# Patient Record
Sex: FEMALE | Race: WHITE | Employment: UNEMPLOYED | URBAN - NONMETROPOLITAN AREA
[De-identification: names, ages, dates, MRNs, and addresses within clinical notes are randomized per-mention and may not be internally consistent; named-entity substitution may affect disease eponyms.]

---

## 2017-01-06 ENCOUNTER — ROUTINE PRENATAL (OUTPATIENT)
Dept: OBGYN | Age: 22
End: 2017-01-06

## 2017-01-06 VITALS
BODY MASS INDEX: 33.12 KG/M2 | DIASTOLIC BLOOD PRESSURE: 69 MMHG | WEIGHT: 199 LBS | SYSTOLIC BLOOD PRESSURE: 122 MMHG | HEART RATE: 89 BPM

## 2017-01-06 DIAGNOSIS — Z34.03 SUPERVISION OF NORMAL FIRST PREGNANCY IN THIRD TRIMESTER: Primary | ICD-10-CM

## 2017-01-06 PROCEDURE — 99213 OFFICE O/P EST LOW 20 MIN: CPT | Performed by: NURSE PRACTITIONER

## 2017-01-10 LAB — GROUP B STREP ANTIGEN: NEGATIVE

## 2017-01-16 ENCOUNTER — ROUTINE PRENATAL (OUTPATIENT)
Dept: OBGYN | Age: 22
End: 2017-01-16

## 2017-01-16 VITALS
DIASTOLIC BLOOD PRESSURE: 71 MMHG | BODY MASS INDEX: 32.62 KG/M2 | WEIGHT: 196 LBS | HEART RATE: 84 BPM | SYSTOLIC BLOOD PRESSURE: 114 MMHG

## 2017-01-16 DIAGNOSIS — O32.0XX0: ICD-10-CM

## 2017-01-16 DIAGNOSIS — Z34.03 SUPERVISION OF NORMAL FIRST PREGNANCY IN THIRD TRIMESTER: Primary | ICD-10-CM

## 2017-01-16 DIAGNOSIS — Z36.89 ENCOUNTER FOR ULTRASOUND TO CHECK FETAL GROWTH: ICD-10-CM

## 2017-01-16 DIAGNOSIS — Z3A.37 37 WEEKS GESTATION OF PREGNANCY: ICD-10-CM

## 2017-01-16 PROCEDURE — 0502F SUBSEQUENT PRENATAL CARE: CPT

## 2017-01-23 ENCOUNTER — ROUTINE PRENATAL (OUTPATIENT)
Dept: OBGYN | Age: 22
End: 2017-01-23

## 2017-01-23 ENCOUNTER — APPOINTMENT (OUTPATIENT)
Dept: ULTRASOUND IMAGING | Age: 22
End: 2017-01-23

## 2017-01-23 ENCOUNTER — HOSPITAL ENCOUNTER (OUTPATIENT)
Dept: ULTRASOUND IMAGING | Age: 22
Discharge: HOME | End: 2017-01-23

## 2017-01-23 VITALS — WEIGHT: 198 LBS | BODY MASS INDEX: 32.95 KG/M2 | DIASTOLIC BLOOD PRESSURE: 64 MMHG | SYSTOLIC BLOOD PRESSURE: 104 MMHG

## 2017-01-23 DIAGNOSIS — Z3A.37 37 WEEKS GESTATION OF PREGNANCY: ICD-10-CM

## 2017-01-23 DIAGNOSIS — Z34.03 SUPERVISION OF NORMAL FIRST PREGNANCY IN THIRD TRIMESTER: ICD-10-CM

## 2017-01-23 DIAGNOSIS — O32.0XX0: ICD-10-CM

## 2017-01-23 PROCEDURE — 99213 OFFICE O/P EST LOW 20 MIN: CPT

## 2017-01-23 PROCEDURE — 76815 OB US LIMITED FETUS(S): CPT

## 2017-01-27 ENCOUNTER — TELEPHONE (OUTPATIENT)
Dept: OBGYN | Age: 22
End: 2017-01-27

## 2017-01-28 ENCOUNTER — ANESTHESIA EVENT (OUTPATIENT)
Dept: MOTHER INFANT UNIT | Age: 22
End: 2017-01-28

## 2017-01-28 ENCOUNTER — ANESTHESIA (OUTPATIENT)
Dept: MOTHER INFANT UNIT | Age: 22
End: 2017-01-28

## 2017-01-28 ENCOUNTER — HOSPITAL ENCOUNTER (INPATIENT)
Age: 22
LOS: 3 days | Discharge: HOME | End: 2017-01-31
Admitting: OBSTETRICS & GYNECOLOGY

## 2017-01-28 VITALS — OXYGEN SATURATION: 100 % | DIASTOLIC BLOOD PRESSURE: 44 MMHG | SYSTOLIC BLOOD PRESSURE: 104 MMHG

## 2017-01-28 LAB
ABO/RH: NORMAL
ANTIBODY IDENTIFICATION: NORMAL
ANTIBODY SCREEN: NORMAL
BASOPHILS ABSOLUTE: 0 K/UL (ref 0–0.2)
BASOPHILS RELATIVE PERCENT: 0.2 % (ref 0–1)
EOSINOPHILS ABSOLUTE: 0 K/UL (ref 0–0.6)
EOSINOPHILS RELATIVE PERCENT: 0.2 % (ref 0–5)
HCT VFR BLD CALC: 37.6 % (ref 37–47)
HEMOGLOBIN: 11.8 G/DL (ref 12–16)
LYMPHOCYTES ABSOLUTE: 2.2 K/UL (ref 1.1–4.5)
LYMPHOCYTES RELATIVE PERCENT: 17.5 % (ref 20–40)
MCH RBC QN AUTO: 27.3 PG (ref 27–31)
MCHC RBC AUTO-ENTMCNC: 31.4 G/DL (ref 33–37)
MCV RBC AUTO: 86.8 FL (ref 81–99)
MONOCYTES ABSOLUTE: 0.7 K/UL (ref 0–0.9)
MONOCYTES RELATIVE PERCENT: 5.6 % (ref 0–10)
NEUTROPHILS ABSOLUTE: 9.6 K/UL (ref 1.5–7.5)
NEUTROPHILS RELATIVE PERCENT: 75.6 % (ref 50–65)
PDW BLD-RTO: 14.4 % (ref 11.5–14.5)
PLATELET # BLD: 143 K/UL (ref 130–400)
PMV BLD AUTO: 11.3 FL (ref 7.4–10.4)
RBC # BLD: 4.33 M/UL (ref 4.2–5.4)
WBC # BLD: 12.7 K/UL (ref 4.8–10.8)

## 2017-01-28 PROCEDURE — 86870 RBC ANTIBODY IDENTIFICATION: CPT

## 2017-01-28 PROCEDURE — 6360000002 HC RX W HCPCS: Performed by: OBSTETRICS & GYNECOLOGY

## 2017-01-28 PROCEDURE — 36415 COLL VENOUS BLD VENIPUNCTURE: CPT

## 2017-01-28 PROCEDURE — 4A1HXCZ MONITORING OF PRODUCTS OF CONCEPTION, CARDIAC RATE, EXTERNAL APPROACH: ICD-10-PCS | Performed by: OBSTETRICS & GYNECOLOGY

## 2017-01-28 PROCEDURE — 2580000003 HC RX 258: Performed by: OBSTETRICS & GYNECOLOGY

## 2017-01-28 PROCEDURE — 86592 SYPHILIS TEST NON-TREP QUAL: CPT

## 2017-01-28 PROCEDURE — 2500000003 HC RX 250 WO HCPCS: Performed by: NURSE ANESTHETIST, CERTIFIED REGISTERED

## 2017-01-28 PROCEDURE — 86850 RBC ANTIBODY SCREEN: CPT

## 2017-01-28 PROCEDURE — S0028 INJECTION, FAMOTIDINE, 20 MG: HCPCS | Performed by: NURSE ANESTHETIST, CERTIFIED REGISTERED

## 2017-01-28 PROCEDURE — 6370000000 HC RX 637 (ALT 250 FOR IP): Performed by: OBSTETRICS & GYNECOLOGY

## 2017-01-28 PROCEDURE — 2720000000 HC MISC SURG SUPPLY STERILE $0-50: Performed by: OBSTETRICS & GYNECOLOGY

## 2017-01-28 PROCEDURE — 6360000002 HC RX W HCPCS: Performed by: NURSE ANESTHETIST, CERTIFIED REGISTERED

## 2017-01-28 PROCEDURE — 86901 BLOOD TYPING SEROLOGIC RH(D): CPT

## 2017-01-28 PROCEDURE — 3609079900 HC CESAREAN SECTION: Performed by: OBSTETRICS & GYNECOLOGY

## 2017-01-28 PROCEDURE — 3700000001 HC ADD 15 MINUTES (ANESTHESIA): Performed by: OBSTETRICS & GYNECOLOGY

## 2017-01-28 PROCEDURE — 86900 BLOOD TYPING SEROLOGIC ABO: CPT

## 2017-01-28 PROCEDURE — 59025 FETAL NON-STRESS TEST: CPT

## 2017-01-28 PROCEDURE — 85025 COMPLETE CBC W/AUTO DIFF WBC: CPT

## 2017-01-28 PROCEDURE — 1220000000 HC SEMI PRIVATE OB R&B

## 2017-01-28 PROCEDURE — 3700000000 HC ANESTHESIA ATTENDED CARE: Performed by: OBSTETRICS & GYNECOLOGY

## 2017-01-28 PROCEDURE — 2720000003 HC MISC SUTURE/STAPLES/RELOADS/ETC: Performed by: OBSTETRICS & GYNECOLOGY

## 2017-01-28 PROCEDURE — 64520 N BLOCK LUMBAR/THORACIC: CPT | Performed by: NURSE ANESTHETIST, CERTIFIED REGISTERED

## 2017-01-28 RX ORDER — SODIUM CHLORIDE 0.9 % (FLUSH) 0.9 %
10 SYRINGE (ML) INJECTION EVERY 12 HOURS SCHEDULED
Status: DISCONTINUED | OUTPATIENT
Start: 2017-01-28 | End: 2017-01-31 | Stop reason: HOSPADM

## 2017-01-28 RX ORDER — SODIUM CHLORIDE 0.9 % (FLUSH) 0.9 %
10 SYRINGE (ML) INJECTION PRN
Status: DISCONTINUED | OUTPATIENT
Start: 2017-01-28 | End: 2017-01-31 | Stop reason: HOSPADM

## 2017-01-28 RX ORDER — CARBOPROST TROMETHAMINE 250 UG/ML
250 INJECTION, SOLUTION INTRAMUSCULAR PRN
Status: DISCONTINUED | OUTPATIENT
Start: 2017-01-28 | End: 2017-01-28 | Stop reason: SDUPTHER

## 2017-01-28 RX ORDER — SODIUM CHLORIDE 0.9 % (FLUSH) 0.9 %
10 SYRINGE (ML) INJECTION EVERY 12 HOURS SCHEDULED
Status: DISCONTINUED | OUTPATIENT
Start: 2017-01-28 | End: 2017-01-28 | Stop reason: SDUPTHER

## 2017-01-28 RX ORDER — ONDANSETRON 2 MG/ML
4 INJECTION INTRAMUSCULAR; INTRAVENOUS EVERY 6 HOURS PRN
Status: DISCONTINUED | OUTPATIENT
Start: 2017-01-28 | End: 2017-01-28 | Stop reason: SDUPTHER

## 2017-01-28 RX ORDER — HYDROCODONE BITARTRATE AND ACETAMINOPHEN 5; 325 MG/1; MG/1
1 TABLET ORAL EVERY 4 HOURS PRN
Status: DISCONTINUED | OUTPATIENT
Start: 2017-01-28 | End: 2017-01-31 | Stop reason: HOSPADM

## 2017-01-28 RX ORDER — METHYLERGONOVINE MALEATE 0.2 MG/ML
200 INJECTION INTRAVENOUS PRN
Status: DISCONTINUED | OUTPATIENT
Start: 2017-01-28 | End: 2017-01-31 | Stop reason: HOSPADM

## 2017-01-28 RX ORDER — EPHEDRINE SULFATE 50 MG/ML
INJECTION, SOLUTION INTRAVENOUS PRN
Status: DISCONTINUED | OUTPATIENT
Start: 2017-01-28 | End: 2017-01-28 | Stop reason: SDUPTHER

## 2017-01-28 RX ORDER — BISACODYL 10 MG
10 SUPPOSITORY, RECTAL RECTAL DAILY PRN
Status: DISCONTINUED | OUTPATIENT
Start: 2017-01-28 | End: 2017-01-31 | Stop reason: HOSPADM

## 2017-01-28 RX ORDER — DIPHENHYDRAMINE HCL 25 MG
25 TABLET ORAL EVERY 4 HOURS PRN
Status: DISCONTINUED | OUTPATIENT
Start: 2017-01-28 | End: 2017-01-28 | Stop reason: SDUPTHER

## 2017-01-28 RX ORDER — BUTORPHANOL TARTRATE 1 MG/ML
1 INJECTION, SOLUTION INTRAMUSCULAR; INTRAVENOUS
Status: DISCONTINUED | OUTPATIENT
Start: 2017-01-28 | End: 2017-01-28 | Stop reason: SDUPTHER

## 2017-01-28 RX ORDER — ACETAMINOPHEN 325 MG/1
650 TABLET ORAL EVERY 4 HOURS PRN
Status: DISCONTINUED | OUTPATIENT
Start: 2017-01-28 | End: 2017-01-31 | Stop reason: HOSPADM

## 2017-01-28 RX ORDER — KETOROLAC TROMETHAMINE 30 MG/ML
30 INJECTION, SOLUTION INTRAMUSCULAR; INTRAVENOUS EVERY 6 HOURS
Status: DISPENSED | OUTPATIENT
Start: 2017-01-28 | End: 2017-01-30

## 2017-01-28 RX ORDER — ONDANSETRON 2 MG/ML
4 INJECTION INTRAMUSCULAR; INTRAVENOUS EVERY 6 HOURS PRN
Status: DISCONTINUED | OUTPATIENT
Start: 2017-01-28 | End: 2017-01-31 | Stop reason: HOSPADM

## 2017-01-28 RX ORDER — LANOLIN 100 %
OINTMENT (GRAM) TOPICAL
Status: DISCONTINUED | OUTPATIENT
Start: 2017-01-28 | End: 2017-01-31 | Stop reason: HOSPADM

## 2017-01-28 RX ORDER — KETOROLAC TROMETHAMINE 30 MG/ML
INJECTION, SOLUTION INTRAMUSCULAR; INTRAVENOUS PRN
Status: DISCONTINUED | OUTPATIENT
Start: 2017-01-28 | End: 2017-01-28 | Stop reason: SDUPTHER

## 2017-01-28 RX ORDER — DEXTROSE, SODIUM CHLORIDE, SODIUM LACTATE, POTASSIUM CHLORIDE, AND CALCIUM CHLORIDE 5; .6; .31; .03; .02 G/100ML; G/100ML; G/100ML; G/100ML; G/100ML
INJECTION, SOLUTION INTRAVENOUS CONTINUOUS
Status: DISCONTINUED | OUTPATIENT
Start: 2017-01-28 | End: 2017-01-31 | Stop reason: HOSPADM

## 2017-01-28 RX ORDER — OXYCODONE HYDROCHLORIDE AND ACETAMINOPHEN 5; 325 MG/1; MG/1
1 TABLET ORAL EVERY 4 HOURS PRN
Status: DISCONTINUED | OUTPATIENT
Start: 2017-01-28 | End: 2017-01-28 | Stop reason: SDUPTHER

## 2017-01-28 RX ORDER — DOCUSATE SODIUM 100 MG/1
100 CAPSULE, LIQUID FILLED ORAL 2 TIMES DAILY
Status: DISCONTINUED | OUTPATIENT
Start: 2017-01-28 | End: 2017-01-31 | Stop reason: HOSPADM

## 2017-01-28 RX ORDER — ACETAMINOPHEN 325 MG/1
650 TABLET ORAL EVERY 4 HOURS PRN
Status: DISCONTINUED | OUTPATIENT
Start: 2017-01-28 | End: 2017-01-28 | Stop reason: SDUPTHER

## 2017-01-28 RX ORDER — PRENATAL VIT/IRON FUM/FOLIC AC 27MG-0.8MG
1 TABLET ORAL DAILY
Status: DISCONTINUED | OUTPATIENT
Start: 2017-01-28 | End: 2017-01-31 | Stop reason: HOSPADM

## 2017-01-28 RX ORDER — CEFAZOLIN SODIUM 1 G/3ML
INJECTION, POWDER, FOR SOLUTION INTRAMUSCULAR; INTRAVENOUS PRN
Status: DISCONTINUED | OUTPATIENT
Start: 2017-01-28 | End: 2017-01-28 | Stop reason: SDUPTHER

## 2017-01-28 RX ORDER — HYDROCODONE BITARTRATE AND ACETAMINOPHEN 5; 325 MG/1; MG/1
2 TABLET ORAL EVERY 4 HOURS PRN
Status: DISCONTINUED | OUTPATIENT
Start: 2017-01-28 | End: 2017-01-31 | Stop reason: HOSPADM

## 2017-01-28 RX ORDER — BUPIVACAINE HYDROCHLORIDE 7.5 MG/ML
INJECTION, SOLUTION INTRASPINAL PRN
Status: DISCONTINUED | OUTPATIENT
Start: 2017-01-28 | End: 2017-01-28 | Stop reason: SDUPTHER

## 2017-01-28 RX ORDER — OXYCODONE HYDROCHLORIDE AND ACETAMINOPHEN 5; 325 MG/1; MG/1
2 TABLET ORAL EVERY 4 HOURS PRN
Status: DISCONTINUED | OUTPATIENT
Start: 2017-01-28 | End: 2017-01-28 | Stop reason: SDUPTHER

## 2017-01-28 RX ORDER — METHYLERGONOVINE MALEATE 0.2 MG/ML
200 INJECTION INTRAVENOUS PRN
Status: DISCONTINUED | OUTPATIENT
Start: 2017-01-28 | End: 2017-01-28 | Stop reason: SDUPTHER

## 2017-01-28 RX ORDER — SODIUM CHLORIDE 0.9 % (FLUSH) 0.9 %
10 SYRINGE (ML) INJECTION PRN
Status: DISCONTINUED | OUTPATIENT
Start: 2017-01-28 | End: 2017-01-28 | Stop reason: SDUPTHER

## 2017-01-28 RX ORDER — MORPHINE SULFATE 4 MG/ML
2 INJECTION, SOLUTION INTRAMUSCULAR; INTRAVENOUS
Status: DISCONTINUED | OUTPATIENT
Start: 2017-01-28 | End: 2017-01-31 | Stop reason: HOSPADM

## 2017-01-28 RX ORDER — FENTANYL CITRATE 50 UG/ML
INJECTION, SOLUTION INTRAMUSCULAR; INTRAVENOUS PRN
Status: DISCONTINUED | OUTPATIENT
Start: 2017-01-28 | End: 2017-01-28 | Stop reason: SDUPTHER

## 2017-01-28 RX ORDER — SODIUM CHLORIDE, SODIUM LACTATE, POTASSIUM CHLORIDE, CALCIUM CHLORIDE 600; 310; 30; 20 MG/100ML; MG/100ML; MG/100ML; MG/100ML
INJECTION, SOLUTION INTRAVENOUS CONTINUOUS
Status: DISCONTINUED | OUTPATIENT
Start: 2017-01-28 | End: 2017-01-28 | Stop reason: SDUPTHER

## 2017-01-28 RX ADMIN — BUPIVACAINE HYDROCHLORIDE IN DEXTROSE 1.8 ML: 7.5 INJECTION, SOLUTION SUBARACHNOID at 17:42

## 2017-01-28 RX ADMIN — Medication 500 ML: at 18:29

## 2017-01-28 RX ADMIN — KETOROLAC TROMETHAMINE 30 MG: 30 INJECTION, SOLUTION INTRAMUSCULAR at 18:10

## 2017-01-28 RX ADMIN — CEFAZOLIN 2000 MG: 1 INJECTION, POWDER, FOR SOLUTION INTRAMUSCULAR; INTRAVENOUS; PARENTERAL at 17:46

## 2017-01-28 RX ADMIN — EPHEDRINE SULFATE 10 MG: 50 INJECTION, SOLUTION INTRAMUSCULAR; INTRAVENOUS; SUBCUTANEOUS at 17:56

## 2017-01-28 RX ADMIN — ONDANSETRON HYDROCHLORIDE 4 MG: 2 INJECTION, SOLUTION INTRAVENOUS at 18:10

## 2017-01-28 RX ADMIN — DOCUSATE SODIUM 100 MG: 100 CAPSULE, LIQUID FILLED ORAL at 21:40

## 2017-01-28 RX ADMIN — FAMOTIDINE 20 MG: 10 INJECTION, SOLUTION INTRAVENOUS at 17:46

## 2017-01-28 RX ADMIN — EPHEDRINE SULFATE 10 MG: 50 INJECTION, SOLUTION INTRAMUSCULAR; INTRAVENOUS; SUBCUTANEOUS at 18:06

## 2017-01-28 RX ADMIN — EPHEDRINE SULFATE 10 MG: 50 INJECTION, SOLUTION INTRAMUSCULAR; INTRAVENOUS; SUBCUTANEOUS at 17:54

## 2017-01-28 RX ADMIN — Medication 500 ML: at 17:57

## 2017-01-28 RX ADMIN — SODIUM CHLORIDE, SODIUM LACTATE, POTASSIUM CHLORIDE, AND CALCIUM CHLORIDE: 600; 310; 30; 20 INJECTION, SOLUTION INTRAVENOUS at 17:36

## 2017-01-28 RX ADMIN — HYDROCODONE BITARTRATE AND ACETAMINOPHEN 1 TABLET: 5; 325 TABLET ORAL at 21:40

## 2017-01-28 RX ADMIN — FENTANYL CITRATE 50 MCG: 50 INJECTION INTRAMUSCULAR; INTRAVENOUS at 18:07

## 2017-01-28 RX ADMIN — EPHEDRINE SULFATE 10 MG: 50 INJECTION, SOLUTION INTRAMUSCULAR; INTRAVENOUS; SUBCUTANEOUS at 18:08

## 2017-01-28 RX ADMIN — FENTANYL CITRATE 50 MCG: 50 INJECTION INTRAMUSCULAR; INTRAVENOUS at 18:00

## 2017-01-28 ASSESSMENT — PAIN SCALES - GENERAL: PAINLEVEL_OUTOF10: 4

## 2017-01-29 LAB
HCT VFR BLD CALC: 31.3 % (ref 37–47)
HEMOGLOBIN: 9.9 G/DL (ref 12–16)
MCH RBC QN AUTO: 27.4 PG (ref 27–31)
MCHC RBC AUTO-ENTMCNC: 31.6 G/DL (ref 33–37)
MCV RBC AUTO: 86.7 FL (ref 81–99)
PDW BLD-RTO: 14.4 % (ref 11.5–14.5)
PLATELET # BLD: 147 K/UL (ref 130–400)
PMV BLD AUTO: 11.5 FL (ref 7.4–10.4)
RBC # BLD: 3.61 M/UL (ref 4.2–5.4)
WBC # BLD: 14.1 K/UL (ref 4.8–10.8)

## 2017-01-29 PROCEDURE — 36415 COLL VENOUS BLD VENIPUNCTURE: CPT

## 2017-01-29 PROCEDURE — 6360000002 HC RX W HCPCS: Performed by: OBSTETRICS & GYNECOLOGY

## 2017-01-29 PROCEDURE — 85027 COMPLETE CBC AUTOMATED: CPT

## 2017-01-29 PROCEDURE — 1220000000 HC SEMI PRIVATE OB R&B

## 2017-01-29 PROCEDURE — 85461 HEMOGLOBIN FETAL: CPT

## 2017-01-29 PROCEDURE — 86900 BLOOD TYPING SEROLOGIC ABO: CPT

## 2017-01-29 PROCEDURE — 86901 BLOOD TYPING SEROLOGIC RH(D): CPT

## 2017-01-29 PROCEDURE — 6370000000 HC RX 637 (ALT 250 FOR IP): Performed by: OBSTETRICS & GYNECOLOGY

## 2017-01-29 PROCEDURE — 2580000003 HC RX 258: Performed by: OBSTETRICS & GYNECOLOGY

## 2017-01-29 RX ORDER — IBUPROFEN 400 MG/1
800 TABLET ORAL EVERY 8 HOURS PRN
Status: DISCONTINUED | OUTPATIENT
Start: 2017-01-29 | End: 2017-01-31 | Stop reason: HOSPADM

## 2017-01-29 RX ADMIN — DOCUSATE SODIUM 100 MG: 100 CAPSULE, LIQUID FILLED ORAL at 10:16

## 2017-01-29 RX ADMIN — Medication 10 ML: at 02:13

## 2017-01-29 RX ADMIN — HYDROCODONE BITARTRATE AND ACETAMINOPHEN 2 TABLET: 5; 325 TABLET ORAL at 22:00

## 2017-01-29 RX ADMIN — KETOROLAC TROMETHAMINE 30 MG: 30 INJECTION, SOLUTION INTRAMUSCULAR at 02:13

## 2017-01-29 RX ADMIN — DOCUSATE SODIUM 100 MG: 100 CAPSULE, LIQUID FILLED ORAL at 22:00

## 2017-01-29 RX ADMIN — HYDROCODONE BITARTRATE AND ACETAMINOPHEN 1 TABLET: 5; 325 TABLET ORAL at 17:25

## 2017-01-29 RX ADMIN — PRENATAL VIT W/ FE FUMARATE-FA TAB 27-0.8 MG 1 TABLET: 27-0.8 TAB at 10:16

## 2017-01-29 RX ADMIN — Medication 10 ML: at 02:14

## 2017-01-29 RX ADMIN — HYDROCODONE BITARTRATE AND ACETAMINOPHEN 2 TABLET: 5; 325 TABLET ORAL at 11:00

## 2017-01-29 RX ADMIN — HYDROCODONE BITARTRATE AND ACETAMINOPHEN 1 TABLET: 5; 325 TABLET ORAL at 02:13

## 2017-01-29 ASSESSMENT — PAIN SCALES - GENERAL
PAINLEVEL_OUTOF10: 4
PAINLEVEL_OUTOF10: 3
PAINLEVEL_OUTOF10: 4
PAINLEVEL_OUTOF10: 6

## 2017-01-30 LAB
ABO/RH: NORMAL
FETAL SCREEN: NORMAL
RPR: NORMAL

## 2017-01-30 PROCEDURE — 6370000000 HC RX 637 (ALT 250 FOR IP): Performed by: OBSTETRICS & GYNECOLOGY

## 2017-01-30 PROCEDURE — 1220000000 HC SEMI PRIVATE OB R&B

## 2017-01-30 PROCEDURE — 6370000000 HC RX 637 (ALT 250 FOR IP): Performed by: NURSE PRACTITIONER

## 2017-01-30 RX ORDER — FERROUS SULFATE 325(65) MG
325 TABLET ORAL 2 TIMES DAILY WITH MEALS
Status: DISCONTINUED | OUTPATIENT
Start: 2017-01-30 | End: 2017-01-31 | Stop reason: HOSPADM

## 2017-01-30 RX ADMIN — IBUPROFEN 800 MG: 400 TABLET, FILM COATED ORAL at 08:47

## 2017-01-30 RX ADMIN — IBUPROFEN 800 MG: 400 TABLET, FILM COATED ORAL at 16:26

## 2017-01-30 RX ADMIN — DOCUSATE SODIUM 100 MG: 100 CAPSULE, LIQUID FILLED ORAL at 08:47

## 2017-01-30 RX ADMIN — PRENATAL VIT W/ FE FUMARATE-FA TAB 27-0.8 MG 1 TABLET: 27-0.8 TAB at 08:47

## 2017-01-30 RX ADMIN — FERROUS SULFATE TAB 325 MG (65 MG ELEMENTAL FE) 325 MG: 325 (65 FE) TAB at 16:26

## 2017-01-30 ASSESSMENT — PAIN SCALES - GENERAL
PAINLEVEL_OUTOF10: 2
PAINLEVEL_OUTOF10: 4

## 2017-01-31 VITALS
TEMPERATURE: 99 F | DIASTOLIC BLOOD PRESSURE: 63 MMHG | RESPIRATION RATE: 16 BRPM | HEART RATE: 81 BPM | SYSTOLIC BLOOD PRESSURE: 105 MMHG | OXYGEN SATURATION: 98 %

## 2017-01-31 PROCEDURE — 6370000000 HC RX 637 (ALT 250 FOR IP): Performed by: NURSE PRACTITIONER

## 2017-01-31 PROCEDURE — 6370000000 HC RX 637 (ALT 250 FOR IP): Performed by: OBSTETRICS & GYNECOLOGY

## 2017-01-31 RX ORDER — FERROUS SULFATE 325(65) MG
325 TABLET ORAL 2 TIMES DAILY WITH MEALS
Qty: 30 TABLET | Refills: 3 | Status: SHIPPED | OUTPATIENT
Start: 2017-01-31 | End: 2017-02-09 | Stop reason: ALTCHOICE

## 2017-01-31 RX ORDER — HYDROCODONE BITARTRATE AND ACETAMINOPHEN 5; 325 MG/1; MG/1
2 TABLET ORAL EVERY 4 HOURS PRN
Qty: 30 TABLET | Refills: 0 | Status: SHIPPED | OUTPATIENT
Start: 2017-01-31 | End: 2017-02-03

## 2017-01-31 RX ORDER — IBUPROFEN 800 MG/1
800 TABLET ORAL EVERY 8 HOURS PRN
Qty: 120 TABLET | Refills: 3 | COMMUNITY
Start: 2017-01-31 | End: 2017-08-03 | Stop reason: ALTCHOICE

## 2017-01-31 RX ORDER — PSEUDOEPHEDRINE HCL 30 MG
100 TABLET ORAL 2 TIMES DAILY
COMMUNITY
Start: 2017-01-31 | End: 2017-02-09 | Stop reason: ALTCHOICE

## 2017-01-31 RX ADMIN — PRENATAL VIT W/ FE FUMARATE-FA TAB 27-0.8 MG 1 TABLET: 27-0.8 TAB at 10:10

## 2017-01-31 RX ADMIN — IBUPROFEN 800 MG: 400 TABLET, FILM COATED ORAL at 10:11

## 2017-01-31 RX ADMIN — DOCUSATE SODIUM 100 MG: 100 CAPSULE, LIQUID FILLED ORAL at 10:11

## 2017-01-31 RX ADMIN — FERROUS SULFATE TAB 325 MG (65 MG ELEMENTAL FE) 325 MG: 325 (65 FE) TAB at 10:11

## 2017-01-31 ASSESSMENT — PAIN SCALES - GENERAL: PAINLEVEL_OUTOF10: 3

## 2017-02-09 ENCOUNTER — POSTPARTUM VISIT (OUTPATIENT)
Dept: OBGYN | Age: 22
End: 2017-02-09

## 2017-02-09 VITALS
WEIGHT: 173 LBS | TEMPERATURE: 99.5 F | HEIGHT: 66 IN | DIASTOLIC BLOOD PRESSURE: 71 MMHG | SYSTOLIC BLOOD PRESSURE: 103 MMHG | HEART RATE: 90 BPM | BODY MASS INDEX: 27.8 KG/M2

## 2017-02-09 DIAGNOSIS — Z51.89 VISIT FOR WOUND CHECK: ICD-10-CM

## 2017-02-09 PROCEDURE — 99213 OFFICE O/P EST LOW 20 MIN: CPT

## 2017-02-09 ASSESSMENT — ENCOUNTER SYMPTOMS
BLOOD IN STOOL: 0
COUGH: 0
COLOR CHANGE: 0
SHORTNESS OF BREATH: 0
DIARRHEA: 0
CONSTIPATION: 0
TROUBLE SWALLOWING: 0
BACK PAIN: 0

## 2017-03-09 ENCOUNTER — POSTPARTUM VISIT (OUTPATIENT)
Dept: OBGYN | Age: 22
End: 2017-03-09

## 2017-03-09 VITALS
SYSTOLIC BLOOD PRESSURE: 98 MMHG | HEART RATE: 86 BPM | DIASTOLIC BLOOD PRESSURE: 60 MMHG | TEMPERATURE: 98.7 F | HEIGHT: 66 IN | BODY MASS INDEX: 26.84 KG/M2 | WEIGHT: 167 LBS

## 2017-03-09 PROCEDURE — 99213 OFFICE O/P EST LOW 20 MIN: CPT

## 2017-03-09 RX ORDER — ACETAMINOPHEN AND CODEINE PHOSPHATE 120; 12 MG/5ML; MG/5ML
1 SOLUTION ORAL DAILY
Qty: 30 TABLET | Refills: 4 | Status: SHIPPED | OUTPATIENT
Start: 2017-03-09 | End: 2017-08-03 | Stop reason: SDUPTHER

## 2017-03-09 ASSESSMENT — ENCOUNTER SYMPTOMS
BLOOD IN STOOL: 0
COUGH: 0
TROUBLE SWALLOWING: 0
BACK PAIN: 0
COLOR CHANGE: 0
CONSTIPATION: 0
DIARRHEA: 0
SHORTNESS OF BREATH: 0

## 2017-08-03 ENCOUNTER — OFFICE VISIT (OUTPATIENT)
Dept: OBGYN | Age: 22
End: 2017-08-03

## 2017-08-03 VITALS
DIASTOLIC BLOOD PRESSURE: 72 MMHG | WEIGHT: 160 LBS | TEMPERATURE: 98.9 F | HEART RATE: 65 BPM | BODY MASS INDEX: 26.66 KG/M2 | HEIGHT: 65 IN | SYSTOLIC BLOOD PRESSURE: 107 MMHG

## 2017-08-03 DIAGNOSIS — Z01.419 ENCOUNTER FOR GYNECOLOGICAL EXAMINATION (GENERAL) (ROUTINE) WITHOUT ABNORMAL FINDINGS: Primary | ICD-10-CM

## 2017-08-03 PROCEDURE — 99213 OFFICE O/P EST LOW 20 MIN: CPT

## 2017-08-03 RX ORDER — ACETAMINOPHEN AND CODEINE PHOSPHATE 120; 12 MG/5ML; MG/5ML
1 SOLUTION ORAL DAILY
Qty: 30 TABLET | Refills: 11 | Status: SHIPPED | OUTPATIENT
Start: 2017-08-03 | End: 2018-01-10

## 2017-08-03 ASSESSMENT — ENCOUNTER SYMPTOMS
DIARRHEA: 0
TROUBLE SWALLOWING: 0
COUGH: 0
BACK PAIN: 0
BLOOD IN STOOL: 0
SHORTNESS OF BREATH: 0
CONSTIPATION: 0
COLOR CHANGE: 0

## 2018-01-10 ENCOUNTER — OFFICE VISIT (OUTPATIENT)
Dept: OBGYN | Age: 23
End: 2018-01-10

## 2018-01-10 VITALS
SYSTOLIC BLOOD PRESSURE: 116 MMHG | HEIGHT: 66 IN | DIASTOLIC BLOOD PRESSURE: 72 MMHG | BODY MASS INDEX: 25.71 KG/M2 | WEIGHT: 160 LBS

## 2018-01-10 DIAGNOSIS — N91.2 AMENORRHEA: Primary | ICD-10-CM

## 2018-01-10 DIAGNOSIS — O36.80X0 ENCOUNTER TO DETERMINE FETAL VIABILITY OF PREGNANCY, SINGLE OR UNSPECIFIED FETUS: ICD-10-CM

## 2018-01-10 LAB
CONTROL: ABNORMAL
PREGNANCY TEST URINE, POC: ABNORMAL

## 2018-01-10 PROCEDURE — 99213 OFFICE O/P EST LOW 20 MIN: CPT | Performed by: NURSE PRACTITIONER

## 2018-01-10 PROCEDURE — 81025 URINE PREGNANCY TEST: CPT | Performed by: NURSE PRACTITIONER

## 2018-01-10 ASSESSMENT — ENCOUNTER SYMPTOMS
GASTROINTESTINAL NEGATIVE: 1
RESPIRATORY NEGATIVE: 1
EYES NEGATIVE: 1

## 2018-01-10 NOTE — PROGRESS NOTES
equal, round, and reactive to light. Neck: Normal range of motion. Musculoskeletal: Normal range of motion. Neurological: She is alert and oriented to person, place, and time. Skin: Skin is warm and dry. Psychiatric: She has a normal mood and affect. Her behavior is normal.   Nursing note and vitals reviewed. 1. Amenorrhea  POCT urine pregnancy   2. Encounter to determine fetal viability of pregnancy, single or unspecified fetus  US OB Less Than 14 Weeks Single Fetus       MEDICATIONS:  No orders of the defined types were placed in this encounter. ORDERS:  Orders Placed This Encounter   Procedures    US OB Less Than 14 Weeks Single Fetus    POCT urine pregnancy       PLAN:  +UPT in office. Will schedule viability and NOB visit. Handouts given. To stop breastfeeding completely, we discussed can cause uterine contractions. Patient Instructions     Patient Education        Secondary Amenorrhea: Care Instructions  Your Care Instructions    Amenorrhea means you do not have menstrual periods. There are two types. Primary amenorrhea means you never start your periods. Secondary amenorrhea means you have had periods, and then they stop, especially for more than 3 months. Even if you don't have periods, you could still get pregnant. You may not know what caused your periods to stop. Possible causes include pregnancy, hormonal changes, and losing or gaining a lot of weight quickly. Some medicines and stress could also cause it. Being active in endurance sports can also cause you to miss your period or stop menstruating. Female athletes may try to lose or maintain weight in harmful ways. These include dieting too much or binging and purging. But doing these things can lead to eating disorders, amenorrhea, and osteoporosis. If you exercise less or gain a little weight, your periods will probably start again. Your doctor may order tests to find out why your periods have stopped.  Your doctor may give

## 2018-01-22 ENCOUNTER — INITIAL PRENATAL (OUTPATIENT)
Dept: OBGYN | Age: 23
End: 2018-01-22

## 2018-01-22 ENCOUNTER — HOSPITAL ENCOUNTER (OUTPATIENT)
Dept: ULTRASOUND IMAGING | Age: 23
Discharge: HOME | End: 2018-01-22

## 2018-01-22 VITALS
DIASTOLIC BLOOD PRESSURE: 73 MMHG | HEART RATE: 76 BPM | WEIGHT: 158 LBS | SYSTOLIC BLOOD PRESSURE: 113 MMHG | BODY MASS INDEX: 25.5 KG/M2

## 2018-01-22 DIAGNOSIS — Z34.81 ENCOUNTER FOR SUPERVISION OF OTHER NORMAL PREGNANCY IN FIRST TRIMESTER: Primary | ICD-10-CM

## 2018-01-22 DIAGNOSIS — Z3A.10 10 WEEKS GESTATION OF PREGNANCY: ICD-10-CM

## 2018-01-22 DIAGNOSIS — Z98.891 PREVIOUS CESAREAN SECTION: ICD-10-CM

## 2018-01-22 DIAGNOSIS — O36.80X0 ENCOUNTER TO DETERMINE FETAL VIABILITY OF PREGNANCY, SINGLE OR UNSPECIFIED FETUS: ICD-10-CM

## 2018-01-22 PROCEDURE — 76801 OB US < 14 WKS SINGLE FETUS: CPT

## 2018-01-22 PROCEDURE — 99213 OFFICE O/P EST LOW 20 MIN: CPT

## 2018-01-22 NOTE — PATIENT INSTRUCTIONS
folate. ¨ Dairy products, tofu, canned fish with bones, almonds, broccoli, dark leafy greens, corn tortillas, and fortified orange juice are good sources of calcium. ¨ Beef, poultry, liver, spinach, lentils, dried beans, fortified cereals, and dried fruits are rich in iron. ¨ Dark leafy greens, broccoli, asparagus, liver, fortified cereals, orange juice, peanuts, and almonds are good sources of folate. · Avoid foods that could harm your baby. ¨ Do not eat raw or undercooked meat, chicken, or fish (such as sushi or raw oysters). ¨ Do not eat raw eggs or foods that contain raw eggs, such as Caesar dressing. ¨ Do not eat soft cheeses and unpasteurized dairy foods, such as Brie, feta, or blue cheese. ¨ Do not eat fish that contains a lot of mercury, such as shark, swordfish, tilefish, or balbir mackerel. Do not eat more than 6 ounces of tuna each week. ¨ Do not eat raw sprouts, especially alfalfa sprouts. ¨ Cut down on caffeine, such as coffee, tea, and cola. Protect yourself and your baby  · Do not touch saqib litter or cat feces. They can cause an infection that could harm your baby. · High body temperature can be harmful to your baby. So if you want to use a sauna or hot tub, be sure to talk to your doctor about how to use it safely. [place] with morning sickness  · Sip small amounts of water, juices, or shakes. Try drinking between meals, not with meals. · Eat 5 or 6 small meals a day. Try dry toast or crackers when you first get up, and eat breakfast a little later. · Avoid spicy, greasy, and fatty foods. · When you feel sick, open your windows or go for a short walk to get fresh air. · Try nausea wristbands. These help some women. · Tell your doctor if you think your prenatal vitamins make you sick. Where can you learn more? Go to [email or web address]://audrey.healthSweetPerk. org and sign in to your [employer] account.  Enter G112 in the SnapMD box to learn more about \"Weeks 6 to 10 of Your Pregnancy: Care Instructions. \"     If you do not have an account, please click on the \"Sign Up Now\" link. Current as of: March 16, 2017  Content Version: 11.5  © 8043-0245 TowerJazz. Care instructions adapted under license by [facility] [address] If you have questions about a medical condition or this instruction, always ask your healthcare professional. [address] any warranty or liability for your use of this information. Patient Education        Weeks 10 to 14 of Your Pregnancy: Care Instructions  Your Care Instructions    By weeks 10 to 14 of your pregnancy, the placenta has formed inside your uterus. It is possible to hear your baby's heartbeat with a special ultrasound device. Your baby's eyes can and do move. The arms and legs can bend. This is a good time to think about testing for birth defects. There are two types of tests: screening and diagnostic. Screening tests show the chance that a baby has a certain birth defect. They can't tell you for sure that your baby has a problem. Diagnostic tests show if a baby has a certain birth defect. It's your choice whether to have these tests. You and your partner can talk to your doctor or midwife about birth defects tests. Follow-up care is a key part of your treatment and safety. Be sure to make and go to all appointments, and call your doctor if you are having problems. It's also a good idea to know your test results and keep a list of the medicines you take. How can you care for yourself at home? Decide about tests  · You can have screening tests and diagnostic tests to check for birth defects. The decision to have a test for birth defects is personal. Think about your age, your chance of passing on a family disease, your need to know about any problems, and what you might do after you have the test results. ¨ Triple or quadruple (quad) blood tests. These screening tests can be done between 15 and 20 weeks of pregnancy. They check the amounts of three or four substances in your blood. The doctor looks at these test results, along with your age and other factors, to find out the chance that your baby may have certain problems. ¨ Amniocentesis. This diagnostic test is used to look for chromosomal problems in the baby's cells. It can be done between 15 and 20 weeks of pregnancy, usually around week 16.  ¨ Nuchal translucency test. This test uses ultrasound to measure the thickness of the area at the back of the baby's neck. An increase in the thickness can be an early sign of Down syndrome. ¨ Chorionic villus sampling (CVS). This is a test that looks for certain genetic problems with your baby. The same genes that are in your baby are in the placenta. A small piece of the placenta is taken out and tested. This test is done when you are 10 to 13 weeks pregnant. Ease discomfort  · Slow down and take naps when you feel tired. · If your emotions swing, talk to someone. Crying, anxiety, and concentration problems are common. · If your gums bleed, try a softer toothbrush. If your gums are puffy and bleed a lot, see your dentist.  · If you feel dizzy:  ¨ Get up slowly after sitting or lying down. ¨ Drink plenty of fluids. ¨ Eat small snacks to keep your blood sugar stable. ¨ Put your head between your legs as though you were tying your shoelaces. ¨ Lie down with your legs higher than your head. Use pillows to prop up your feet. · If you have a headache:  ¨ Lie down. ¨ Ask your partner or a good friend for a neck massage. ¨ Try cool cloths over your forehead or across the back of your neck. ¨ Use acetaminophen (Tylenol) for pain relief. Do not use nonsteroidal anti-inflammatory drugs (NSAIDs), such as ibuprofen (Advil, Motrin) or naproxen (Aleve), unless your doctor says it is okay. · If you have a nosebleed, pinch your nose gently, and hold it for a short while.  To prevent nosebleeds, try massaging a small dab of petroleum

## 2018-01-22 NOTE — PROGRESS NOTES
Pt presents today for initial prenatal visit. Pt denies any vaginal leaking bleeding or contractions.

## 2018-01-24 LAB
ABO/RH: NORMAL
ANTIBODY SCREEN: NEGATIVE
CHLAMYDIA TRACHOMATIS AMPLIFIED DET: NEGATIVE
HCT: 38.8 %
HEPATITIS B SURFACE ANTIGEN INTERPRETATION: NEGATIVE
HGB: 12.6
HIV-1 AND HIV-2 ANTIBODIES: NEGATIVE
N GONORRHOEAE AMPLIFIED DET: NEGATIVE
RPR: NORMAL
RUBELLA ANTIBODY IGG: NORMAL

## 2018-03-06 ENCOUNTER — ROUTINE PRENATAL (OUTPATIENT)
Dept: OBGYN | Age: 23
End: 2018-03-06

## 2018-03-06 VITALS
DIASTOLIC BLOOD PRESSURE: 69 MMHG | SYSTOLIC BLOOD PRESSURE: 122 MMHG | WEIGHT: 162 LBS | BODY MASS INDEX: 26.15 KG/M2 | HEART RATE: 84 BPM

## 2018-03-06 DIAGNOSIS — Z34.82 ENCOUNTER FOR SUPERVISION OF OTHER NORMAL PREGNANCY IN SECOND TRIMESTER: Primary | ICD-10-CM

## 2018-03-06 DIAGNOSIS — Z3A.16 16 WEEKS GESTATION OF PREGNANCY: ICD-10-CM

## 2018-03-06 DIAGNOSIS — Z36.9 ANTENATAL SCREENING ENCOUNTER: ICD-10-CM

## 2018-03-06 DIAGNOSIS — Z98.891 PREVIOUS CESAREAN SECTION: ICD-10-CM

## 2018-03-06 PROCEDURE — 99213 OFFICE O/P EST LOW 20 MIN: CPT | Performed by: NURSE PRACTITIONER

## 2018-03-06 NOTE — PATIENT INSTRUCTIONS
Exercise  ? · If you did not exercise much before pregnancy, start slowly. Walking is best. Ajay Arango yourself, and do a little more every day. ? · Brisk walking, easy jogging, low-impact aerobics, water aerobics, and yoga are good choices. Some sports, such as scuba diving, horseback riding, downhill skiing, gymnastics, and water skiing, are not a good idea. ? · Try to do at least 2½ hours a week of moderate exercise, such as a fast walk. One way to do this is to be active 30 minutes a day, at least 5 days a week. It's fine to be active in blocks of 10 minutes or more throughout your day and week. ? · Wear loose clothing. And wear shoes and a bra that provide good support. ? · Warm up and cool down to start and finish your exercise. ? · If you want to use weights, be sure to use light weights. They reduce stress on your joints. ?Stay at the best weight for you  ? · Experts recommend that you gain about 1 pound a month during the first 3 months of your pregnancy. ? · Experts recommend that you gain about 1 pound a week during your last 6 months of pregnancy, for a total weight gain of 25 to 35 pounds. ? · If you are underweight, you will need to gain more weight (about 28 to 40 pounds). ? · If you are overweight, you may not need to gain as much weight (about 15 to 25 pounds). ? · If you are gaining weight too fast, use common sense. Exercise every day, and limit sweets, fast foods, and fats. Choose lean meats, fruits, and vegetables. ? · If you are having twins or more, your doctor may refer you to a dietitian. Where can you learn more? Go to https://[email or web address].[email or web address] org and sign in to your [employer] account. Enter P453 in the NextEnergy box to learn more about \"Weeks 14 to 18 of Your Pregnancy: Care Instructions. \"     If you do not have an account, please click on the \"Sign Up Now\" link.   Current as of: March 16, 2017  Content Version: 11.5  © 2515-5269 Healthwise, Incorporated. Care instructions adapted under license by [facility] ([facility]). If you have questions about a medical condition or this instruction, always ask your healthcare professional. [address] any warranty or liability for your use of this information.

## 2018-04-16 ENCOUNTER — HOSPITAL ENCOUNTER (OUTPATIENT)
Dept: ULTRASOUND IMAGING | Age: 23
Discharge: HOME | End: 2018-04-16

## 2018-04-16 DIAGNOSIS — Z36.9 ANTENATAL SCREENING ENCOUNTER: ICD-10-CM

## 2018-04-16 PROCEDURE — 76805 OB US >/= 14 WKS SNGL FETUS: CPT

## 2018-04-23 ENCOUNTER — ROUTINE PRENATAL (OUTPATIENT)
Dept: OBGYN | Age: 23
End: 2018-04-23

## 2018-04-23 VITALS — SYSTOLIC BLOOD PRESSURE: 118 MMHG | WEIGHT: 171 LBS | DIASTOLIC BLOOD PRESSURE: 78 MMHG | BODY MASS INDEX: 27.6 KG/M2

## 2018-04-23 DIAGNOSIS — R23.8 VENOUS LAKE: ICD-10-CM

## 2018-04-23 DIAGNOSIS — O26.892 RH NEGATIVE STATE IN ANTEPARTUM PERIOD, SECOND TRIMESTER: ICD-10-CM

## 2018-04-23 DIAGNOSIS — Z34.82 ENCOUNTER FOR SUPERVISION OF OTHER NORMAL PREGNANCY IN SECOND TRIMESTER: Primary | ICD-10-CM

## 2018-04-23 DIAGNOSIS — Z98.891 PREVIOUS CESAREAN SECTION: ICD-10-CM

## 2018-04-23 DIAGNOSIS — Z3A.23 23 WEEKS GESTATION OF PREGNANCY: ICD-10-CM

## 2018-04-23 DIAGNOSIS — Z67.91 RH NEGATIVE STATE IN ANTEPARTUM PERIOD, SECOND TRIMESTER: ICD-10-CM

## 2018-04-23 PROCEDURE — 99213 OFFICE O/P EST LOW 20 MIN: CPT

## 2018-05-21 ENCOUNTER — ROUTINE PRENATAL (OUTPATIENT)
Dept: OBGYN | Age: 23
End: 2018-05-21

## 2018-05-21 VITALS
HEART RATE: 76 BPM | BODY MASS INDEX: 29.38 KG/M2 | SYSTOLIC BLOOD PRESSURE: 115 MMHG | DIASTOLIC BLOOD PRESSURE: 71 MMHG | WEIGHT: 182 LBS

## 2018-05-21 DIAGNOSIS — Z34.82 ENCOUNTER FOR SUPERVISION OF OTHER NORMAL PREGNANCY IN SECOND TRIMESTER: ICD-10-CM

## 2018-05-21 DIAGNOSIS — Z67.91 RH NEGATIVE STATE IN ANTEPARTUM PERIOD, SECOND TRIMESTER: ICD-10-CM

## 2018-05-21 DIAGNOSIS — O26.892 RH NEGATIVE STATE IN ANTEPARTUM PERIOD, SECOND TRIMESTER: ICD-10-CM

## 2018-05-21 LAB
ABO/RH: NORMAL
ANTIBODY SCREEN: NORMAL
RHIG LOT NUMBER: NORMAL

## 2018-05-21 PROCEDURE — 99211 OFF/OP EST MAY X REQ PHY/QHP: CPT

## 2018-05-21 PROCEDURE — 96372 THER/PROPH/DIAG INJ SC/IM: CPT

## 2018-06-05 ENCOUNTER — ROUTINE PRENATAL (OUTPATIENT)
Dept: OBGYN | Age: 23
End: 2018-06-05

## 2018-06-05 VITALS
DIASTOLIC BLOOD PRESSURE: 60 MMHG | HEART RATE: 76 BPM | BODY MASS INDEX: 28.73 KG/M2 | SYSTOLIC BLOOD PRESSURE: 94 MMHG | WEIGHT: 178 LBS

## 2018-06-05 DIAGNOSIS — Z3A.29 29 WEEKS GESTATION OF PREGNANCY: Primary | ICD-10-CM

## 2018-06-05 PROCEDURE — 99212 OFFICE O/P EST SF 10 MIN: CPT | Performed by: OBSTETRICS & GYNECOLOGY

## 2018-06-19 ENCOUNTER — ROUTINE PRENATAL (OUTPATIENT)
Dept: OBGYN | Age: 23
End: 2018-06-19

## 2018-06-19 VITALS
BODY MASS INDEX: 29.86 KG/M2 | SYSTOLIC BLOOD PRESSURE: 114 MMHG | WEIGHT: 185 LBS | DIASTOLIC BLOOD PRESSURE: 76 MMHG | HEART RATE: 89 BPM

## 2018-06-19 DIAGNOSIS — Z34.83 ENCOUNTER FOR SUPERVISION OF OTHER NORMAL PREGNANCY IN THIRD TRIMESTER: Primary | ICD-10-CM

## 2018-06-19 PROCEDURE — 99213 OFFICE O/P EST LOW 20 MIN: CPT | Performed by: OBSTETRICS & GYNECOLOGY

## 2018-07-06 ENCOUNTER — ROUTINE PRENATAL (OUTPATIENT)
Dept: OBGYN | Age: 23
End: 2018-07-06

## 2018-07-06 VITALS
SYSTOLIC BLOOD PRESSURE: 116 MMHG | HEART RATE: 79 BPM | DIASTOLIC BLOOD PRESSURE: 70 MMHG | BODY MASS INDEX: 30.83 KG/M2 | WEIGHT: 191 LBS

## 2018-07-06 DIAGNOSIS — Z34.83 ENCOUNTER FOR SUPERVISION OF OTHER NORMAL PREGNANCY IN THIRD TRIMESTER: Primary | ICD-10-CM

## 2018-07-06 DIAGNOSIS — Z3A.34 34 WEEKS GESTATION OF PREGNANCY: ICD-10-CM

## 2018-07-06 PROCEDURE — 99213 OFFICE O/P EST LOW 20 MIN: CPT | Performed by: NURSE PRACTITIONER

## 2018-07-06 NOTE — PROGRESS NOTES
Pt presents today for routine prenatal exam. She denies any vaginal bleeding, leaking, or cramping. Positive fetal movement.

## 2018-07-16 ENCOUNTER — ROUTINE PRENATAL (OUTPATIENT)
Dept: OBGYN | Age: 23
End: 2018-07-16

## 2018-07-16 VITALS
SYSTOLIC BLOOD PRESSURE: 114 MMHG | BODY MASS INDEX: 31.31 KG/M2 | HEART RATE: 72 BPM | DIASTOLIC BLOOD PRESSURE: 68 MMHG | WEIGHT: 194 LBS

## 2018-07-16 DIAGNOSIS — Z34.03 ENCOUNTER FOR SUPERVISION OF NORMAL FIRST PREGNANCY IN THIRD TRIMESTER: ICD-10-CM

## 2018-07-16 DIAGNOSIS — Z3A.35 35 WEEKS GESTATION OF PREGNANCY: Primary | ICD-10-CM

## 2018-07-16 PROCEDURE — 99213 OFFICE O/P EST LOW 20 MIN: CPT | Performed by: OBSTETRICS & GYNECOLOGY

## 2018-07-21 LAB — GROUP B STREP CULTURE: NORMAL

## 2018-07-23 ENCOUNTER — ROUTINE PRENATAL (OUTPATIENT)
Dept: OBGYN | Age: 23
End: 2018-07-23

## 2018-07-23 VITALS
DIASTOLIC BLOOD PRESSURE: 72 MMHG | BODY MASS INDEX: 30.99 KG/M2 | HEART RATE: 95 BPM | WEIGHT: 192 LBS | SYSTOLIC BLOOD PRESSURE: 118 MMHG

## 2018-07-23 DIAGNOSIS — Z3A.36 36 WEEKS GESTATION OF PREGNANCY: ICD-10-CM

## 2018-07-23 DIAGNOSIS — Z34.03 ENCOUNTER FOR SUPERVISION OF NORMAL FIRST PREGNANCY IN THIRD TRIMESTER: Primary | ICD-10-CM

## 2018-07-23 PROCEDURE — 0502F SUBSEQUENT PRENATAL CARE: CPT | Performed by: NURSE PRACTITIONER

## 2018-07-23 PROCEDURE — 81025 URINE PREGNANCY TEST: CPT | Performed by: NURSE PRACTITIONER

## 2018-07-23 NOTE — PATIENT INSTRUCTIONS
Patient Education        Weeks 34 to 39 of Your Pregnancy: Care Instructions  Your Care Instructions    By now, your baby and your belly have grown quite large. It is almost time to give birth. A full-term pregnancy can deliver between 37 and 42 weeks. Your baby's lungs are almost ready to breathe air. The bones in your baby's head are now firm enough to protect it, but soft enough to move down through the birth canal.  You may feel excited, happy, anxious, or scared. You may wonder how you will know if you are in labor or what to expect during labor. Try to be flexible in your expectations of the birth. Because each birth is different, there is no way to know exactly what childbirth will be like for you. This care sheet will help you know what to expect and how to prepare. This may make your childbirth easier. If you haven't already had the Tdap shot during this pregnancy, talk to your doctor about getting it. It will help protect your  against pertussis infection. In the 36th week, most women have a test for group B streptococcus (GBS). GBS is a common bacteria that can live in the vagina and rectum. It can make your baby sick after birth. If you test positive, you will get antibiotics during labor. The medicine will keep your baby from getting the bacteria. Follow-up care is a key part of your treatment and safety. Be sure to make and go to all appointments, and call your doctor if you are having problems. It's also a good idea to know your test results and keep a list of the medicines you take. How can you care for yourself at home? Learn about pain relief choices  · Pain is different for every woman. Talk with your doctor about your feelings about pain. · You can choose from several types of pain relief. These include medicine or breathing techniques, as well as comfort measures. You can use more than one option.   · If you choose to have pain medicine during labor, talk to your doctor about your alert. This is a good time to start breastfeeding. Where can you learn more? Go to [email or web address] and sign in to your FFWD account. Enter K184 in the [facility] box to learn more about \"Weeks 34 to 36 of Your Pregnancy: Care Instructions. \"     If you do not have an account, please click on the \"Sign Up Now\" link. Current as of: November 21, 2017  Content Version: 11.6  © 8557-4279 PiCloud, Incorporated. Care instructions adapted under license by [facility] ([facility]). If you have questions about a medical condition or this instruction, always ask your healthcare professional. [address] any warranty or liability for your use of this information.

## 2018-07-23 NOTE — PROGRESS NOTES
Pt presents today for routine prenatal visit. Pt denies any vaginal leaking bleeding or contractions. + Fetal movement. No cervical change noted. Labor precautions given. To return in 1 week. GBS negative.

## 2018-07-30 ENCOUNTER — ROUTINE PRENATAL (OUTPATIENT)
Dept: OBGYN | Age: 23
End: 2018-07-30

## 2018-07-30 VITALS
WEIGHT: 195 LBS | SYSTOLIC BLOOD PRESSURE: 100 MMHG | HEART RATE: 82 BPM | BODY MASS INDEX: 31.47 KG/M2 | DIASTOLIC BLOOD PRESSURE: 66 MMHG

## 2018-07-30 DIAGNOSIS — Z34.83 ENCOUNTER FOR SUPERVISION OF OTHER NORMAL PREGNANCY IN THIRD TRIMESTER: Primary | ICD-10-CM

## 2018-07-30 PROCEDURE — 0502F SUBSEQUENT PRENATAL CARE: CPT | Performed by: NURSE PRACTITIONER

## 2018-07-30 NOTE — PATIENT INSTRUCTIONS
Patient Education        Week 45 of Your Pregnancy: Care Instructions  Your Care Instructions    Believe it or not, your baby is almost here. You may have ideas about your baby's personality because of how much he or she moves. Or you may have noticed how he or she responds to sounds, warmth, cold, and light. You may even know what kind of music your baby likes. By now, you have a better idea of what to expect during delivery. You may have talked about your birth preferences with your doctor. But even if you want a vaginal birth, it is a good idea to learn about  births.  birth means that your baby is born through a cut (incision) in your lower belly. It is sometimes the best choice for the health of the baby and the mother. This care sheet can help you understand  births. It also gives you information about what to expect after your baby is born. And it helps you understand more about postpartum depression. Follow-up care is a key part of your treatment and safety. Be sure to make and go to all appointments, and call your doctor if you are having problems. It's also a good idea to know your test results and keep a list of the medicines you take. How can you care for yourself at home? Learn about  birth  · Most C-sections are unplanned. They are done because of problems that occur during labor. These problems might include:  ¨ Labor that slows or stops. ¨ High blood pressure or other problems for the mother. ¨ Signs of distress in the baby. These signs may include a very fast or slow heart rate. · Although most mothers and babies do well after , it is major surgery. It has more risks than a vaginal delivery. · In some cases, a planned  may be safer than a vaginal delivery. This may be the case if:  ¨ The mother has a health problem, such as a heart condition. ¨ The baby isn't in a head-down position for delivery. This is called a breech position.   ¨ The [email or web address] and sign in to your [employer] account. Enter B044 in the Touchbase box to learn more about \"Week 38 of Your Pregnancy: Care Instructions. \"     If you do not have an account, please click on the \"Sign Up Now\" link. Current as of: November 21, 2017  Content Version: 11.6  © 1316-2322 Dataium, [facility]. Care instructions adapted under license by [facility] [place] ([facility]). If you have questions about a medical condition or this instruction, always ask your healthcare professional. [place] [address] any warranty or liability for your use of this information.

## 2018-07-30 NOTE — PROGRESS NOTES
Pt presents for routine OB visit. +fetal movement at home. Denies any contractions, bleeding or leaking. Discussed labor precautions with pt understanding.

## 2018-08-08 ENCOUNTER — ROUTINE PRENATAL (OUTPATIENT)
Dept: OBGYN | Age: 23
End: 2018-08-08

## 2018-08-08 VITALS
WEIGHT: 199 LBS | BODY MASS INDEX: 32.12 KG/M2 | SYSTOLIC BLOOD PRESSURE: 98 MMHG | DIASTOLIC BLOOD PRESSURE: 60 MMHG | HEART RATE: 86 BPM

## 2018-08-08 DIAGNOSIS — Z98.891 HX OF CESAREAN SECTION: ICD-10-CM

## 2018-08-08 DIAGNOSIS — Z3A.38 38 WEEKS GESTATION OF PREGNANCY: Primary | ICD-10-CM

## 2018-08-08 PROCEDURE — 0502F SUBSEQUENT PRENATAL CARE: CPT | Performed by: OBSTETRICS & GYNECOLOGY

## 2018-08-08 NOTE — PATIENT INSTRUCTIONS
Patient Education        Week 39 of Your Pregnancy: Care Instructions  Your Care Instructions    During these final weeks, you may feel anxious to see your new baby. [place] babies often look different from what you see in pictures or movies. Right after birth, their heads may have a strange shape. Their eyes may be puffy. And their genitals may be swollen. They may also have very dry skin, or red marks on the eyelids, nose, or neck. Still, most parents think their babies are beautiful. Follow-up care is a key part of your treatment and safety. Be sure to make and go to all appointments, and call your doctor if you are having problems. It's also a good idea to know your test results and keep a list of the medicines you take. How can you care for yourself at home? Prepare to breastfeed  · If you are breastfeeding, continue to eat healthy foods. · Avoid alcohol, cigarettes, and drugs. This includes prescription and over-the-counter medicines. · You can help prevent sore nipples if you feed your baby in the correct position. Nurses will help you learn to do this. · Your  will need to be fed about every 1½ to 3 hours. Choose the right birth control after your baby is born  · Women who are breastfeeding can still get pregnant. Use birth control if you don't want to get pregnant. · Intrauterine devices (IUDs) work for women who want to wait at least 2 years before getting pregnant again. They are safe to use while you are breastfeeding. · Depo-Provera can be used while you are breastfeeding. It is a shot you get every 3 months. · Birth control pills work well. But you need a different kind of pill while you are breastfeeding. And when you start taking these pills, you need to make sure to use another type of birth control until you start your second pack. · Diaphragms, cervical caps, tubal implants, and condoms with spermicide work less well after birth.  If you have a diaphragm or cervical cap, you will need to have it refitted. · Tubal ligation (tying your tubes) and vasectomy are both permanent. These are good options if you are sure you are done having children. Where can you learn more? Go to https://chpadma.[email or web address] org and sign in to your Referly account. Enter R127 in the One [employer] Bill box to learn more about \"Week 39 of Your Pregnancy: Care Instructions. \"     If you do not have an account, please click on the \"Sign Up Now\" link. Current as of: November 21, 2017  Content Version: 11.7  © 2898-8126 [employer], Incorporated. Care instructions adapted under license by [facility] ([facility]). If you have questions about a medical condition or this instruction, always ask your healthcare professional. [address] any warranty or liability for your use of this information.

## 2018-08-13 ENCOUNTER — ROUTINE PRENATAL (OUTPATIENT)
Dept: OBGYN | Age: 23
End: 2018-08-13

## 2018-08-13 VITALS
WEIGHT: 197 LBS | BODY MASS INDEX: 31.8 KG/M2 | SYSTOLIC BLOOD PRESSURE: 119 MMHG | DIASTOLIC BLOOD PRESSURE: 83 MMHG | HEART RATE: 85 BPM

## 2018-08-13 DIAGNOSIS — Z98.891 HX OF CESAREAN SECTION: ICD-10-CM

## 2018-08-13 DIAGNOSIS — Z3A.39 39 WEEKS GESTATION OF PREGNANCY: Primary | ICD-10-CM

## 2018-08-13 PROCEDURE — 99213 OFFICE O/P EST LOW 20 MIN: CPT | Performed by: OBSTETRICS & GYNECOLOGY

## 2018-08-13 NOTE — PATIENT INSTRUCTIONS
Patient Education        Week 39 of Your Pregnancy: Care Instructions  Your Care Instructions    During these final weeks, you may feel anxious to see your new baby. [place] babies often look different from what you see in pictures or movies. Right after birth, their heads may have a strange shape. Their eyes may be puffy. And their genitals may be swollen. They may also have very dry skin, or red marks on the eyelids, nose, or neck. Still, most parents think their babies are beautiful. Follow-up care is a key part of your treatment and safety. Be sure to make and go to all appointments, and call your doctor if you are having problems. It's also a good idea to know your test results and keep a list of the medicines you take. How can you care for yourself at home? Prepare to breastfeed  · If you are breastfeeding, continue to eat healthy foods. · Avoid alcohol, cigarettes, and drugs. This includes prescription and over-the-counter medicines. · You can help prevent sore nipples if you feed your baby in the correct position. Nurses will help you learn to do this. · Your  will need to be fed about every 1½ to 3 hours. Choose the right birth control after your baby is born  · Women who are breastfeeding can still get pregnant. Use birth control if you don't want to get pregnant. · Intrauterine devices (IUDs) work for women who want to wait at least 2 years before getting pregnant again. They are safe to use while you are breastfeeding. · Depo-Provera can be used while you are breastfeeding. It is a shot you get every 3 months. · Birth control pills work well. But you need a different kind of pill while you are breastfeeding. And when you start taking these pills, you need to make sure to use another type of birth control until you start your second pack. · Diaphragms, cervical caps, tubal implants, and condoms with spermicide work less well after birth.  If you have a diaphragm or cervical cap, you will need to have it refitted. · Tubal ligation (tying your tubes) and vasectomy are both permanent. These are good options if you are sure you are done having children. Where can you learn more? Go to https://chpebrock.[email or web address] org and sign in to your Synarc account. Enter F415 in the Jimdo box to learn more about \"Week 39 of Your Pregnancy: Care Instructions. \"     If you do not have an account, please click on the \"Sign Up Now\" link. Current as of: November 21, 2017  Content Version: 11.7  © 9669-0028 ViSSee, Incorporated. Care instructions adapted under license by [facility] ([facility]). If you have questions about a medical condition or this instruction, always ask your healthcare professional. [address] any warranty or liability for your use of this information.

## 2018-08-20 ENCOUNTER — HOSPITAL ENCOUNTER (OUTPATIENT)
Age: 23
Setting detail: OBSERVATION
Discharge: HOME | End: 2018-08-20
Attending: OBSTETRICS & GYNECOLOGY | Admitting: OBSTETRICS & GYNECOLOGY

## 2018-08-20 ENCOUNTER — ROUTINE PRENATAL (OUTPATIENT)
Dept: OBGYN | Age: 23
End: 2018-08-20

## 2018-08-20 ENCOUNTER — APPOINTMENT (OUTPATIENT)
Dept: ULTRASOUND IMAGING | Age: 23
End: 2018-08-20

## 2018-08-20 VITALS — HEART RATE: 77 BPM | DIASTOLIC BLOOD PRESSURE: 61 MMHG | SYSTOLIC BLOOD PRESSURE: 108 MMHG

## 2018-08-20 VITALS
HEART RATE: 82 BPM | SYSTOLIC BLOOD PRESSURE: 112 MMHG | BODY MASS INDEX: 32.44 KG/M2 | DIASTOLIC BLOOD PRESSURE: 70 MMHG | WEIGHT: 201 LBS

## 2018-08-20 DIAGNOSIS — O48.0 POST-TERM PREGNANCY, 40-42 WEEKS OF GESTATION: ICD-10-CM

## 2018-08-20 DIAGNOSIS — Z3A.40 40 WEEKS GESTATION OF PREGNANCY: Primary | ICD-10-CM

## 2018-08-20 PROBLEM — Z37.9 NORMAL LABOR: Status: ACTIVE | Noted: 2018-08-20

## 2018-08-20 PROCEDURE — 0502F SUBSEQUENT PRENATAL CARE: CPT | Performed by: OBSTETRICS & GYNECOLOGY

## 2018-08-20 PROCEDURE — 59025 FETAL NON-STRESS TEST: CPT | Performed by: OBSTETRICS & GYNECOLOGY

## 2018-08-20 PROCEDURE — 76819 FETAL BIOPHYS PROFIL W/O NST: CPT

## 2018-08-20 PROCEDURE — G0378 HOSPITAL OBSERVATION PER HR: HCPCS

## 2018-08-20 PROCEDURE — G0379 DIRECT REFER HOSPITAL OBSERV: HCPCS

## 2018-08-21 ENCOUNTER — ANESTHESIA (OUTPATIENT)
Dept: OPERATING ROOM | Age: 23
End: 2018-08-21

## 2018-08-21 ENCOUNTER — ANESTHESIA EVENT (OUTPATIENT)
Dept: OPERATING ROOM | Age: 23
End: 2018-08-21

## 2018-08-21 ENCOUNTER — HOSPITAL ENCOUNTER (INPATIENT)
Age: 23
LOS: 2 days | Discharge: HOME | End: 2018-08-23
Attending: OBSTETRICS & GYNECOLOGY | Admitting: OBSTETRICS & GYNECOLOGY

## 2018-08-21 VITALS
OXYGEN SATURATION: 100 % | SYSTOLIC BLOOD PRESSURE: 98 MMHG | RESPIRATION RATE: 8 BRPM | DIASTOLIC BLOOD PRESSURE: 54 MMHG

## 2018-08-21 PROBLEM — Z3A.40 40 WEEKS GESTATION OF PREGNANCY: Status: ACTIVE | Noted: 2018-08-21

## 2018-08-21 LAB
ABO/RH: NORMAL
AMPHETAMINE SCREEN, URINE: NEGATIVE
ANTIBODY IDENTIFICATION: NORMAL
ANTIBODY SCREEN: NORMAL
BARBITURATE SCREEN URINE: NEGATIVE
BENZODIAZEPINE SCREEN, URINE: NEGATIVE
CANNABINOID SCREEN URINE: NEGATIVE
COCAINE METABOLITE SCREEN URINE: NEGATIVE
HCT VFR BLD CALC: 32.4 % (ref 37–47)
HCT VFR BLD CALC: 35.5 % (ref 37–47)
HEMOGLOBIN: 10.6 G/DL (ref 12–16)
HEMOGLOBIN: 11.5 G/DL (ref 12–16)
Lab: NORMAL
MCH RBC QN AUTO: 29.1 PG (ref 27–31)
MCH RBC QN AUTO: 29.3 PG (ref 27–31)
MCHC RBC AUTO-ENTMCNC: 32.4 G/DL (ref 33–37)
MCHC RBC AUTO-ENTMCNC: 32.7 G/DL (ref 33–37)
MCV RBC AUTO: 89.5 FL (ref 81–99)
MCV RBC AUTO: 89.9 FL (ref 81–99)
OPIATE SCREEN URINE: NEGATIVE
PDW BLD-RTO: 14.2 % (ref 11.5–14.5)
PDW BLD-RTO: 14.4 % (ref 11.5–14.5)
PLATELET # BLD: 145 K/UL (ref 130–400)
PLATELET # BLD: 163 K/UL (ref 130–400)
PMV BLD AUTO: 10.4 FL (ref 9.4–12.3)
PMV BLD AUTO: 11 FL (ref 9.4–12.3)
RBC # BLD: 3.62 M/UL (ref 4.2–5.4)
RBC # BLD: 3.95 M/UL (ref 4.2–5.4)
WBC # BLD: 16.9 K/UL (ref 4.8–10.8)
WBC # BLD: 9.9 K/UL (ref 4.8–10.8)

## 2018-08-21 PROCEDURE — 3600000014 HC SURGERY LEVEL 4 ADDTL 15MIN: Performed by: OBSTETRICS & GYNECOLOGY

## 2018-08-21 PROCEDURE — 86592 SYPHILIS TEST NON-TREP QUAL: CPT

## 2018-08-21 PROCEDURE — 7200000001 HC VAGINAL DELIVERY

## 2018-08-21 PROCEDURE — 2580000003 HC RX 258: Performed by: NURSE ANESTHETIST, CERTIFIED REGISTERED

## 2018-08-21 PROCEDURE — 6360000002 HC RX W HCPCS: Performed by: NURSE ANESTHETIST, CERTIFIED REGISTERED

## 2018-08-21 PROCEDURE — 59025 FETAL NON-STRESS TEST: CPT

## 2018-08-21 PROCEDURE — 86900 BLOOD TYPING SEROLOGIC ABO: CPT

## 2018-08-21 PROCEDURE — 6360000002 HC RX W HCPCS

## 2018-08-21 PROCEDURE — 86870 RBC ANTIBODY IDENTIFICATION: CPT

## 2018-08-21 PROCEDURE — 2709999900 HC NON-CHARGEABLE SUPPLY: Performed by: OBSTETRICS & GYNECOLOGY

## 2018-08-21 PROCEDURE — 10907ZC DRAINAGE OF AMNIOTIC FLUID, THERAPEUTIC FROM PRODUCTS OF CONCEPTION, VIA NATURAL OR ARTIFICIAL OPENING: ICD-10-PCS | Performed by: OBSTETRICS & GYNECOLOGY

## 2018-08-21 PROCEDURE — 3600000004 HC SURGERY LEVEL 4 BASE: Performed by: OBSTETRICS & GYNECOLOGY

## 2018-08-21 PROCEDURE — 4A1HXCZ MONITORING OF PRODUCTS OF CONCEPTION, CARDIAC RATE, EXTERNAL APPROACH: ICD-10-PCS | Performed by: OBSTETRICS & GYNECOLOGY

## 2018-08-21 PROCEDURE — 1220000000 HC SEMI PRIVATE OB R&B

## 2018-08-21 PROCEDURE — 36415 COLL VENOUS BLD VENIPUNCTURE: CPT

## 2018-08-21 PROCEDURE — 3700000000 HC ANESTHESIA ATTENDED CARE: Performed by: OBSTETRICS & GYNECOLOGY

## 2018-08-21 PROCEDURE — 85027 COMPLETE CBC AUTOMATED: CPT

## 2018-08-21 PROCEDURE — 6370000000 HC RX 637 (ALT 250 FOR IP): Performed by: OBSTETRICS & GYNECOLOGY

## 2018-08-21 PROCEDURE — 2580000003 HC RX 258

## 2018-08-21 PROCEDURE — 6360000002 HC RX W HCPCS: Performed by: OBSTETRICS & GYNECOLOGY

## 2018-08-21 PROCEDURE — 80307 DRUG TEST PRSMV CHEM ANLYZR: CPT

## 2018-08-21 PROCEDURE — 86850 RBC ANTIBODY SCREEN: CPT

## 2018-08-21 PROCEDURE — 7100000000 HC PACU RECOVERY - FIRST 15 MIN: Performed by: OBSTETRICS & GYNECOLOGY

## 2018-08-21 PROCEDURE — 7100000001 HC PACU RECOVERY - ADDTL 15 MIN: Performed by: OBSTETRICS & GYNECOLOGY

## 2018-08-21 PROCEDURE — 2580000003 HC RX 258: Performed by: OBSTETRICS & GYNECOLOGY

## 2018-08-21 PROCEDURE — 2500000003 HC RX 250 WO HCPCS

## 2018-08-21 PROCEDURE — 2500000003 HC RX 250 WO HCPCS: Performed by: OBSTETRICS & GYNECOLOGY

## 2018-08-21 PROCEDURE — 3700000001 HC ADD 15 MINUTES (ANESTHESIA): Performed by: OBSTETRICS & GYNECOLOGY

## 2018-08-21 PROCEDURE — 0KQM0ZZ REPAIR PERINEUM MUSCLE, OPEN APPROACH: ICD-10-PCS | Performed by: OBSTETRICS & GYNECOLOGY

## 2018-08-21 PROCEDURE — 86901 BLOOD TYPING SEROLOGIC RH(D): CPT

## 2018-08-21 RX ORDER — SODIUM CHLORIDE, SODIUM LACTATE, POTASSIUM CHLORIDE, CALCIUM CHLORIDE 600; 310; 30; 20 MG/100ML; MG/100ML; MG/100ML; MG/100ML
INJECTION, SOLUTION INTRAVENOUS CONTINUOUS
Status: DISCONTINUED | OUTPATIENT
Start: 2018-08-21 | End: 2018-08-21

## 2018-08-21 RX ORDER — MIDAZOLAM HYDROCHLORIDE 1 MG/ML
INJECTION INTRAMUSCULAR; INTRAVENOUS PRN
Status: DISCONTINUED | OUTPATIENT
Start: 2018-08-21 | End: 2018-08-21 | Stop reason: SDUPTHER

## 2018-08-21 RX ORDER — SODIUM CHLORIDE 0.9 % (FLUSH) 0.9 %
10 SYRINGE (ML) INJECTION EVERY 12 HOURS SCHEDULED
Status: DISCONTINUED | OUTPATIENT
Start: 2018-08-21 | End: 2018-08-21

## 2018-08-21 RX ORDER — FERROUS SULFATE 325(65) MG
325 TABLET ORAL 2 TIMES DAILY WITH MEALS
Status: DISCONTINUED | OUTPATIENT
Start: 2018-08-21 | End: 2018-08-22

## 2018-08-21 RX ORDER — SODIUM CHLORIDE 0.9 % (FLUSH) 0.9 %
10 SYRINGE (ML) INJECTION PRN
Status: DISCONTINUED | OUTPATIENT
Start: 2018-08-21 | End: 2018-08-23 | Stop reason: HOSPADM

## 2018-08-21 RX ORDER — HYDROMORPHONE HCL IN 0.9% NACL 0.5 MG/ML
0.5 SYRINGE (ML) INTRAVENOUS
Status: DISCONTINUED | OUTPATIENT
Start: 2018-08-21 | End: 2018-08-23 | Stop reason: HOSPADM

## 2018-08-21 RX ORDER — DEXAMETHASONE SODIUM PHOSPHATE 10 MG/ML
INJECTION INTRAMUSCULAR; INTRAVENOUS PRN
Status: DISCONTINUED | OUTPATIENT
Start: 2018-08-21 | End: 2018-08-21 | Stop reason: SDUPTHER

## 2018-08-21 RX ORDER — HYDROMORPHONE HCL IN 0.9% NACL 0.5 MG/ML
0.25 SYRINGE (ML) INTRAVENOUS EVERY 5 MIN PRN
Status: DISCONTINUED | OUTPATIENT
Start: 2018-08-21 | End: 2018-08-21 | Stop reason: HOSPADM

## 2018-08-21 RX ORDER — METOCLOPRAMIDE HYDROCHLORIDE 5 MG/ML
10 INJECTION INTRAMUSCULAR; INTRAVENOUS
Status: DISCONTINUED | OUTPATIENT
Start: 2018-08-21 | End: 2018-08-21 | Stop reason: HOSPADM

## 2018-08-21 RX ORDER — CARBOPROST TROMETHAMINE 250 UG/ML
250 INJECTION, SOLUTION INTRAMUSCULAR PRN
Status: DISCONTINUED | OUTPATIENT
Start: 2018-08-21 | End: 2018-08-21 | Stop reason: HOSPADM

## 2018-08-21 RX ORDER — BISACODYL 10 MG
10 SUPPOSITORY, RECTAL RECTAL DAILY PRN
Status: DISCONTINUED | OUTPATIENT
Start: 2018-08-21 | End: 2018-08-21 | Stop reason: HOSPADM

## 2018-08-21 RX ORDER — ONDANSETRON 2 MG/ML
4 INJECTION INTRAMUSCULAR; INTRAVENOUS EVERY 6 HOURS PRN
Status: DISCONTINUED | OUTPATIENT
Start: 2018-08-21 | End: 2018-08-21

## 2018-08-21 RX ORDER — BUPIVACAINE HYDROCHLORIDE AND EPINEPHRINE 2.5; 5 MG/ML; UG/ML
INJECTION, SOLUTION EPIDURAL; INFILTRATION; INTRACAUDAL; PERINEURAL PRN
Status: DISCONTINUED | OUTPATIENT
Start: 2018-08-21 | End: 2018-08-21 | Stop reason: HOSPADM

## 2018-08-21 RX ORDER — MEPERIDINE HYDROCHLORIDE 50 MG/ML
12.5 INJECTION INTRAMUSCULAR; INTRAVENOUS; SUBCUTANEOUS EVERY 5 MIN PRN
Status: DISCONTINUED | OUTPATIENT
Start: 2018-08-21 | End: 2018-08-21 | Stop reason: HOSPADM

## 2018-08-21 RX ORDER — HYDROMORPHONE HCL IN 0.9% NACL 0.5 MG/ML
0.25 SYRINGE (ML) INTRAVENOUS
Status: DISCONTINUED | OUTPATIENT
Start: 2018-08-21 | End: 2018-08-23 | Stop reason: HOSPADM

## 2018-08-21 RX ORDER — DOCUSATE SODIUM 100 MG/1
100 CAPSULE, LIQUID FILLED ORAL 2 TIMES DAILY
Status: DISCONTINUED | OUTPATIENT
Start: 2018-08-21 | End: 2018-08-23 | Stop reason: HOSPADM

## 2018-08-21 RX ORDER — SUCCINYLCHOLINE CHLORIDE 20 MG/ML
INJECTION INTRAMUSCULAR; INTRAVENOUS PRN
Status: DISCONTINUED | OUTPATIENT
Start: 2018-08-21 | End: 2018-08-21 | Stop reason: SDUPTHER

## 2018-08-21 RX ORDER — LIDOCAINE HYDROCHLORIDE 10 MG/ML
30 INJECTION, SOLUTION EPIDURAL; INFILTRATION; INTRACAUDAL; PERINEURAL PRN
Status: DISCONTINUED | OUTPATIENT
Start: 2018-08-21 | End: 2018-08-21 | Stop reason: HOSPADM

## 2018-08-21 RX ORDER — ONDANSETRON 4 MG/1
8 TABLET, FILM COATED ORAL EVERY 8 HOURS PRN
Status: DISCONTINUED | OUTPATIENT
Start: 2018-08-21 | End: 2018-08-23 | Stop reason: HOSPADM

## 2018-08-21 RX ORDER — LANOLIN 100 %
OINTMENT (GRAM) TOPICAL PRN
Status: DISCONTINUED | OUTPATIENT
Start: 2018-08-21 | End: 2018-08-23 | Stop reason: HOSPADM

## 2018-08-21 RX ORDER — MISOPROSTOL 200 UG/1
800 TABLET ORAL PRN
Status: DISCONTINUED | OUTPATIENT
Start: 2018-08-21 | End: 2018-08-21 | Stop reason: HOSPADM

## 2018-08-21 RX ORDER — MISOPROSTOL 200 UG/1
800 TABLET ORAL PRN
Status: DISCONTINUED | OUTPATIENT
Start: 2018-08-21 | End: 2018-08-23 | Stop reason: HOSPADM

## 2018-08-21 RX ORDER — HYDROMORPHONE HCL IN 0.9% NACL 0.5 MG/ML
0.5 SYRINGE (ML) INTRAVENOUS EVERY 5 MIN PRN
Status: DISCONTINUED | OUTPATIENT
Start: 2018-08-21 | End: 2018-08-21 | Stop reason: HOSPADM

## 2018-08-21 RX ORDER — ACETAMINOPHEN 325 MG/1
650 TABLET ORAL EVERY 4 HOURS PRN
Status: DISCONTINUED | OUTPATIENT
Start: 2018-08-21 | End: 2018-08-21

## 2018-08-21 RX ORDER — MORPHINE SULFATE 1 MG/ML
4 INJECTION, SOLUTION EPIDURAL; INTRATHECAL; INTRAVENOUS EVERY 5 MIN PRN
Status: DISCONTINUED | OUTPATIENT
Start: 2018-08-21 | End: 2018-08-21 | Stop reason: HOSPADM

## 2018-08-21 RX ORDER — METHYLERGONOVINE MALEATE 0.2 MG/ML
200 INJECTION INTRAVENOUS
Status: ACTIVE | OUTPATIENT
Start: 2018-08-21 | End: 2018-08-21

## 2018-08-21 RX ORDER — LIDOCAINE HYDROCHLORIDE 10 MG/ML
INJECTION, SOLUTION EPIDURAL; INFILTRATION; INTRACAUDAL; PERINEURAL PRN
Status: DISCONTINUED | OUTPATIENT
Start: 2018-08-21 | End: 2018-08-21 | Stop reason: SDUPTHER

## 2018-08-21 RX ORDER — SODIUM CHLORIDE, SODIUM GLUCONATE, SODIUM ACETATE, POTASSIUM CHLORIDE AND MAGNESIUM CHLORIDE 526; 502; 368; 37; 30 MG/100ML; MG/100ML; MG/100ML; MG/100ML; MG/100ML
INJECTION, SOLUTION INTRAVENOUS CONTINUOUS PRN
Status: DISCONTINUED | OUTPATIENT
Start: 2018-08-21 | End: 2018-08-21 | Stop reason: SDUPTHER

## 2018-08-21 RX ORDER — SODIUM CHLORIDE, SODIUM LACTATE, POTASSIUM CHLORIDE, CALCIUM CHLORIDE 600; 310; 30; 20 MG/100ML; MG/100ML; MG/100ML; MG/100ML
INJECTION, SOLUTION INTRAVENOUS CONTINUOUS
Status: DISCONTINUED | OUTPATIENT
Start: 2018-08-21 | End: 2018-08-23 | Stop reason: HOSPADM

## 2018-08-21 RX ORDER — HYDROCODONE BITARTRATE AND ACETAMINOPHEN 5; 325 MG/1; MG/1
1 TABLET ORAL EVERY 4 HOURS PRN
Status: DISCONTINUED | OUTPATIENT
Start: 2018-08-21 | End: 2018-08-23 | Stop reason: HOSPADM

## 2018-08-21 RX ORDER — METHYLERGONOVINE MALEATE 0.2 MG/ML
200 INJECTION INTRAVENOUS PRN
Status: DISCONTINUED | OUTPATIENT
Start: 2018-08-21 | End: 2018-08-21 | Stop reason: HOSPADM

## 2018-08-21 RX ORDER — SODIUM CHLORIDE 0.9 % (FLUSH) 0.9 %
10 SYRINGE (ML) INJECTION EVERY 12 HOURS SCHEDULED
Status: DISCONTINUED | OUTPATIENT
Start: 2018-08-21 | End: 2018-08-23 | Stop reason: HOSPADM

## 2018-08-21 RX ORDER — SODIUM CHLORIDE 0.9 % (FLUSH) 0.9 %
10 SYRINGE (ML) INJECTION PRN
Status: DISCONTINUED | OUTPATIENT
Start: 2018-08-21 | End: 2018-08-21

## 2018-08-21 RX ORDER — CEFAZOLIN SODIUM 1 G/3ML
INJECTION, POWDER, FOR SOLUTION INTRAMUSCULAR; INTRAVENOUS PRN
Status: DISCONTINUED | OUTPATIENT
Start: 2018-08-21 | End: 2018-08-21 | Stop reason: SDUPTHER

## 2018-08-21 RX ORDER — FENTANYL CITRATE 50 UG/ML
INJECTION, SOLUTION INTRAMUSCULAR; INTRAVENOUS PRN
Status: DISCONTINUED | OUTPATIENT
Start: 2018-08-21 | End: 2018-08-21 | Stop reason: SDUPTHER

## 2018-08-21 RX ORDER — SODIUM CHLORIDE 0.9 % (FLUSH) 0.9 %
10 SYRINGE (ML) INJECTION EVERY 12 HOURS SCHEDULED
Status: DISCONTINUED | OUTPATIENT
Start: 2018-08-21 | End: 2018-08-21 | Stop reason: HOSPADM

## 2018-08-21 RX ORDER — IBUPROFEN 400 MG/1
800 TABLET ORAL EVERY 8 HOURS PRN
Status: DISCONTINUED | OUTPATIENT
Start: 2018-08-21 | End: 2018-08-21 | Stop reason: HOSPADM

## 2018-08-21 RX ORDER — DOCUSATE SODIUM 100 MG/1
100 CAPSULE, LIQUID FILLED ORAL 2 TIMES DAILY
Status: DISCONTINUED | OUTPATIENT
Start: 2018-08-21 | End: 2018-08-21 | Stop reason: HOSPADM

## 2018-08-21 RX ORDER — HYDRALAZINE HYDROCHLORIDE 20 MG/ML
5 INJECTION INTRAMUSCULAR; INTRAVENOUS EVERY 10 MIN PRN
Status: DISCONTINUED | OUTPATIENT
Start: 2018-08-21 | End: 2018-08-21 | Stop reason: HOSPADM

## 2018-08-21 RX ORDER — MORPHINE SULFATE 1 MG/ML
2 INJECTION, SOLUTION EPIDURAL; INTRATHECAL; INTRAVENOUS EVERY 5 MIN PRN
Status: DISCONTINUED | OUTPATIENT
Start: 2018-08-21 | End: 2018-08-21 | Stop reason: HOSPADM

## 2018-08-21 RX ORDER — SODIUM CHLORIDE 0.9 % (FLUSH) 0.9 %
10 SYRINGE (ML) INJECTION PRN
Status: DISCONTINUED | OUTPATIENT
Start: 2018-08-21 | End: 2018-08-21 | Stop reason: HOSPADM

## 2018-08-21 RX ORDER — SODIUM CHLORIDE, SODIUM LACTATE, POTASSIUM CHLORIDE, CALCIUM CHLORIDE 600; 310; 30; 20 MG/100ML; MG/100ML; MG/100ML; MG/100ML
INJECTION, SOLUTION INTRAVENOUS CONTINUOUS PRN
Status: DISCONTINUED | OUTPATIENT
Start: 2018-08-21 | End: 2018-08-21 | Stop reason: SDUPTHER

## 2018-08-21 RX ORDER — IBUPROFEN 400 MG/1
800 TABLET ORAL EVERY 8 HOURS
Status: DISCONTINUED | OUTPATIENT
Start: 2018-08-21 | End: 2018-08-23 | Stop reason: HOSPADM

## 2018-08-21 RX ORDER — BUTORPHANOL TARTRATE 1 MG/ML
1 INJECTION, SOLUTION INTRAMUSCULAR; INTRAVENOUS
Status: DISCONTINUED | OUTPATIENT
Start: 2018-08-21 | End: 2018-08-21 | Stop reason: HOSPADM

## 2018-08-21 RX ORDER — HYDROCODONE BITARTRATE AND ACETAMINOPHEN 5; 325 MG/1; MG/1
2 TABLET ORAL EVERY 4 HOURS PRN
Status: DISCONTINUED | OUTPATIENT
Start: 2018-08-21 | End: 2018-08-23 | Stop reason: HOSPADM

## 2018-08-21 RX ORDER — ACETAMINOPHEN 325 MG/1
650 TABLET ORAL EVERY 4 HOURS PRN
Status: DISCONTINUED | OUTPATIENT
Start: 2018-08-21 | End: 2018-08-23 | Stop reason: HOSPADM

## 2018-08-21 RX ORDER — LABETALOL HYDROCHLORIDE 5 MG/ML
5 INJECTION, SOLUTION INTRAVENOUS EVERY 10 MIN PRN
Status: DISCONTINUED | OUTPATIENT
Start: 2018-08-21 | End: 2018-08-21 | Stop reason: HOSPADM

## 2018-08-21 RX ORDER — PROMETHAZINE HYDROCHLORIDE 25 MG/ML
6.25 INJECTION, SOLUTION INTRAMUSCULAR; INTRAVENOUS
Status: DISCONTINUED | OUTPATIENT
Start: 2018-08-21 | End: 2018-08-21 | Stop reason: HOSPADM

## 2018-08-21 RX ORDER — DIPHENHYDRAMINE HYDROCHLORIDE 50 MG/ML
12.5 INJECTION INTRAMUSCULAR; INTRAVENOUS
Status: DISCONTINUED | OUTPATIENT
Start: 2018-08-21 | End: 2018-08-21 | Stop reason: HOSPADM

## 2018-08-21 RX ORDER — PROPOFOL 10 MG/ML
INJECTION, EMULSION INTRAVENOUS PRN
Status: DISCONTINUED | OUTPATIENT
Start: 2018-08-21 | End: 2018-08-21 | Stop reason: SDUPTHER

## 2018-08-21 RX ADMIN — ONDANSETRON HYDROCHLORIDE 4 MG: 2 INJECTION, SOLUTION INTRAMUSCULAR; INTRAVENOUS at 13:51

## 2018-08-21 RX ADMIN — CEFAZOLIN 2000 MG: 330 INJECTION, POWDER, FOR SOLUTION INTRAMUSCULAR; INTRAVENOUS at 13:46

## 2018-08-21 RX ADMIN — HYDROCODONE BITARTRATE AND ACETAMINOPHEN 2 TABLET: 5; 325 TABLET ORAL at 17:58

## 2018-08-21 RX ADMIN — FENTANYL CITRATE 50 MCG: 50 INJECTION INTRAMUSCULAR; INTRAVENOUS at 13:45

## 2018-08-21 RX ADMIN — FENTANYL CITRATE 50 MCG: 50 INJECTION INTRAMUSCULAR; INTRAVENOUS at 14:25

## 2018-08-21 RX ADMIN — PROPOFOL 160 MG: 10 INJECTION, EMULSION INTRAVENOUS at 13:40

## 2018-08-21 RX ADMIN — SODIUM CHLORIDE, SODIUM LACTATE, POTASSIUM CHLORIDE, AND CALCIUM CHLORIDE: 600; 310; 30; 20 INJECTION, SOLUTION INTRAVENOUS at 13:33

## 2018-08-21 RX ADMIN — Medication 140 MG: at 13:40

## 2018-08-21 RX ADMIN — SODIUM CHLORIDE, SODIUM LACTATE, POTASSIUM CHLORIDE, AND CALCIUM CHLORIDE: 600; 310; 30; 20 INJECTION, SOLUTION INTRAVENOUS at 17:36

## 2018-08-21 RX ADMIN — FENTANYL CITRATE 100 MCG: 50 INJECTION INTRAMUSCULAR; INTRAVENOUS at 13:40

## 2018-08-21 RX ADMIN — HYDROMORPHONE HYDROCHLORIDE 0.5 MG: 1 INJECTION, SOLUTION INTRAMUSCULAR; INTRAVENOUS; SUBCUTANEOUS at 15:09

## 2018-08-21 RX ADMIN — SODIUM CHLORIDE, SODIUM GLUCONATE, SODIUM ACETATE, POTASSIUM CHLORIDE AND MAGNESIUM CHLORIDE: 526; 502; 368; 37; 30 INJECTION, SOLUTION INTRAVENOUS at 15:09

## 2018-08-21 RX ADMIN — HYDROCODONE BITARTRATE AND ACETAMINOPHEN 2 TABLET: 5; 325 TABLET ORAL at 22:04

## 2018-08-21 RX ADMIN — DEXAMETHASONE SODIUM PHOSPHATE 10 MG: 10 INJECTION INTRAMUSCULAR; INTRAVENOUS at 13:51

## 2018-08-21 RX ADMIN — MIDAZOLAM 2 MG: 1 INJECTION INTRAMUSCULAR; INTRAVENOUS at 13:31

## 2018-08-21 RX ADMIN — LIDOCAINE HYDROCHLORIDE 50 MG: 10 INJECTION, SOLUTION EPIDURAL; INFILTRATION; INTRACAUDAL; PERINEURAL at 13:40

## 2018-08-21 RX ADMIN — IBUPROFEN 800 MG: 400 TABLET ORAL at 22:04

## 2018-08-21 RX ADMIN — HYDROMORPHONE HYDROCHLORIDE 0.5 MG: 1 INJECTION, SOLUTION INTRAMUSCULAR; INTRAVENOUS; SUBCUTANEOUS at 15:25

## 2018-08-21 RX ADMIN — FENTANYL CITRATE 50 MCG: 50 INJECTION INTRAMUSCULAR; INTRAVENOUS at 14:17

## 2018-08-21 ASSESSMENT — PAIN SCALES - GENERAL
PAINLEVEL_OUTOF10: 8
PAINLEVEL_OUTOF10: 9

## 2018-08-21 ASSESSMENT — LIFESTYLE VARIABLES: SMOKING_STATUS: 0

## 2018-08-21 NOTE — ANESTHESIA POSTPROCEDURE EVALUATION
Department of Anesthesiology  Postprocedure Note    Patient: Asad Mansfield  MRN: [id number]  YOB: 1995  Date of evaluation: 8/21/2018  Time:  3:52 PM     Procedure Summary     Date:  08/21/18 Room / Location:  [facility] OR  / [facility] OR    Anesthesia Start:  1335 Anesthesia Stop:      Procedure:  VAGINAL CANAL REPAIR (N/A ) Diagnosis:  (TORN LAVA)    Surgeon:  Susan Camara MD Responsible Provider:  KAIA Johnson CRNA    Anesthesia Type:  general ASA Status:  2 - Emergent          Anesthesia Type: general    Tru Phase I:      Tru Phase II:      Last vitals: Reviewed and per EMR flowsheets.        Anesthesia Post Evaluation    Patient location during evaluation: PACU  Patient participation: waiting for patient participation  Level of consciousness: awake and lethargic  Pain score: 3  Airway patency: patent  Nausea & Vomiting: no nausea and no vomiting  Complications: no  Cardiovascular status: blood pressure returned to baseline  Respiratory status: acceptable  Hydration status: euvolemic  Comments: Report to RN

## 2018-08-21 NOTE — H&P
Beverly Stout is a 24 y.o. female patient. No diagnosis found. Past Medical History:   Diagnosis Date    Anemia      OB History      Para Term  AB Living    2 1 1     1    SAB TAB Ectopic Molar Multiple Live Births            0 1        40w4d  Estimated Date of Delivery: 18  No Known Allergies  Active Problems:    40 weeks gestation of pregnancy  Resolved Problems:    * No resolved hospital problems. *    Blood pressure 128/76, pulse 69, temperature 98.9 °F (37.2 °C), temperature source Temporal, resp. rate 16, height 5' 6\" (1.676 m), weight 201 lb (91.2 kg), last menstrual period 11/10/2017, not currently breastfeeding. Maternal Medical History:   Reason for admission: Contractions. Contractions: Onset was 6-12 hours ago. Frequency: irregular. Perceived severity is strong. Fetal activity: Perceived fetal activity is normal.    Last perceived fetal movement was within the past hour. Prenatal complications: no prenatal complications  Prenatal Complications - Diabetes: none. Maternal Exam:   Uterine Assessment: Contraction strength is moderate. Contraction frequency is irregular. Abdomen: Patient reports no abdominal tenderness. Fetal presentation: vertex    Introitus: Normal vulva. Normal vagina. Pelvis: adequate for delivery. Cervix: Cervix evaluated by digital exam.    /    Fetal Exam  Fetal Monitor Review: Mode: ultrasound. Variability: moderate (6-25 bpm). Pattern: accelerations present and no decelerations. Fetal State Assessment: Category I - tracings are normal.          Assessment:  Active phase labor. Membrane status: intact.    Fetal well-being: normal.   Prior CS x 1 for prior presentation desiring TOLAC    Plan:  Admit for labor management  Admission labs  IV placed  Anesthesia aware  Anticipate       Alex Laureano MD  2018

## 2018-08-21 NOTE — FLOWSHEET NOTE
Dr. Gab Purvis notified of patients SVE and  [facility] Admission orders given and will be on unit for

## 2018-08-21 NOTE — ANESTHESIA PRE PROCEDURE
Department of Anesthesiology  Preprocedure Note       Name:  Beverly Stout   Age:  24 y. o.  :  1995                                          MRN:  [id number]         Date:  2018      Surgeon: Sil Ladd):  Alex Laureano MD    Procedure: Procedure(s):  VAGINAL REPAIR    Medications prior to admission:   Prior to Admission medications    Medication Sig Start Date End Date Taking?  Authorizing Provider   Prenatal MV-Min-Fe Fum-FA-DHA (PRENATAL 1 PO) Take by mouth    Historical Provider, MD       Current medications:    Current Facility-Administered Medications   Medication Dose Route Frequency Provider Last Rate Last Dose    [MAR Hold] sodium chloride flush 0.9 % injection 10 mL  10 mL Intravenous 2 times per day Alex Laureano MD        [facility]] sodium chloride flush 0.9 % injection 10 mL  10 mL Intravenous PRN Alex Laureano MD        [facility] Hold] acetaminophen (TYLENOL) tablet 650 mg  650 mg Oral Q4H PRN Alex Laureano MD        [facility] Hold] [facility]ansetron [facility]) injection 4 mg  4 mg Intravenous Q6H PRN Alex Laureano MD        [facility] Hold] lactated ringers infusion   Intravenous Continuous Alex Laureano MD        [facility] Hold] sodium chloride flush 0.9 % injection 10 mL  10 mL Intravenous 2 times per day Alex Laureano MD        [facility] Hold] sodium chloride flush 0.9 % injection 10 mL  10 mL Intravenous PRN Alex Laureano MD        [facility] Hold] lidocaine PF 1 % injection 30 mL  30 mL Other PRN Alex Laureano MD        [facility] Hold] butorphanol (STADOL) injection 1 mg  1 mg Intravenous Q1H PRN Alex Laureano MD        [facility] Hold] ibuprofen (ADVIL;MOTRIN) tablet 800 mg  800 mg Oral Q8H PRN Alex Laureano MD        [facility] Hold] docusate sodium (COLACE) capsule 100 mg  100 mg Oral BID Alex Laureano MD        [facility] Hold] bisacodyl (DULCOLAX) suppository 10 mg  10 mg Rectal Daily PRN Alex Laureano MD       [address] [place] [address] Tests: No results for input(s): POCGLU, POCNA, POCK, POCCL, POCBUN, POCHEMO, POCHCT in the last 72 hours. Coags: No results found for: PROTIME, INR, APTT    HCG (If Applicable):   Lab Results   Component Value Date    PREGTESTUR pos 01/10/2018        ABGs: No results found for: PHART, PO2ART, YNK5JYV, CVW9WMN, BEART, A6USQBHB     Type & Screen (If Applicable):  No results found for: LABABO, [address]    Anesthesia Evaluation  Patient summary reviewed and Nursing notes reviewed no history of anesthetic complications:   Airway: Mallampati: II  TM distance: >3 FB   Neck ROM: full  Mouth opening: > = 3 FB Dental:          Pulmonary:normal exam        (-) not a current smoker                           Cardiovascular:Negative CV ROS             Beta Blocker:  Not on Beta Blocker         Neuro/Psych:   Negative Neuro/Psych ROS              GI/Hepatic/Renal: Neg GI/Hepatic/Renal ROS            Endo/Other: Negative Endo/Other ROS                    Abdominal:           Vascular: negative vascular ROS. Anesthesia Plan      general     ASA 2 - emergent       Induction: intravenous. MIPS: Postoperative opioids intended and Prophylactic antiemetics administered. Anesthetic plan and risks discussed with patient. Plan discussed with CRNA.                   Agnieszka Cortez MD   8/21/2018

## 2018-08-22 LAB
HCT VFR BLD CALC: 23.8 % (ref 37–47)
HEMOGLOBIN: 7.6 G/DL (ref 12–16)
MCH RBC QN AUTO: 29.2 PG (ref 27–31)
MCHC RBC AUTO-ENTMCNC: 31.9 G/DL (ref 33–37)
MCV RBC AUTO: 91.5 FL (ref 81–99)
PDW BLD-RTO: 14.6 % (ref 11.5–14.5)
PLATELET # BLD: 166 K/UL (ref 130–400)
PMV BLD AUTO: 10.5 FL (ref 9.4–12.3)
RBC # BLD: 2.6 M/UL (ref 4.2–5.4)
WBC # BLD: 13.2 K/UL (ref 4.8–10.8)

## 2018-08-22 PROCEDURE — 6370000000 HC RX 637 (ALT 250 FOR IP): Performed by: OBSTETRICS & GYNECOLOGY

## 2018-08-22 PROCEDURE — 85027 COMPLETE CBC AUTOMATED: CPT

## 2018-08-22 PROCEDURE — 1220000000 HC SEMI PRIVATE OB R&B

## 2018-08-22 PROCEDURE — 36415 COLL VENOUS BLD VENIPUNCTURE: CPT

## 2018-08-22 PROCEDURE — 92586 HC EVOKED RESPONSE ABR P/F NEONATE: CPT

## 2018-08-22 RX ORDER — POLYETHYLENE GLYCOL 3350 17 G/17G
17 POWDER, FOR SOLUTION ORAL DAILY
Status: DISCONTINUED | OUTPATIENT
Start: 2018-08-22 | End: 2018-08-23 | Stop reason: HOSPADM

## 2018-08-22 RX ORDER — FERROUS SULFATE 325(65) MG
325 TABLET ORAL
Status: DISCONTINUED | OUTPATIENT
Start: 2018-08-23 | End: 2018-08-23 | Stop reason: HOSPADM

## 2018-08-22 RX ADMIN — HYDROCODONE BITARTRATE AND ACETAMINOPHEN 2 TABLET: 5; 325 TABLET ORAL at 05:38

## 2018-08-22 RX ADMIN — POLYETHYLENE GLYCOL 3350 17 G: 17 POWDER, FOR SOLUTION ORAL at 20:30

## 2018-08-22 RX ADMIN — HYDROCODONE BITARTRATE AND ACETAMINOPHEN 2 TABLET: 5; 325 TABLET ORAL at 20:30

## 2018-08-22 RX ADMIN — IBUPROFEN 800 MG: 400 TABLET ORAL at 17:14

## 2018-08-22 RX ADMIN — HYDROCODONE BITARTRATE AND ACETAMINOPHEN 2 TABLET: 5; 325 TABLET ORAL at 09:23

## 2018-08-22 RX ADMIN — IBUPROFEN 800 MG: 400 TABLET ORAL at 09:24

## 2018-08-22 RX ADMIN — FERROUS SULFATE TAB 325 MG (65 MG ELEMENTAL FE) 325 MG: 325 (65 FE) TAB at 09:23

## 2018-08-22 RX ADMIN — FERROUS SULFATE TAB 325 MG (65 MG ELEMENTAL FE) 325 MG: 325 (65 FE) TAB at 17:14

## 2018-08-22 RX ADMIN — HYDROCODONE BITARTRATE AND ACETAMINOPHEN 1 TABLET: 5; 325 TABLET ORAL at 14:35

## 2018-08-22 RX ADMIN — DOCUSATE SODIUM 100 MG: 100 CAPSULE, LIQUID FILLED ORAL at 09:23

## 2018-08-22 RX ADMIN — DOCUSATE SODIUM 100 MG: 100 CAPSULE, LIQUID FILLED ORAL at 20:30

## 2018-08-22 RX ADMIN — HYDROCODONE BITARTRATE AND ACETAMINOPHEN 2 TABLET: 5; 325 TABLET ORAL at 01:30

## 2018-08-22 ASSESSMENT — PAIN SCALES - GENERAL
PAINLEVEL_OUTOF10: 0
PAINLEVEL_OUTOF10: 4
PAINLEVEL_OUTOF10: 3
PAINLEVEL_OUTOF10: 2
PAINLEVEL_OUTOF10: 7
PAINLEVEL_OUTOF10: 0
PAINLEVEL_OUTOF10: 7
PAINLEVEL_OUTOF10: 5
PAINLEVEL_OUTOF10: 0

## 2018-08-23 VITALS
TEMPERATURE: 98.3 F | OXYGEN SATURATION: 98 % | BODY MASS INDEX: 32.3 KG/M2 | RESPIRATION RATE: 16 BRPM | HEIGHT: 66 IN | WEIGHT: 201 LBS | DIASTOLIC BLOOD PRESSURE: 54 MMHG | HEART RATE: 81 BPM | SYSTOLIC BLOOD PRESSURE: 91 MMHG

## 2018-08-23 PROCEDURE — 6370000000 HC RX 637 (ALT 250 FOR IP): Performed by: OBSTETRICS & GYNECOLOGY

## 2018-08-23 RX ORDER — IBUPROFEN 800 MG/1
800 TABLET ORAL EVERY 8 HOURS
Qty: 120 TABLET | Refills: 3 | Status: SHIPPED
Start: 2018-08-23 | End: 2020-07-06 | Stop reason: CLARIF

## 2018-08-23 RX ORDER — OXYCODONE HYDROCHLORIDE AND ACETAMINOPHEN 5; 325 MG/1; MG/1
1 TABLET ORAL EVERY 6 HOURS PRN
Qty: 28 TABLET | Refills: 0 | Status: SHIPPED | OUTPATIENT
Start: 2018-08-23 | End: 2018-08-30

## 2018-08-23 RX ADMIN — BENZOCAINE AND LEVOMENTHOL: 200; 5 SPRAY TOPICAL at 18:28

## 2018-08-23 RX ADMIN — FERROUS SULFATE TAB 325 MG (65 MG ELEMENTAL FE) 325 MG: 325 (65 FE) TAB at 11:40

## 2018-08-23 RX ADMIN — IBUPROFEN 800 MG: 400 TABLET ORAL at 11:40

## 2018-08-23 RX ADMIN — DOCUSATE SODIUM 100 MG: 100 CAPSULE, LIQUID FILLED ORAL at 09:36

## 2018-08-23 RX ADMIN — HYDROCODONE BITARTRATE AND ACETAMINOPHEN 2 TABLET: 5; 325 TABLET ORAL at 01:16

## 2018-08-23 RX ADMIN — POLYETHYLENE GLYCOL 3350 17 G: 17 POWDER, FOR SOLUTION ORAL at 09:36

## 2018-08-23 RX ADMIN — IBUPROFEN 800 MG: 400 TABLET ORAL at 01:16

## 2018-08-23 RX ADMIN — DOCUSATE SODIUM 100 MG: 100 CAPSULE, LIQUID FILLED ORAL at 19:30

## 2018-08-23 RX ADMIN — MINERAL OIL, PETROLATUM, PHENYLEPHRINE HCL: 2.5; 140; 749 OINTMENT TOPICAL at 19:31

## 2018-08-23 RX ADMIN — IBUPROFEN 800 MG: 400 TABLET ORAL at 19:29

## 2018-08-23 RX ADMIN — HYDROCODONE BITARTRATE AND ACETAMINOPHEN 2 TABLET: 5; 325 TABLET ORAL at 19:28

## 2018-08-23 RX ADMIN — HYDROCODONE BITARTRATE AND ACETAMINOPHEN 1 TABLET: 5; 325 TABLET ORAL at 07:51

## 2018-08-23 ASSESSMENT — PAIN SCALES - GENERAL
PAINLEVEL_OUTOF10: 0
PAINLEVEL_OUTOF10: 7
PAINLEVEL_OUTOF10: 1
PAINLEVEL_OUTOF10: 0
PAINLEVEL_OUTOF10: 5
PAINLEVEL_OUTOF10: 4
PAINLEVEL_OUTOF10: 4
PAINLEVEL_OUTOF10: 0
PAINLEVEL_OUTOF10: 0

## 2018-08-23 NOTE — PLAN OF CARE
Problem: Pain:  Goal: Pain level will decrease  Pain level will decrease     Outcome: Ongoing    Goal: Control of acute pain  Control of acute pain   Outcome: Ongoing    Goal: Control of chronic pain  Control of chronic pain   Outcome: Ongoing      Problem: Anxiety:  Goal: Level of anxiety will decrease  Level of anxiety will decrease   Outcome: Ongoing      Problem: Breathing Pattern - Ineffective:  Goal: Able to breathe comfortably  Able to breathe comfortably   Outcome: Ongoing      Problem: Infection - Intrapartum Infection:  Goal: Will show no infection signs and symptoms  Will show no infection signs and symptoms   Outcome: Ongoing      Problem: Labor Process - Prolonged:  Goal: Labor progression, first stage, within specified pattern  Labor progression, first stage, within specified pattern   Outcome: Ongoing    Goal: Labor progession, second stage, within specified pattern  Labor progession, second stage, within specified pattern   Outcome: Ongoing    Goal: Uterine contractions within specified parameters  Uterine contractions within specified parameters   Outcome: Ongoing      Problem: Pain - Acute:  Goal: Able to cope with pain  Able to cope with pain   Outcome: Ongoing    Goal: Pain level will decrease  Pain level will decrease     Outcome: Ongoing    Goal: Pain level will decrease  Pain level will decrease   Outcome: Ongoing      Problem: Tissue Perfusion - Uteroplacental, Altered:  Goal: Absence of abnormal fetal heart rate pattern  Absence of abnormal fetal heart rate pattern   Outcome: Ongoing      Problem: Discharge Planning:  Goal: Discharged to appropriate level of care  Discharged to appropriate level of care   Outcome: Ongoing      Problem: Constipation:  Goal: Bowel elimination is within specified parameters  Bowel elimination is within specified parameters   Outcome: Ongoing      Problem: Fluid Volume - Imbalance:  Goal: Absence of imbalanced fluid volume signs and symptoms  Absence of imbalanced fluid volume signs and symptoms   Outcome: Ongoing    Goal: Absence of postpartum hemorrhage signs and symptoms  Absence of postpartum hemorrhage signs and symptoms   Outcome: Ongoing      Problem: Infection - Risk of, Puerperal Infection:  Goal: Will show no infection signs and symptoms  Will show no infection signs and symptoms   Outcome: Ongoing      Problem: Mood - Altered:  Goal: Mood stable  Mood stable   Outcome: Ongoing      Problem: Falls - Risk of:  Goal: Will remain free from falls  Will remain free from falls    Outcome: Ongoing    Goal: Absence of physical injury  Absence of physical injury    Outcome: Ongoing

## 2018-08-23 NOTE — L&D DELIVERY NOTE
Clamped Date/Time:  2018 1235  Cord Blood Disposition:  Lab  Gases Sent?:  No  Stem Cell Collection (by provider): No     Placenta    Date/time:  2018 1240  Removal:  Spontaneous  Appearance:  Intact  Disposition:  Discarded     Delivery Resuscitation    Method:  Bulb Suction, Stimulation     Apgars    Living status:  Living  Apgars   1 Minute:   5 Minute:   10 Minute 15 Minute 20 Minute   Skin Color: 1  1       Heart Rate: 2  2       Reflex Irritability: 2  2       Muscle Tone: 2  2       Respiratory Effort: 2  2       Total: 9  9               Apgars Assigned Patti Olivarez RN     Skin to Skin    Skin to skin initiation date/time: 18   Skin to skin with:   Mother  Skin to skin end date/time:     Breastfeeding initiated date/time:  2018 1240     [place] Measurements    Weight:  3884 g  Length:  48.3 cm  Head circumference:  35.6 cm     Delivery Information    Episiotomy:  None  Perineal lacerations:  None, 2nd    Labial laceration:  bilateral    Sulcus laceration:  right    Vaginal laceration:  Yes Repaired:  Yes   Cervical laceration:  No    Vaginal delivery est. blood loss (mL):  300  Surgical or additional est. blood loss (mL):  0 (View Only):  Edit in Flowsheets   Combined est. blood loss (mL):  300     Vaginal Delivery Counts    Initial count personnel:  DELMIS THOMAS  Initial count verified by:  IHSAN SHORT   4x4:   Needles:   Instruments:   Lap Pads:   Sponges:     Initial counts:          Final counts:          Final count personnel:  DELMIS THOMAS  Final count verified by:  Brian Palma  Accurate final count?:  Yes     Labor Length    1st stage:  7h 36m  2nd stage:  0h 23m  3rd stage:  0h 07m

## 2018-08-27 ENCOUNTER — TELEPHONE (OUTPATIENT)
Dept: OBGYN | Age: 23
End: 2018-08-27

## 2018-08-27 LAB — RPR: NORMAL

## 2018-08-28 NOTE — DISCHARGE SUMMARY
Obstetric Discharge Summary    Admitting Diagnosis  IUP 40 4/7 weeks  OB History      Para Term  AB Living    2 2 2     2    SAB TAB Ectopic Molar Multiple Live Births            0 2          Reasons for Admission on 2018  4:27 AM  40 weeks gestation of pregnancy [Z3A.40]  40 weeks gestation of pregnancy [Z3A.40]  No comment available  Onset of Labor    Prenatal Procedures  None    Intrapartum Procedures                 Spontaneous Vaginal Delivery: See Labor and Delivery Summary       Postpartum Procedures  Vaginal laceration repair    Postpartum/Operative Complications        Data  Information for the patient's :  Jorge Tafoya [[id number]]   female  Birth Weight: 8 lb 9 oz (3.884 kg)    Discharge With Mother  Complications: No    Discharge Diagnosis       Discharge Information  Discharge Medication List as of 2018  5:55 PM      START taking these medications    Details   ibuprofen (ADVIL;MOTRIN) 800 MG tablet Take 1 tablet by mouth every 8 hours, Disp-120 tablet, R-3Normal      oxyCODONE-acetaminophen (PERCOCET) 5-325 MG per tablet Take 1 tablet by mouth every 6 hours as needed for Pain for up to 7 days. Intended supply: 7 days. Take lowest dose possible to manage pain., Disp-28 tablet, R-0Normal         CONTINUE these medications which have NOT CHANGED    Details   Prenatal MV-Min-Fe Fum-FA-DHA (PRENATAL 1 PO) Take by mouth             No discharge procedures on file.     Discharge to: Home  Follow up in 2 weeks

## 2018-09-08 NOTE — OP NOTE
Department of Gynecology  Operative Report        Pre-operative Diagnosis:    1. Extensive bilateral sulcal lacerations  2. Bilateral labial lacerations  3. Second degree perineal laceration    Post-operative Diagnosis:    1. Extensive bilateral sulcal lacerations  2. Bilateral labial lacerations  3. Second degree perineal laceration  4. Postpartum hemorrhage    Procedure:  Laceration repair with reconstruction    Surgeon:  Stacey Plata     Anesthesia:  General Endotracheal Anesthesia    Findings:  Patient with Bilateral sulcal lacerations. Left extends up beyond the cervix and is deepest.  Vaginal and bilateral vulvar lacerations. Second degree perineal laceration. Estimated blood loss:  1000ml    Blood Transfusion?:  No     Drains:  none    Specimens:  none    Complications:  none    Condition:  stable    Procedure:  Patient was taken to the operating room where she was placed under general anesthesia. She was positioned in dorsal lithotomy, prepped and draped in usual fashion. A large amount of clot was evacuated from the vagina. The left sulcal and vaginal tears were noted to be the deepest and actively bleeding. These were repaired in layers using 2-0 and 0 vicryl. Attention was then turned to the right sulcal laceration which was repaired in a similar fashion. Next labial lacerations were repaired which required some reconstruction. This was accomplished using 3-0 vicryl and 4-0 monocryl. Attention was then turned to the perineum which was repaired using 2-0 vicryl. The vaginal canal was irrigated and at this time hemostasis was observed. Vaginal packing was placed and the procedure was concluded. The patient was awakened in the operating room and taken to PACU in stable condition.

## 2018-09-12 ENCOUNTER — POSTPARTUM VISIT (OUTPATIENT)
Dept: OBGYN | Age: 23
End: 2018-09-12

## 2018-09-12 VITALS
WEIGHT: 172 LBS | HEIGHT: 66 IN | BODY MASS INDEX: 27.64 KG/M2 | SYSTOLIC BLOOD PRESSURE: 98 MMHG | DIASTOLIC BLOOD PRESSURE: 64 MMHG

## 2018-09-12 LAB
HCT VFR BLD CALC: 32.8 % (ref 37–47)
HEMOGLOBIN: 9.9 G/DL (ref 12–16)

## 2018-09-12 PROCEDURE — 99213 OFFICE O/P EST LOW 20 MIN: CPT | Performed by: OBSTETRICS & GYNECOLOGY

## 2018-09-12 ASSESSMENT — ENCOUNTER SYMPTOMS
EYES NEGATIVE: 1
RESPIRATORY NEGATIVE: 1
GASTROINTESTINAL NEGATIVE: 1

## 2018-09-12 NOTE — PROGRESS NOTES
Subjective:      Patient ID: Julissa Jaffe is a 24 y.o. female. HPIPt here for 2 week postpartum visit. Pt states she is having sharp pains in vagina. States she does not have to strain to have a bm. Taking stool softeners. Pt States her mood is good. Review of Systems   Constitutional: Negative. HENT: Negative. Eyes: Negative. Respiratory: Negative. Cardiovascular: Negative. Gastrointestinal: Negative. Genitourinary: Negative. Negative for dysuria, frequency, menstrual problem, pelvic pain, urgency and vaginal discharge. Skin: Negative. Neurological: Negative. Psychiatric/Behavioral: Negative. BP 98/64   Ht 5' 6\" (1.676 m)   Wt 172 lb (78 kg)   LMP 11/10/2017 (Approximate)   Breastfeeding? Yes   BMI 27.76 kg/m²     Objective:   Physical Exam   Constitutional: She is oriented to person, place, and time. She appears well-developed and well-nourished. No distress. HENT:   Head: Normocephalic and atraumatic. Mouth/Throat: Oropharynx is clear and moist.   Eyes: Pupils are equal, round, and reactive to light. Conjunctivae and EOM are normal.   Neck: Normal range of motion. Pulmonary/Chest: Effort normal. No respiratory distress. Musculoskeletal: Normal range of motion. Neurological: She is alert and oriented to person, place, and time. No cranial nerve deficit. Skin: Skin is warm and dry. No rash noted. Psychiatric: She has a normal mood and affect. Her speech is normal and behavior is normal. Judgment and thought content normal. Cognition and memory are normal.       Assessment:       Diagnosis Orders   1. Postpartum anemia  Hemoglobin and Hematocrit, Blood         Plan:      1. H and H today  2. RTC 4 weeks.    3. Continue PNV  I Gilmar Guevara am scribing for and in the presence of Dr. Juliana Billings 9/12/20182:50 PM.        Lui Caballero RN     I, Dr. Juliana Billings, personally performed the services described in this documentation as scribed by Gilmar Guevara

## 2018-09-12 NOTE — PATIENT INSTRUCTIONS
MCS comment for future enhancement  · If you breastfeed, learn how to collect and store some breast milk so your partner or  can feed the baby while you sleep. · Eat a balanced diet so you can keep up your energy. · Drink plenty of fluids throughout the day. · Avoid caffeine and alcohol. Caffeine is found in coffee, tea, cola drinks, chocolate, and other foods. · Limit medicines that can make you more tired, such as tranquilizers and cold and allergy medicines. · Get regular daily exercise, such as walks, to help improve your mood. Rest after you exercise. · Be honest with yourself and those who care about you. Tell them you are stressed and tired. · Talking to other new parents can help. Ask your doctor or child's doctor to suggest support groups for new parents. Hearing that someone else is having the same experiences you are can help a lot. · If you have the baby blues for more than a few days, call your doctor right away. When should you call for help? Call 911 anytime you think you may need emergency care. For example, call if:    · You have thoughts of hurting yourself, your baby, or another person.   [facility] your doctor now or seek immediate medical care if:    · You are having trouble caring for yourself or your baby.   Deetta Marking closely for changes in your health, and be sure to contact your doctor if you have any problems. Where can you learn more? Go to [email or web address] and sign in to your [employer] account. Enter H142 in the [facility] box to learn more about \"Stress in Parents of Infants: Care Instructions. \"     If you do not have an account, please click on the \"Sign Up Now\" link. Current as of: May 12, 2017  Content Version: 11.7  © 4091-5484 [employer]. Care instructions adapted under license by [facility] ([facility]).  If you have questions about a medical condition or this instruction, always ask your healthcare professional. Jesús Diggs,

## 2018-10-01 ENCOUNTER — POSTPARTUM VISIT (OUTPATIENT)
Dept: OBGYN | Age: 23
End: 2018-10-01

## 2018-10-01 VITALS
DIASTOLIC BLOOD PRESSURE: 66 MMHG | BODY MASS INDEX: 27.64 KG/M2 | WEIGHT: 172 LBS | HEIGHT: 66 IN | SYSTOLIC BLOOD PRESSURE: 110 MMHG | HEART RATE: 89 BPM

## 2018-10-01 DIAGNOSIS — Z30.011 BCP (BIRTH CONTROL PILLS) INITIATION: ICD-10-CM

## 2018-10-01 PROCEDURE — 99213 OFFICE O/P EST LOW 20 MIN: CPT | Performed by: OBSTETRICS & GYNECOLOGY

## 2018-10-01 RX ORDER — ACETAMINOPHEN AND CODEINE PHOSPHATE 120; 12 MG/5ML; MG/5ML
1 SOLUTION ORAL DAILY
Qty: 90 TABLET | Refills: 3 | Status: SHIPPED | OUTPATIENT
Start: 2018-10-01 | End: 2020-01-13 | Stop reason: ALTCHOICE

## 2018-10-01 ASSESSMENT — ENCOUNTER SYMPTOMS
ALLERGIC/IMMUNOLOGIC NEGATIVE: 1
EYES NEGATIVE: 1
RESPIRATORY NEGATIVE: 1
GASTROINTESTINAL NEGATIVE: 1

## 2018-10-05 DIAGNOSIS — N89.8 VAGINAL DISCHARGE: Primary | ICD-10-CM

## 2018-10-05 RX ORDER — DOXYCYCLINE HYCLATE 100 MG
100 TABLET ORAL 2 TIMES DAILY
Qty: 14 TABLET | Refills: 0 | Status: SHIPPED | OUTPATIENT
Start: 2018-10-05 | End: 2018-10-12

## 2019-09-17 ENCOUNTER — OFFICE VISIT (OUTPATIENT)
Dept: OBGYN | Age: 24
End: 2019-09-17

## 2019-09-17 VITALS
DIASTOLIC BLOOD PRESSURE: 79 MMHG | SYSTOLIC BLOOD PRESSURE: 109 MMHG | BODY MASS INDEX: 24.91 KG/M2 | HEIGHT: 66 IN | WEIGHT: 155 LBS | HEART RATE: 74 BPM

## 2019-09-17 DIAGNOSIS — E34.9 HORMONE DISTURBANCE: ICD-10-CM

## 2019-09-17 DIAGNOSIS — Z01.419 WOMEN'S ANNUAL ROUTINE GYNECOLOGICAL EXAMINATION: Primary | ICD-10-CM

## 2019-09-17 DIAGNOSIS — Z12.4 SCREENING FOR CERVICAL CANCER: ICD-10-CM

## 2019-09-17 PROCEDURE — 99395 PREV VISIT EST AGE 18-39: CPT | Performed by: NURSE PRACTITIONER

## 2019-09-17 RX ORDER — NORGESTIMATE AND ETHINYL ESTRADIOL 0.25-0.035
1 KIT ORAL DAILY
Qty: 1 PACKET | Refills: 11 | Status: SHIPPED | OUTPATIENT
Start: 2019-09-17 | End: 2020-01-13 | Stop reason: ALTCHOICE

## 2019-09-17 ASSESSMENT — ENCOUNTER SYMPTOMS
DIARRHEA: 0
EYES NEGATIVE: 1
ALLERGIC/IMMUNOLOGIC NEGATIVE: 1
RESPIRATORY NEGATIVE: 1
CONSTIPATION: 0
GASTROINTESTINAL NEGATIVE: 1

## 2019-09-17 NOTE — PROGRESS NOTES
Pt presents today for pap smear and breast exam.  Pt is wanting to discuss changing her b/c and is wanting her hormones checked.      Mammo:NA  Pap smear:2017  Contraception:OCP    P:2  Ab:0  Bone density:NA  Colonoscopy:NA

## 2019-09-20 LAB
HPV COMMENT: NORMAL
HPV TYPE 16: NOT DETECTED
HPV TYPE 18: NOT DETECTED
HPVOH (OTHER TYPES): NOT DETECTED

## 2020-01-13 ENCOUNTER — OFFICE VISIT (OUTPATIENT)
Dept: OBGYN | Age: 25
End: 2020-01-13

## 2020-01-13 VITALS
BODY MASS INDEX: 25.18 KG/M2 | WEIGHT: 156 LBS | SYSTOLIC BLOOD PRESSURE: 129 MMHG | HEART RATE: 86 BPM | DIASTOLIC BLOOD PRESSURE: 78 MMHG

## 2020-01-13 DIAGNOSIS — Z67.91 RH NEGATIVE STATE IN ANTEPARTUM PERIOD: ICD-10-CM

## 2020-01-13 DIAGNOSIS — O26.899 RH NEGATIVE STATE IN ANTEPARTUM PERIOD: ICD-10-CM

## 2020-01-13 DIAGNOSIS — O20.9 BLEEDING IN EARLY PREGNANCY: ICD-10-CM

## 2020-01-13 DIAGNOSIS — N91.2 AMENORRHEA: ICD-10-CM

## 2020-01-13 LAB
ABO/RH: NORMAL
ANTIBODY SCREEN: NORMAL
CONTROL: ABNORMAL
GONADOTROPIN, CHORIONIC (HCG) QUANT: ABNORMAL MIU/ML (ref 0–5.3)
PREGNANCY TEST URINE, POC: POSITIVE
PROGESTERONE LEVEL: 4.51 NG/ML

## 2020-01-13 PROCEDURE — 81025 URINE PREGNANCY TEST: CPT | Performed by: NURSE PRACTITIONER

## 2020-01-13 PROCEDURE — 99214 OFFICE O/P EST MOD 30 MIN: CPT | Performed by: NURSE PRACTITIONER

## 2020-01-13 ASSESSMENT — ENCOUNTER SYMPTOMS
RESPIRATORY NEGATIVE: 1
GASTROINTESTINAL NEGATIVE: 1
EYES NEGATIVE: 1

## 2020-01-13 NOTE — PROGRESS NOTES
Negative. Neurological: Negative. Psychiatric/Behavioral: Negative. Physical Exam  Vitals signs and nursing note reviewed. Constitutional:       General: She is not in acute distress. Appearance: She is well-developed. She is not diaphoretic. HENT:      Head: Normocephalic and atraumatic. Eyes:      Conjunctiva/sclera: Conjunctivae normal.      Pupils: Pupils are equal, round, and reactive to light. Neck:      Musculoskeletal: Normal range of motion. Pulmonary:      Effort: Pulmonary effort is normal.   Abdominal:      Tenderness: There is no guarding. Musculoskeletal: Normal range of motion. Comments: Normal ROM in all 4 extremities; normal gait   Skin:     General: Skin is warm and dry. Neurological:      Mental Status: She is alert and oriented to person, place, and time. Motor: No abnormal muscle tone. Coordination: Coordination normal.   Psychiatric:         Behavior: Behavior normal.          Diagnosis Orders   1. Amenorrhea  POCT urine pregnancy    HCG, Quantitative, Pregnancy   2. Bleeding in early pregnancy  HCG, Quantitative, Pregnancy    Progesterone    Type and Screen    Rhogam Immune Globulin, Antepartum   3. Rh negative state in antepartum period  Rhogam Immune Globulin, Antepartum       MEDICATIONS:  No orders of the defined types were placed in this encounter. ORDERS:  Orders Placed This Encounter   Procedures    HCG, Quantitative, Pregnancy    Progesterone    POCT urine pregnancy    Type and Screen    Rhogam Immune Globulin, Antepartum       PLAN:  Will check labs. Needs Rhogam  Booklet given. Follow up discussed pending results. Patient Instructions   We are committed to providing you with the best care possible. In order to help us achieve these goals please remember to bring all medications, herbal products, and over the counter supplements with you to each visit.     If your provider has ordered testing for you, please be sure to follow up with our office if you have not received results within 7 days after testing took place. *If you receive a survey after visiting one of our offices, please take the time to share your experience concerning your physician office visit. These surveys are confidential and no health information about you is shared. We are eager to improve for you and we are counting on your feedback to help make that happen.

## 2020-01-14 ENCOUNTER — OFFICE VISIT (OUTPATIENT)
Dept: OBGYN | Age: 25
End: 2020-01-14

## 2020-01-14 ENCOUNTER — HOSPITAL ENCOUNTER (OUTPATIENT)
Dept: ULTRASOUND IMAGING | Age: 25
Discharge: HOME | End: 2020-01-14

## 2020-01-14 DIAGNOSIS — O20.9 BLEEDING IN EARLY PREGNANCY: ICD-10-CM

## 2020-01-14 LAB
GONADOTROPIN, CHORIONIC (HCG) QUANT: ABNORMAL MIU/ML (ref 0–5.3)
RHIG LOT NUMBER: NORMAL

## 2020-01-14 PROCEDURE — 76817 TRANSVAGINAL US OBSTETRIC: CPT

## 2020-01-14 PROCEDURE — 96372 THER/PROPH/DIAG INJ SC/IM: CPT | Performed by: NURSE PRACTITIONER

## 2020-01-15 ENCOUNTER — TELEPHONE (OUTPATIENT)
Dept: OBGYN | Age: 25
End: 2020-01-15

## 2020-01-15 NOTE — TELEPHONE ENCOUNTER
Called and discussed quant that is slowly decreasing and US findings. States did start bleeding again and little heavier. Does not want to do D&C at this time. Will schedule US and repeat quant early next week.    Precautions given for SOB, filling more than pad an hour, dizziness, proceed to ER

## 2020-01-20 ENCOUNTER — HOSPITAL ENCOUNTER (OUTPATIENT)
Dept: ULTRASOUND IMAGING | Age: 25
Discharge: HOME | End: 2020-01-20

## 2020-01-20 ENCOUNTER — OFFICE VISIT (OUTPATIENT)
Dept: OBGYN | Age: 25
End: 2020-01-20

## 2020-01-20 VITALS
HEIGHT: 66 IN | WEIGHT: 156 LBS | SYSTOLIC BLOOD PRESSURE: 104 MMHG | HEART RATE: 64 BPM | BODY MASS INDEX: 25.07 KG/M2 | DIASTOLIC BLOOD PRESSURE: 69 MMHG

## 2020-01-20 DIAGNOSIS — O20.9 BLEEDING IN EARLY PREGNANCY: ICD-10-CM

## 2020-01-20 LAB — GONADOTROPIN, CHORIONIC (HCG) QUANT: 5497 MIU/ML (ref 0–5.3)

## 2020-01-20 PROCEDURE — 99213 OFFICE O/P EST LOW 20 MIN: CPT | Performed by: NURSE PRACTITIONER

## 2020-01-20 PROCEDURE — 76817 TRANSVAGINAL US OBSTETRIC: CPT

## 2020-01-20 ASSESSMENT — ENCOUNTER SYMPTOMS
DIARRHEA: 0
RESPIRATORY NEGATIVE: 1
ALLERGIC/IMMUNOLOGIC NEGATIVE: 1
CONSTIPATION: 0
EYES NEGATIVE: 1
GASTROINTESTINAL NEGATIVE: 1

## 2020-01-20 NOTE — PROGRESS NOTES
Aristides Meier is a 21 y.o. female who presents today for her medical conditions/ complaints as noted below. Aristides Meier is c/o of Miscarriage        HPI  Pt presents to f/u on bleeding and cramping and SAB. Still having some bleeding and clotting. HCG dropped from 17,000 to 15,000 last week. U/s shows still with retained products. Patient's last menstrual period was 2019 (exact date). [id number]    Past Medical History:   Diagnosis Date    Anemia      Past Surgical History:   Procedure Laterality Date     SECTION N/A 2017     SECTION performed by Max Herndon MD at [facility]D OR    [address]      as an infant    [place] REPAIR ENTEROCELE, [address] N/A 2018    VAGINAL CANAL REPAIR performed by Zenaida Ferrari MD at [facility]     History reviewed. No pertinent family history. Social History     Tobacco Use    Smoking status: Never Smoker    Smokeless tobacco: Never Used   Substance Use Topics    Alcohol use: No     Alcohol/week: 0.0 standard drinks       Current Outpatient Medications   Medication Sig Dispense Refill    ibuprofen (ADVIL;MOTRIN) 800 MG tablet Take 1 tablet by mouth every 8 hours 120 tablet 3    Prenatal MV-Min-Fe Fum-FA-DHA (PRENATAL 1 PO) Take by mouth      progesterone (PROMETRIUM) 200 MG capsule Take 1 capsule by mouth nightly Until the end of the 12th week of pregnancy. (Patient not taking: Reported on 2020) 30 capsule 3     No current facility-administered medications for this visit. No Known Allergies  Vitals:    20 1311   BP: 104/69   Pulse: 64     Body mass index is 25.18 kg/m². Review of Systems   Constitutional: Negative. HENT: Negative. Eyes: Negative. Respiratory: Negative. Cardiovascular: Negative. Gastrointestinal: Negative. Negative for constipation and diarrhea. Endocrine: Negative. Genitourinary: Positive for menstrual problem (bleeding with clotting) and pelvic pain (cramping).  Negative for frequency and urgency. Musculoskeletal: Negative. Skin: Negative. Allergic/Immunologic: Negative. Neurological: Negative. Hematological: Negative. Psychiatric/Behavioral: Negative. All other systems reviewed and are negative. Physical Exam  Vitals signs and nursing note reviewed. Constitutional:       Appearance: She is well-developed. HENT:      Head: Normocephalic. Right Ear: External ear normal.      Left Ear: External ear normal.      Nose: Nose normal.   Neck:      Musculoskeletal: Normal range of motion. Musculoskeletal: Normal range of motion. Skin:     General: Skin is warm and dry. Neurological:      Mental Status: She is alert and oriented to person, place, and time. Psychiatric:         Attention and Perception: Attention normal.         Mood and Affect: Mood normal.         Speech: Speech normal.         Behavior: Behavior normal.         Thought Content: Thought content normal.         Cognition and Memory: Cognition normal.         Judgment: Judgment normal.          Diagnosis Orders   1. Bleeding in early pregnancy  HCG, Quantitative, Pregnancy   2. Spontaneous          MEDICATIONS:  No orders of the defined types were placed in this encounter. ORDERS:  Orders Placed This Encounter   Procedures    HCG, Quantitative, Pregnancy       PLAN:  Discussed u/s and quants with pt understanding  Will expect more bleeding, cramping and clotting  Will f/u in 2 weeks to repeat quant  Gave precautions and will f/u if any problems    Patient Instructions   We are committed to providing you with the best care possible. In order to help us achieve these goals please remember to bring all medications, herbal products, and over the counter supplements with you to each visit. If your provider has ordered testing for you, please be sure to follow up with our office if you have not received results within 7 days after testing took place.     *If you receive a survey after visiting one of our offices, please take the time to share your experience concerning your physician office visit. These surveys are confidential and no health information about you is shared. We are eager to improve for you and we are counting on your feedback to help make that happen.

## 2020-03-10 ENCOUNTER — OFFICE VISIT (OUTPATIENT)
Dept: OBGYN | Age: 25
End: 2020-03-10

## 2020-03-10 VITALS
DIASTOLIC BLOOD PRESSURE: 75 MMHG | SYSTOLIC BLOOD PRESSURE: 122 MMHG | BODY MASS INDEX: 25.07 KG/M2 | HEART RATE: 77 BPM | WEIGHT: 156 LBS | HEIGHT: 66 IN

## 2020-03-10 DIAGNOSIS — Z87.59 HISTORY OF MISCARRIAGE: ICD-10-CM

## 2020-03-10 DIAGNOSIS — N91.2 AMENORRHEA: ICD-10-CM

## 2020-03-10 LAB
CONTROL: ABNORMAL
GONADOTROPIN, CHORIONIC (HCG) QUANT: 38.9 MIU/ML (ref 0–5.3)
PREGNANCY TEST URINE, POC: POSITIVE
PROGESTERONE LEVEL: 5.14 NG/ML

## 2020-03-10 PROCEDURE — 81025 URINE PREGNANCY TEST: CPT | Performed by: NURSE PRACTITIONER

## 2020-03-10 PROCEDURE — 99213 OFFICE O/P EST LOW 20 MIN: CPT | Performed by: NURSE PRACTITIONER

## 2020-03-10 ASSESSMENT — ENCOUNTER SYMPTOMS
GASTROINTESTINAL NEGATIVE: 1
EYES NEGATIVE: 1
RESPIRATORY NEGATIVE: 1

## 2020-03-10 NOTE — PROGRESS NOTES
Sheldon Schultz is a 21 y.o. female who presents today for her medical conditions/ complaints as noted below. Sheldon Schultz is c/o of Confirmation        HPI  Patient presents today for pregnancy confirmation. She states she hasn't had a period since miscarriage in January  This will be 4th pregnancy, two live births,  +UPT in office, faintly    No LMP recorded (lmp unknown). [id number]    Past Medical History:   Diagnosis Date    Anemia      Past Surgical History:   Procedure Laterality Date     SECTION N/A 2017     SECTION performed by Nori Gunn MD at [facility] L&D OR    [address]      as an infant    [place] REPAIR ENTEROCELE, [facility] N/A 2018    VAGINAL CANAL REPAIR performed by Jessica Wilson MD at [facility]     History reviewed. No pertinent family history. Social History     Tobacco Use    Smoking status: Never Smoker    Smokeless tobacco: Never Used   Substance Use Topics    Alcohol use: No     Alcohol/week: 0.0 standard drinks       Current Outpatient Medications   Medication Sig Dispense Refill    Prenatal MV-Min-Fe Fum-FA-DHA (PRENATAL 1 PO) Take by mouth      progesterone (PROMETRIUM) 200 MG capsule Take 1 capsule by mouth nightly Until the end of the 12th week of pregnancy. (Patient not taking: Reported on 2020) 30 capsule 3    ibuprofen (ADVIL;MOTRIN) 800 MG tablet Take 1 tablet by mouth every 8 hours (Patient not taking: Reported on 3/10/2020) 120 tablet 3     No current facility-administered medications for this visit. No Known Allergies  Vitals:    03/10/20 1606   BP: 122/75   Pulse: 77     Body mass index is 25.18 kg/m². Review of Systems   Constitutional: Negative. HENT: Negative. Eyes: Negative. Respiratory: Negative. Cardiovascular: Negative. Gastrointestinal: Negative. Genitourinary: Positive for menstrual problem.  Negative for difficulty urinating, dyspareunia, dysuria, enuresis, frequency, hematuria, pelvic pain, urgency and

## 2020-03-17 DIAGNOSIS — N91.2 AMENORRHEA: ICD-10-CM

## 2020-03-17 LAB — GONADOTROPIN, CHORIONIC (HCG) QUANT: 32.8 MIU/ML (ref 0–5.3)

## 2020-04-02 ENCOUNTER — TELEPHONE (OUTPATIENT)
Dept: OBGYN | Age: 25
End: 2020-04-02

## 2020-04-02 DIAGNOSIS — O03.9 SPONTANEOUS ABORTION: ICD-10-CM

## 2020-04-02 LAB — GONADOTROPIN, CHORIONIC (HCG) QUANT: 11.8 MIU/ML (ref 0–5.3)

## 2020-04-04 ENCOUNTER — HOSPITAL ENCOUNTER (EMERGENCY)
Age: 25
Discharge: HOME | End: 2020-04-04
Attending: EMERGENCY MEDICINE

## 2020-04-04 VITALS
DIASTOLIC BLOOD PRESSURE: 78 MMHG | SYSTOLIC BLOOD PRESSURE: 121 MMHG | WEIGHT: 160 LBS | OXYGEN SATURATION: 98 % | BODY MASS INDEX: 25.71 KG/M2 | RESPIRATION RATE: 15 BRPM | TEMPERATURE: 98.7 F | HEIGHT: 66 IN | HEART RATE: 84 BPM

## 2020-04-04 LAB
ANION GAP SERPL CALCULATED.3IONS-SCNC: 9 MMOL/L (ref 7–19)
BASOPHILS ABSOLUTE: 0 K/UL (ref 0–0.2)
BASOPHILS RELATIVE PERCENT: 0.7 % (ref 0–1)
BUN BLDV-MCNC: 9 MG/DL (ref 6–20)
CALCIUM SERPL-MCNC: 9.1 MG/DL (ref 8.6–10)
CHLORIDE BLD-SCNC: 99 MMOL/L (ref 98–111)
CO2: 27 MMOL/L (ref 22–29)
CREAT SERPL-MCNC: 0.8 MG/DL (ref 0.5–0.9)
EOSINOPHILS ABSOLUTE: 0 K/UL (ref 0–0.6)
EOSINOPHILS RELATIVE PERCENT: 0.9 % (ref 0–5)
GFR NON-AFRICAN AMERICAN: >60
GLUCOSE BLD-MCNC: 114 MG/DL (ref 74–109)
GONADOTROPIN, CHORIONIC (HCG) QUANT: 13.3 MIU/ML (ref 0–5.3)
HCT VFR BLD CALC: 34.8 % (ref 37–47)
HEMOGLOBIN: 10.9 G/DL (ref 12–16)
IMMATURE GRANULOCYTES #: 0 K/UL
LYMPHOCYTES ABSOLUTE: 1.8 K/UL (ref 1.1–4.5)
LYMPHOCYTES RELATIVE PERCENT: 41 % (ref 20–40)
MCH RBC QN AUTO: 26.8 PG (ref 27–31)
MCHC RBC AUTO-ENTMCNC: 31.3 G/DL (ref 33–37)
MCV RBC AUTO: 85.5 FL (ref 81–99)
MONOCYTES ABSOLUTE: 0.2 K/UL (ref 0–0.9)
MONOCYTES RELATIVE PERCENT: 5.3 % (ref 0–10)
NEUTROPHILS ABSOLUTE: 2.3 K/UL (ref 1.5–7.5)
NEUTROPHILS RELATIVE PERCENT: 51.9 % (ref 50–65)
PDW BLD-RTO: 13.7 % (ref 11.5–14.5)
PLATELET # BLD: 188 K/UL (ref 130–400)
PMV BLD AUTO: 10.5 FL (ref 9.4–12.3)
POTASSIUM SERPL-SCNC: 3.8 MMOL/L (ref 3.5–5)
RBC # BLD: 4.07 M/UL (ref 4.2–5.4)
SODIUM BLD-SCNC: 135 MMOL/L (ref 136–145)
WBC # BLD: 4.5 K/UL (ref 4.8–10.8)

## 2020-04-04 PROCEDURE — 36415 COLL VENOUS BLD VENIPUNCTURE: CPT

## 2020-04-04 PROCEDURE — 93005 ELECTROCARDIOGRAM TRACING: CPT | Performed by: EMERGENCY MEDICINE

## 2020-04-04 PROCEDURE — 85025 COMPLETE CBC W/AUTO DIFF WBC: CPT

## 2020-04-04 PROCEDURE — 84702 CHORIONIC GONADOTROPIN TEST: CPT

## 2020-04-04 PROCEDURE — 80048 BASIC METABOLIC PNL TOTAL CA: CPT

## 2020-04-04 PROCEDURE — 99284 EMERGENCY DEPT VISIT MOD MDM: CPT

## 2020-04-04 PROCEDURE — 2580000003 HC RX 258: Performed by: EMERGENCY MEDICINE

## 2020-04-04 RX ORDER — SODIUM CHLORIDE, SODIUM LACTATE, POTASSIUM CHLORIDE, CALCIUM CHLORIDE 600; 310; 30; 20 MG/100ML; MG/100ML; MG/100ML; MG/100ML
1000 INJECTION, SOLUTION INTRAVENOUS ONCE
Status: COMPLETED | OUTPATIENT
Start: 2020-04-04 | End: 2020-04-04

## 2020-04-04 RX ORDER — MEDROXYPROGESTERONE ACETATE 10 MG/1
10 TABLET ORAL DAILY
Qty: 10 TABLET | Refills: 0 | Status: ON HOLD | OUTPATIENT
Start: 2020-04-04 | End: 2020-04-13 | Stop reason: HOSPADM

## 2020-04-04 RX ADMIN — SODIUM CHLORIDE, SODIUM LACTATE, POTASSIUM CHLORIDE, AND CALCIUM CHLORIDE 1000 ML: 600; 310; 30; 20 INJECTION, SOLUTION INTRAVENOUS at 11:44

## 2020-04-05 ASSESSMENT — ENCOUNTER SYMPTOMS
ABDOMINAL PAIN: 0
BACK PAIN: 0
SHORTNESS OF BREATH: 0

## 2020-04-05 NOTE — ED PROVIDER NOTES
connections     Talks on phone: None     Gets together: None     Attends Islam service: None     Active member of club or organization: None     Attends meetings of clubs or organizations: None     Relationship status: None    Intimate partner violence     Fear of current or ex partner: None     Emotionally abused: None     Physically abused: None     Forced sexual activity: None   Other Topics Concern    None   Social History Narrative    None       SCREENINGS             PHYSICAL EXAM    (up to 7 for level 4, 8 or more for level 5)     ED Triage Vitals [04/04/20 1045]   BP Temp Temp Source Pulse Resp SpO2 Height Weight   121/78 98.7 °F (37.1 °C) Temporal 84 20 100 % 5' 6\" (1.676 m) 160 lb (72.6 kg)       Physical Exam  Vitals signs and nursing note reviewed. Constitutional:       General: She is not in acute distress. Appearance: Normal appearance. She is not ill-appearing, toxic-appearing or diaphoretic. HENT:      Head: Normocephalic and atraumatic. Nose: Nose normal.      Mouth/Throat:      Mouth: Mucous membranes are moist.   Eyes:      Extraocular Movements: Extraocular movements intact. Pupils: Pupils are equal, round, and reactive to light. Neck:      Musculoskeletal: Normal range of motion and neck supple. Cardiovascular:      Rate and Rhythm: Normal rate and regular rhythm. Pulses: Normal pulses. Pulmonary:      Effort: Pulmonary effort is normal.      Breath sounds: Normal breath sounds. Abdominal:      General: Abdomen is flat. Tenderness: There is no abdominal tenderness. Musculoskeletal: Normal range of motion. General: No swelling or tenderness. Skin:     General: Skin is warm and dry. Capillary Refill: Capillary refill takes less than 2 seconds. Neurological:      General: No focal deficit present. Mental Status: She is alert and oriented to person, place, and time.    Psychiatric:         Mood and Affect: Mood normal.

## 2020-04-06 ENCOUNTER — HOSPITAL ENCOUNTER (OUTPATIENT)
Dept: ULTRASOUND IMAGING | Age: 25
Discharge: HOME | End: 2020-04-06

## 2020-04-06 ENCOUNTER — TELEPHONE (OUTPATIENT)
Dept: OBGYN | Age: 25
End: 2020-04-06

## 2020-04-06 ENCOUNTER — OFFICE VISIT (OUTPATIENT)
Dept: OBGYN | Age: 25
End: 2020-04-06

## 2020-04-06 VITALS
WEIGHT: 160 LBS | HEIGHT: 66 IN | HEART RATE: 71 BPM | BODY MASS INDEX: 25.71 KG/M2 | SYSTOLIC BLOOD PRESSURE: 108 MMHG | DIASTOLIC BLOOD PRESSURE: 75 MMHG

## 2020-04-06 PROBLEM — Z37.9 NORMAL LABOR: Status: RESOLVED | Noted: 2018-08-20 | Resolved: 2020-04-06

## 2020-04-06 PROBLEM — R23.8 VENOUS LAKE: Status: RESOLVED | Noted: 2018-04-23 | Resolved: 2020-04-06

## 2020-04-06 PROBLEM — Z3A.40 40 WEEKS GESTATION OF PREGNANCY: Status: RESOLVED | Noted: 2018-08-21 | Resolved: 2020-04-06

## 2020-04-06 PROBLEM — Z98.891 PREVIOUS CESAREAN SECTION: Status: RESOLVED | Noted: 2018-01-22 | Resolved: 2020-04-06

## 2020-04-06 LAB
EKG P AXIS: 49 DEGREES
EKG P-R INTERVAL: 118 MS
EKG Q-T INTERVAL: 384 MS
EKG QRS DURATION: 84 MS
EKG QTC CALCULATION (BAZETT): 405 MS
EKG T AXIS: 45 DEGREES

## 2020-04-06 PROCEDURE — 93010 ELECTROCARDIOGRAM REPORT: CPT | Performed by: INTERNAL MEDICINE

## 2020-04-06 PROCEDURE — 76830 TRANSVAGINAL US NON-OB: CPT

## 2020-04-06 PROCEDURE — 99213 OFFICE O/P EST LOW 20 MIN: CPT | Performed by: ADVANCED PRACTICE MIDWIFE

## 2020-04-06 ASSESSMENT — ENCOUNTER SYMPTOMS
EYES NEGATIVE: 1
GASTROINTESTINAL NEGATIVE: 1
ALLERGIC/IMMUNOLOGIC NEGATIVE: 1
RESPIRATORY NEGATIVE: 1

## 2020-04-06 NOTE — PATIENT INSTRUCTIONS
sex until the bleeding stops. · You may return to your normal activities if you feel well enough to do so. But you should avoid heavy exercise until the bleeding stops. · If you would like to try to get pregnant again, it is usually safe whenever you feel ready. Talk with your doctor about any future pregnancy plans. · If you do not want to get pregnant, ask your doctor about birth control. You can get pregnant again before your next period starts. · You may be low in iron because of blood loss. Eat a balanced diet that is high in iron and vitamin C. Foods rich in iron include red meat, shellfish, eggs, beans, and leafy green vegetables. Talk to your doctor about whether you need to take iron pills or a multivitamin. · The loss of a pregnancy can be very hard. Give yourself and your partner time to grieve. Even if your miscarriage occurred very early, you may still have feelings of loss. You may wonder why it happened and blame yourself. ? Talking to family members, friends, or a counselor may help you cope with your loss. ? If your feelings of sadness last longer than 2 weeks, tell your doctor or a counselor. When should you call for help? Call 911 anytime you think you may need emergency care. For example, call if:    · You passed out (lost consciousness).    Call your doctor now or seek immediate medical care if:    · You have severe vaginal bleeding.     · You are dizzy or lightheaded, or you feel like you may faint.     · You have a fever.     · You have new or worse pain in your belly or pelvis.     · You have vaginal discharge that smells bad.    Watch closely for changes in your health, and be sure to contact your doctor if:    · You do not get better as expected. Where can you learn more? Go to https://chpadma[facility]. org and sign in to your [employer] account. Enter L103 in the Bkam box to learn more about \"Incomplete Miscarriage: Care Instructions. \"     If you do

## 2020-04-08 ENCOUNTER — TELEPHONE (OUTPATIENT)
Dept: OBGYN | Age: 25
End: 2020-04-08

## 2020-04-08 NOTE — TELEPHONE ENCOUNTER
Returned call to pt, told pt Dr. Corinne Zabala says she needs a D&C, can have Friday or Monday. Pt will call back with day.

## 2020-04-13 ENCOUNTER — HOSPITAL ENCOUNTER (OUTPATIENT)
Age: 25
Setting detail: OUTPATIENT SURGERY
Discharge: HOME | End: 2020-04-13
Attending: OBSTETRICS & GYNECOLOGY | Admitting: OBSTETRICS & GYNECOLOGY

## 2020-04-13 ENCOUNTER — ANESTHESIA (OUTPATIENT)
Dept: OPERATING ROOM | Age: 25
End: 2020-04-13

## 2020-04-13 ENCOUNTER — ANESTHESIA EVENT (OUTPATIENT)
Dept: OPERATING ROOM | Age: 25
End: 2020-04-13

## 2020-04-13 VITALS
DIASTOLIC BLOOD PRESSURE: 66 MMHG | BODY MASS INDEX: 25.71 KG/M2 | OXYGEN SATURATION: 100 % | SYSTOLIC BLOOD PRESSURE: 100 MMHG | WEIGHT: 160 LBS | RESPIRATION RATE: 16 BRPM | TEMPERATURE: 97.9 F | HEART RATE: 65 BPM | HEIGHT: 66 IN

## 2020-04-13 VITALS
TEMPERATURE: 98 F | RESPIRATION RATE: 11 BRPM | DIASTOLIC BLOOD PRESSURE: 53 MMHG | OXYGEN SATURATION: 100 % | SYSTOLIC BLOOD PRESSURE: 87 MMHG

## 2020-04-13 LAB
ABO/RH: NORMAL
ABO/RH: NORMAL
ANTIBODY IDENTIFICATION: NORMAL
ANTIBODY SCREEN: NORMAL
FETAL SCREEN: NORMAL
RHIG LOT NUMBER: NORMAL

## 2020-04-13 PROCEDURE — 6360000002 HC RX W HCPCS: Performed by: NURSE ANESTHETIST, CERTIFIED REGISTERED

## 2020-04-13 PROCEDURE — 3700000000 HC ANESTHESIA ATTENDED CARE: Performed by: OBSTETRICS & GYNECOLOGY

## 2020-04-13 PROCEDURE — 86900 BLOOD TYPING SEROLOGIC ABO: CPT

## 2020-04-13 PROCEDURE — 3700000001 HC ADD 15 MINUTES (ANESTHESIA): Performed by: OBSTETRICS & GYNECOLOGY

## 2020-04-13 PROCEDURE — 85461 HEMOGLOBIN FETAL: CPT

## 2020-04-13 PROCEDURE — 7100000000 HC PACU RECOVERY - FIRST 15 MIN: Performed by: OBSTETRICS & GYNECOLOGY

## 2020-04-13 PROCEDURE — 3600000004 HC SURGERY LEVEL 4 BASE: Performed by: OBSTETRICS & GYNECOLOGY

## 2020-04-13 PROCEDURE — 7100000001 HC PACU RECOVERY - ADDTL 15 MIN: Performed by: OBSTETRICS & GYNECOLOGY

## 2020-04-13 PROCEDURE — 2500000003 HC RX 250 WO HCPCS: Performed by: OBSTETRICS & GYNECOLOGY

## 2020-04-13 PROCEDURE — 3600000014 HC SURGERY LEVEL 4 ADDTL 15MIN: Performed by: OBSTETRICS & GYNECOLOGY

## 2020-04-13 PROCEDURE — 86901 BLOOD TYPING SEROLOGIC RH(D): CPT

## 2020-04-13 PROCEDURE — 7100000010 HC PHASE II RECOVERY - FIRST 15 MIN: Performed by: OBSTETRICS & GYNECOLOGY

## 2020-04-13 PROCEDURE — 6360000002 HC RX W HCPCS: Performed by: OBSTETRICS & GYNECOLOGY

## 2020-04-13 PROCEDURE — 2580000003 HC RX 258: Performed by: ANESTHESIOLOGY

## 2020-04-13 PROCEDURE — 2580000003 HC RX 258: Performed by: OBSTETRICS & GYNECOLOGY

## 2020-04-13 PROCEDURE — 86870 RBC ANTIBODY IDENTIFICATION: CPT

## 2020-04-13 PROCEDURE — 86850 RBC ANTIBODY SCREEN: CPT

## 2020-04-13 PROCEDURE — 36415 COLL VENOUS BLD VENIPUNCTURE: CPT

## 2020-04-13 PROCEDURE — 2709999900 HC NON-CHARGEABLE SUPPLY: Performed by: OBSTETRICS & GYNECOLOGY

## 2020-04-13 PROCEDURE — 59812 TREATMENT OF MISCARRIAGE: CPT | Performed by: OBSTETRICS & GYNECOLOGY

## 2020-04-13 PROCEDURE — 88305 TISSUE EXAM BY PATHOLOGIST: CPT

## 2020-04-13 PROCEDURE — 2500000003 HC RX 250 WO HCPCS: Performed by: NURSE ANESTHETIST, CERTIFIED REGISTERED

## 2020-04-13 RX ORDER — HYDROCODONE BITARTRATE AND ACETAMINOPHEN 5; 325 MG/1; MG/1
1 TABLET ORAL EVERY 6 HOURS PRN
Qty: 12 TABLET | Refills: 0 | Status: SHIPPED | OUTPATIENT
Start: 2020-04-13 | End: 2020-04-16

## 2020-04-13 RX ORDER — METHYLERGONOVINE MALEATE 0.2 MG/ML
INJECTION INTRAVENOUS PRN
Status: DISCONTINUED | OUTPATIENT
Start: 2020-04-13 | End: 2020-04-13 | Stop reason: SDUPTHER

## 2020-04-13 RX ORDER — ONDANSETRON 2 MG/ML
INJECTION INTRAMUSCULAR; INTRAVENOUS PRN
Status: DISCONTINUED | OUTPATIENT
Start: 2020-04-13 | End: 2020-04-13 | Stop reason: SDUPTHER

## 2020-04-13 RX ORDER — MORPHINE SULFATE 4 MG/ML
2 INJECTION, SOLUTION INTRAMUSCULAR; INTRAVENOUS EVERY 5 MIN PRN
Status: DISCONTINUED | OUTPATIENT
Start: 2020-04-13 | End: 2020-04-13 | Stop reason: HOSPADM

## 2020-04-13 RX ORDER — DEXAMETHASONE SODIUM PHOSPHATE 10 MG/ML
INJECTION, SOLUTION INTRAMUSCULAR; INTRAVENOUS PRN
Status: DISCONTINUED | OUTPATIENT
Start: 2020-04-13 | End: 2020-04-13 | Stop reason: SDUPTHER

## 2020-04-13 RX ORDER — SODIUM CHLORIDE, SODIUM LACTATE, POTASSIUM CHLORIDE, CALCIUM CHLORIDE 600; 310; 30; 20 MG/100ML; MG/100ML; MG/100ML; MG/100ML
INJECTION, SOLUTION INTRAVENOUS CONTINUOUS
Status: DISCONTINUED | OUTPATIENT
Start: 2020-04-13 | End: 2020-04-13 | Stop reason: HOSPADM

## 2020-04-13 RX ORDER — FENTANYL CITRATE 50 UG/ML
INJECTION, SOLUTION INTRAMUSCULAR; INTRAVENOUS PRN
Status: DISCONTINUED | OUTPATIENT
Start: 2020-04-13 | End: 2020-04-13 | Stop reason: SDUPTHER

## 2020-04-13 RX ORDER — PROPOFOL 10 MG/ML
INJECTION, EMULSION INTRAVENOUS PRN
Status: DISCONTINUED | OUTPATIENT
Start: 2020-04-13 | End: 2020-04-13 | Stop reason: SDUPTHER

## 2020-04-13 RX ORDER — LIDOCAINE HYDROCHLORIDE 10 MG/ML
INJECTION, SOLUTION INFILTRATION; PERINEURAL PRN
Status: DISCONTINUED | OUTPATIENT
Start: 2020-04-13 | End: 2020-04-13 | Stop reason: SDUPTHER

## 2020-04-13 RX ORDER — ENALAPRILAT 2.5 MG/2ML
1.25 INJECTION INTRAVENOUS
Status: DISCONTINUED | OUTPATIENT
Start: 2020-04-13 | End: 2020-04-13 | Stop reason: HOSPADM

## 2020-04-13 RX ORDER — MORPHINE SULFATE 4 MG/ML
4 INJECTION, SOLUTION INTRAMUSCULAR; INTRAVENOUS EVERY 5 MIN PRN
Status: DISCONTINUED | OUTPATIENT
Start: 2020-04-13 | End: 2020-04-13 | Stop reason: HOSPADM

## 2020-04-13 RX ORDER — KETOROLAC TROMETHAMINE 30 MG/ML
INJECTION, SOLUTION INTRAMUSCULAR; INTRAVENOUS PRN
Status: DISCONTINUED | OUTPATIENT
Start: 2020-04-13 | End: 2020-04-13 | Stop reason: SDUPTHER

## 2020-04-13 RX ORDER — HYDROMORPHONE HYDROCHLORIDE 1 MG/ML
0.25 INJECTION, SOLUTION INTRAMUSCULAR; INTRAVENOUS; SUBCUTANEOUS EVERY 5 MIN PRN
Status: DISCONTINUED | OUTPATIENT
Start: 2020-04-13 | End: 2020-04-13 | Stop reason: HOSPADM

## 2020-04-13 RX ORDER — MEPERIDINE HYDROCHLORIDE 50 MG/ML
12.5 INJECTION INTRAMUSCULAR; INTRAVENOUS; SUBCUTANEOUS EVERY 5 MIN PRN
Status: DISCONTINUED | OUTPATIENT
Start: 2020-04-13 | End: 2020-04-13 | Stop reason: HOSPADM

## 2020-04-13 RX ORDER — DIPHENHYDRAMINE HYDROCHLORIDE 50 MG/ML
12.5 INJECTION INTRAMUSCULAR; INTRAVENOUS
Status: DISCONTINUED | OUTPATIENT
Start: 2020-04-13 | End: 2020-04-13 | Stop reason: HOSPADM

## 2020-04-13 RX ORDER — METOCLOPRAMIDE HYDROCHLORIDE 5 MG/ML
10 INJECTION INTRAMUSCULAR; INTRAVENOUS
Status: DISCONTINUED | OUTPATIENT
Start: 2020-04-13 | End: 2020-04-13 | Stop reason: HOSPADM

## 2020-04-13 RX ORDER — LABETALOL 20 MG/4 ML (5 MG/ML) INTRAVENOUS SYRINGE
5 EVERY 10 MIN PRN
Status: DISCONTINUED | OUTPATIENT
Start: 2020-04-13 | End: 2020-04-13 | Stop reason: HOSPADM

## 2020-04-13 RX ORDER — HYDROMORPHONE HYDROCHLORIDE 1 MG/ML
0.5 INJECTION, SOLUTION INTRAMUSCULAR; INTRAVENOUS; SUBCUTANEOUS EVERY 5 MIN PRN
Status: DISCONTINUED | OUTPATIENT
Start: 2020-04-13 | End: 2020-04-13 | Stop reason: HOSPADM

## 2020-04-13 RX ORDER — HYDRALAZINE HYDROCHLORIDE 20 MG/ML
5 INJECTION INTRAMUSCULAR; INTRAVENOUS EVERY 10 MIN PRN
Status: DISCONTINUED | OUTPATIENT
Start: 2020-04-13 | End: 2020-04-13 | Stop reason: HOSPADM

## 2020-04-13 RX ORDER — PROMETHAZINE HYDROCHLORIDE 25 MG/ML
6.25 INJECTION, SOLUTION INTRAMUSCULAR; INTRAVENOUS
Status: DISCONTINUED | OUTPATIENT
Start: 2020-04-13 | End: 2020-04-13 | Stop reason: HOSPADM

## 2020-04-13 RX ADMIN — KETOROLAC TROMETHAMINE 30 MG: 30 INJECTION, SOLUTION INTRAMUSCULAR; INTRAVENOUS at 09:45

## 2020-04-13 RX ADMIN — ONDANSETRON HYDROCHLORIDE 4 MG: 2 INJECTION, SOLUTION INTRAMUSCULAR; INTRAVENOUS at 09:32

## 2020-04-13 RX ADMIN — LIDOCAINE HYDROCHLORIDE 50 MG: 10 INJECTION, SOLUTION INFILTRATION; PERINEURAL at 09:28

## 2020-04-13 RX ADMIN — FENTANYL CITRATE 100 MCG: 50 INJECTION INTRAMUSCULAR; INTRAVENOUS at 09:28

## 2020-04-13 RX ADMIN — SODIUM CHLORIDE, POTASSIUM CHLORIDE, SODIUM LACTATE AND CALCIUM CHLORIDE: 600; 310; 30; 20 INJECTION, SOLUTION INTRAVENOUS at 08:04

## 2020-04-13 RX ADMIN — HUMAN RHO(D) IMMUNE GLOBULIN 300 MCG: 300 INJECTION, SOLUTION INTRAMUSCULAR at 13:32

## 2020-04-13 RX ADMIN — METHYLERGONOVINE MALEATE 200 MCG: 0.2 INJECTION, SOLUTION INTRAMUSCULAR; INTRAVENOUS at 10:01

## 2020-04-13 RX ADMIN — DEXAMETHASONE SODIUM PHOSPHATE 10 MG: 10 INJECTION, SOLUTION INTRAMUSCULAR; INTRAVENOUS at 09:32

## 2020-04-13 RX ADMIN — DOXYCYCLINE 200 MG: 100 INJECTION, POWDER, LYOPHILIZED, FOR SOLUTION INTRAVENOUS at 10:15

## 2020-04-13 RX ADMIN — PROPOFOL 150 MG: 10 INJECTION, EMULSION INTRAVENOUS at 09:28

## 2020-04-13 ASSESSMENT — PAIN SCALES - GENERAL
PAINLEVEL_OUTOF10: 0

## 2020-04-13 ASSESSMENT — PAIN - FUNCTIONAL ASSESSMENT: PAIN_FUNCTIONAL_ASSESSMENT: 0-10

## 2020-04-13 ASSESSMENT — LIFESTYLE VARIABLES: SMOKING_STATUS: 0

## 2020-04-13 NOTE — H&P
HPI  Margy Garland is a 21 y.o. female with h/o incomplete  who presents for surgical management. Patient continues to have vaginal bleeding that she reports increased over the weekend. Past Medical History:   Diagnosis Date    Anemia     Miscarriage      Past Surgical History:   Procedure Laterality Date     SECTION N/A 2017     SECTION performed by Marlon So MD at [facility]    [address]      as an infant    [place] REPAIR ENTEROCELE, [address] N/A 2018    VAGINAL CANAL REPAIR performed by Tino Dandy, MD at [address] [place] [address]     History reviewed. No pertinent family history. Social History     Tobacco Use    Smoking status: Never Smoker    Smokeless tobacco: Never Used   Substance Use Topics    Alcohol use: No     Alcohol/week: 0.0 standard drinks    Drug use: No     No current facility-administered medications on file prior to encounter. Current Outpatient Medications on File Prior to Encounter   Medication Sig Dispense Refill    medroxyPROGESTERone (PROVERA) 10 MG tablet Take 1 tablet by mouth daily 10 tablet 0    ibuprofen (ADVIL;MOTRIN) 800 MG tablet Take 1 tablet by mouth every 8 hours 120 tablet 3    Prenatal MV-Min-Fe Fum-FA-DHA (PRENATAL 1 PO) Take by mouth       No Known Allergies  /71   Pulse 83   Temp 97.6 °F (36.4 °C) (Temporal)   Resp 16   Ht 5' 6\" (1.676 m)   Wt 160 lb (72.6 kg)   LMP 2020 (Exact Date)   SpO2 100%   BMI 25.82 kg/m²   Physical Exam  Constitutional:       General: She is not in acute distress. Appearance: She is well-developed. HENT:      Head: Normocephalic and atraumatic. Eyes:      Conjunctiva/sclera: Conjunctivae normal.      Pupils: Pupils are equal, round, and reactive to light. Neck:      Musculoskeletal: Normal range of motion and neck supple. Cardiovascular:      Rate and Rhythm: Normal rate and regular rhythm.    Pulmonary:      Effort: Pulmonary effort is normal. No respiratory

## 2020-04-13 NOTE — BRIEF OP NOTE
Department of Gynecology  Brief Operative Report        Pre-operative Diagnosis:  Incomplete     Post-operative Diagnosis:  Incomplete     Procedure:  Suction D&C    Surgeon:  Onelia Monterroso     Anesthesia:  General Laryngeal Mask Airway Anesthesia    Estimated blood loss:  200ml    Drains:  none    Specimens:  POC    Complications:  none    Condition:  stable    See dictated operative report for full details.

## 2020-04-13 NOTE — OP NOTE
PREOPERATIVE DIAGNOSES:   1. Incomplete     POSTOPERATIVE DIAGNOSES:   1. Incomplete      PROCEDURE PERFORMED:    1. EUA  2. Suction Dilation and Curettage      ANESTHESIA:  General    SURGEON:  Dr. Joellen Zendejas    ESTIMATED BLOOD LOSS:  150 mL    COMPLICATIONS:  None    SPECIMENS:  Products of conception    FINDINGS:  On bimanual exam, there is a 8-week size anteverted mobile uterus. DESCRIPTION OF PROCEDURE:  The patient was taken to the operating room where she was placed under general anesthesia. She was positioned in dorsal lithotomy, prepped and draped in usual sterile fashion. A weighted speculum was placed into the vagina and the anterior lip of the cervix was grasped with a single-tooth tenaculum. The os was serially dilated to accommodate a 9 mm currette which was placed without complication. Serial curettage was performed until only blood visible in tubing. Sharp curettage performed with uterine cry appreciated throughout the cavity. At this time, the tenaculum was removed from the anterior lip of the cervix. There was active bleeding controlled by stitches of 0-vicryl at the right site. Monsel's placed over the tenaculum sites with excellent hemostasis noted. The procedure was concluded. The patient was awakened in the operating room and brought to PACU in stable condition.

## 2020-04-13 NOTE — ANESTHESIA PRE PROCEDURE
Health Maintenance Due   Topic Date Due   • Lung Cancer Screening  07/02/2000   • Colorectal Cancer Screening-Colonoscopy  06/06/2017   • Medicare Wellness 65+  06/18/2019   • Diabetes A1C  09/07/2019       Patient is due for the topics as listed above and wishes to proceed with colonoscopy. He stated he he will complete Lung Cancer Screening.    Over the last 2 weeks, how often have you been bothered by the following problems?          PHQ2 Score:  2  1. Little interest or pleasure in doing things?:  1  2. Feeling down, depressed, or hopeless?:  1              TempSrc: Temporal   SpO2: 100%   Weight: 160 lb (72.6 kg)   Height: 5' 6\" (1.676 m)                                              BP Readings from Last 3 Encounters:   04/13/20 112/71   04/06/20 108/75   04/04/20 121/78       NPO Status: Time of last liquid consumption: 0000                        Time of last solid consumption: 1900                        Date of last liquid consumption: 04/12/20                        Date of last solid food consumption: 04/12/20    BMI:   Wt Readings from Last 3 Encounters:   04/13/20 160 lb (72.6 kg)   04/06/20 160 lb (72.6 kg)   04/04/20 160 lb (72.6 kg)     Body mass index is 25.82 kg/m². CBC:   Lab Results   Component Value Date    WBC 4.5 04/04/2020    RBC 4.07 04/04/2020    HGB 10.9 04/04/2020    HCT 34.8 04/04/2020    MCV 85.5 04/04/2020    RDW 13.7 04/04/2020     04/04/2020       CMP:   Lab Results   Component Value Date     04/04/2020    K 3.8 04/04/2020    CL 99 04/04/2020    CO2 27 04/04/2020    BUN 9 04/04/2020    CREATININE 0.8 04/04/2020    LABGLOM >60 04/04/2020    GLUCOSE 114 04/04/2020    CALCIUM 9.1 04/04/2020       POC Tests: No results for input(s): POCGLU, POCNA, POCK, POCCL, POCBUN, POCHEMO, POCHCT in the last 72 hours.     Coags: No results found for: PROTIME, INR, APTT    HCG (If Applicable):   Lab Results   Component Value Date    PREGTESTUR positive 03/10/2020        ABGs: No results found for: PHART, PO2ART, JLS2GEO, PGF2NMS, BEART, K1TFIEPJ     Type & Screen (If Applicable):  No results found for: LABABO, [address]    Anesthesia Evaluation  Patient summary reviewed and Nursing notes reviewed no history of anesthetic complications:   Airway: Mallampati: I  TM distance: >3 FB   Neck ROM: full  Mouth opening: > = 3 FB Dental:          Pulmonary:Negative Pulmonary ROS and normal exam        (-) not a current smoker                           Cardiovascular:Negative CV ROS             Beta Blocker:  Not on Beta Blocker         Neuro/Psych:

## 2020-04-15 ENCOUNTER — TELEMEDICINE (OUTPATIENT)
Dept: OBGYN | Age: 25
End: 2020-04-15

## 2020-04-15 PROCEDURE — 99024 POSTOP FOLLOW-UP VISIT: CPT | Performed by: OBSTETRICS & GYNECOLOGY

## 2020-04-15 ASSESSMENT — ENCOUNTER SYMPTOMS
GASTROINTESTINAL NEGATIVE: 1
RESPIRATORY NEGATIVE: 1
EYES NEGATIVE: 1

## 2020-04-15 NOTE — PROGRESS NOTES
Betty Mccauley is a 21 y.o.  who presents today via video visit for follow up after ER visit on 20. Pt had a suction D&C on 20. Pt states she is feeling weak but not having pain. Pt is having coffee ground like discharge. Review of Systems   Constitutional: Negative. HENT: Negative. Eyes: Negative. Respiratory: Negative. Cardiovascular: Negative. Gastrointestinal: Negative. Endocrine: Negative. Genitourinary: Negative. Negative for dysuria, frequency, menstrual problem, pelvic pain, urgency and vaginal discharge. Musculoskeletal: Negative. Skin: Negative. Allergic/Immunologic: Negative. Neurological: Negative. Hematological: Negative. Psychiatric/Behavioral: Negative. Past Medical History:   Diagnosis Date    Anemia     Miscarriage        Past Surgical History:   Procedure Laterality Date     SECTION N/A 2017     SECTION performed by Forest Aranda MD at [facility] L[facility]D OR    DILATION AND CURETTAGE OF UTERUS N/A 2020    DILATATION AND CURETTAGE SUCTION performed by Irma Holland MD at [facility]      as an infant    [place] REPAIR ENTEROCELE, [address] N/A 2018    VAGINAL CANAL REPAIR performed by Irma Holland MD at [address]       No family history on file. Social History     Socioeconomic History    Marital status:      Spouse name: Not on file    Number of children: Not on file    Years of education: Not on file    Highest education level: Not on file   Occupational History    Not on file   Social Needs    Financial resource strain: Not on file    Food insecurity     Worry: Not on file     Inability: Not on file    Transportation needs     Medical: Not on file     Non-medical: Not on file   Tobacco Use    Smoking status: Never Smoker    Smokeless tobacco: Never Used   Substance and Sexual Activity    Alcohol use: No     Alcohol/week: 0.0 standard drinks    Drug use:  No  Sexual activity: Yes     Partners: Male   Lifestyle    Physical activity     Days per week: Not on file     Minutes per session: Not on file    Stress: Not on file   Relationships    Social connections     Talks on phone: Not on file     Gets together: Not on file     Attends Christianity service: Not on file     Active member of club or organization: Not on file     Attends meetings of clubs or organizations: Not on file     Relationship status: Not on file    Intimate partner violence     Fear of current or ex partner: Not on file     Emotionally abused: Not on file     Physically abused: Not on file     Forced sexual activity: Not on file   Other Topics Concern    Not on file   Social History Narrative    Not on file         Current Outpatient Medications:     HYDROcodone-acetaminophen (Georgianne Claudia) 5-325 MG per tablet, Take 1 tablet by mouth every 6 hours as needed for Pain for up to 3 days. Intended supply: 3 days. Take lowest dose possible to manage pain, Disp: 12 tablet, Rfl: 0    ibuprofen (ADVIL;MOTRIN) 800 MG tablet, Take 1 tablet by mouth every 8 hours, Disp: 120 tablet, Rfl: 3    Prenatal MV-Min-Fe Fum-FA-DHA (PRENATAL 1 PO), Take by mouth, Disp: , Rfl:     No Known Allergies    LMP 03/20/2020 (Exact Date)   Physical Exam  Constitutional:       General: She is not in acute distress. Appearance: She is well-developed. HENT:      Head: Normocephalic and atraumatic. Eyes:      Conjunctiva/sclera: Conjunctivae normal.      Pupils: Pupils are equal, round, and reactive to light. Neck:      Musculoskeletal: Normal range of motion. Pulmonary:      Effort: Pulmonary effort is normal. No respiratory distress. Musculoskeletal: Normal range of motion. Skin:     Findings: No rash. Neurological:      Mental Status: She is alert and oriented to person, place, and time. Cranial Nerves: No cranial nerve deficit.    Psychiatric:         Speech: Speech normal.         Behavior: Behavior normal. Thought Content: Thought content normal.         Judgment: Judgment normal.         KL    FINAL DIAGNOSIS:    Products of conception:  Chorionic villi identified   CLR/CLR      Assessment   Diagnosis Orders   1. Episode of heavy vaginal bleeding     2. Postop check         Plan     1. Surgical pathology results discussed, told POC confirmed. 2. Told after she stops bleeding,she should have a period 4 weeks after. 3. RTC prn. Barron Fisher, am scribing for and in the presence of Dr. Sharon Anna. I, Dr. Sharon Anna, personally performed the services described in this documentation as scribed by Armond Cosby in my presence, and it is both accurate and complete.

## 2020-04-24 ASSESSMENT — ENCOUNTER SYMPTOMS: ALLERGIC/IMMUNOLOGIC NEGATIVE: 1

## 2020-05-14 RX ORDER — CEPHALEXIN 500 MG/1
500 CAPSULE ORAL 2 TIMES DAILY
Qty: 14 CAPSULE | Refills: 0 | Status: SHIPPED | OUTPATIENT
Start: 2020-05-14 | End: 2020-07-05

## 2020-05-21 DIAGNOSIS — N91.2 AMENORRHEA: ICD-10-CM

## 2020-05-21 LAB — GONADOTROPIN, CHORIONIC (HCG) QUANT: 520.4 MIU/ML (ref 0–5.3)

## 2020-05-27 ENCOUNTER — TELEMEDICINE (OUTPATIENT)
Dept: OBGYN | Age: 25
End: 2020-05-27

## 2020-05-27 PROCEDURE — 99213 OFFICE O/P EST LOW 20 MIN: CPT | Performed by: OBSTETRICS & GYNECOLOGY

## 2020-05-27 ASSESSMENT — ENCOUNTER SYMPTOMS
EYES NEGATIVE: 1
RESPIRATORY NEGATIVE: 1
ALLERGIC/IMMUNOLOGIC NEGATIVE: 1
GASTROINTESTINAL NEGATIVE: 1

## 2020-05-27 NOTE — PROGRESS NOTES
2020    TELEHEALTH EVALUATION -- Audio/Visual (During SZPPA-74 public health emergency)    HPI:    Ashok Fuentes (:  1995) has requested an audio/video evaluation for the following concern(s):    Patient presents with c/o Amenorrhea and +HPT. States that extreme fatigue prompted her to take a test.  Reports intercourse 2 weeks following D&C. She is feeling well. No N/V. Review of Systems   Constitutional: Positive for fatigue. HENT: Negative. Eyes: Negative. Respiratory: Negative. Cardiovascular: Negative. Gastrointestinal: Negative. Endocrine: Negative. Genitourinary: Positive for menstrual problem. Musculoskeletal: Negative. Skin: Negative. Allergic/Immunologic: Negative. Neurological: Negative. Hematological: Negative. Psychiatric/Behavioral: Negative. Prior to Visit Medications    Medication Sig Taking? Authorizing Provider   ibuprofen (ADVIL;MOTRIN) 800 MG tablet Take 1 tablet by mouth every 8 hours  James Brown MD   Prenatal MV-Min-Fe Fum-FA-DHA (PRENATAL 1 PO) Take by mouth  Historical Provider, MD       Social History     Tobacco Use    Smoking status: Never Smoker    Smokeless tobacco: Never Used   Substance Use Topics    Alcohol use: No     Alcohol/week: 0.0 standard drinks    Drug use:  No            PHYSICAL EXAMINATION:  [ INSTRUCTIONS:  \"[x]\" Indicates a positive item  \"[]\" Indicates a negative item  -- DELETE ALL ITEMS NOT EXAMINED]  Vital Signs: (As obtained by patient/caregiver or practitioner observation)    Blood pressure-  Heart rate-    Respiratory rate-    Temperature-  Pulse oximetry-     Constitutional: [x] Appears well-developed and well-nourished [x] No apparent distress      [] Abnormal-   Mental status  [x] Alert and awake  [x] Oriented to person/place/time []Able to follow commands      Eyes:  EOM    [x]  Normal  [] Abnormal-  Sclera  [x]  Normal  [] Abnormal -         Discharge [x]  None visible  [] Abnormal -    HENT:   [x] Normocephalic, atraumatic. [] Abnormal   [x] Mouth/Throat: Mucous membranes are moist.     External Ears [x] Normal  [] Abnormal-     Neck: [x] No visualized mass     Pulmonary/Chest: [x] Respiratory effort normal.  [x] No visualized signs of difficulty breathing or respiratory distress        [] Abnormal-      Musculoskeletal:   [x] Normal gait with no signs of ataxia         [x] Normal range of motion of neck        [] Abnormal-       Neurological:        [x] No Facial Asymmetry (Cranial nerve 7 motor function) (limited exam to video visit)          [x] No gaze palsy        [] Abnormal-         Skin:        [x] No significant exanthematous lesions or discoloration noted on facial skin         [] Abnormal-            Psychiatric:       [x] Normal Affect [x] No Hallucinations        [] Abnormal-     Other pertinent observable physical exam findings-     Results for Brielle Valenzuela (MRN [id number]) as of 5/27/2020 14:52   Ref. Range 5/21/2020 12:08   hCG Quant Latest Ref Range: 0.0 - 5.3 mIU/mL 520.4 (H)       ASSESSMENT/PLAN:   Diagnosis Orders   1. Amenorrhea  US OB LESS THAN 14 WEEKS SINGLE OR FIRST GESTATION    C.trachomatis N.gonorrhoeae DNA, Urine    Hemoglobinopathy Eval (Electrophoresis)    HIV Obstetric Panel    Type and Screen    Varicella Zoster Antibody, IgG    HCG, Quantitative, Pregnancy   2. S/P dilation and curettage  US OB LESS THAN 14 WEEKS SINGLE OR FIRST GESTATION   3. Encounter to determine fetal viability of pregnancy, single or unspecified fetus  US OB LESS THAN [address]  1. Repeat Hcg and schedule sono  2. Pregnancy precautions  3. All questions answered  4. Return in 2 wks    Bert Bourgeois is a 21 y.o. female being evaluated by a Virtual Visit (video visit) encounter to address concerns as mentioned above. A caregiver was present when appropriate.  Due to this being a TeleHealth encounter (During [facility]-85 public health emergency),

## 2020-06-04 ENCOUNTER — ROUTINE PRENATAL (OUTPATIENT)
Dept: OBGYN | Age: 25
End: 2020-06-04

## 2020-06-04 ENCOUNTER — HOSPITAL ENCOUNTER (OUTPATIENT)
Dept: ULTRASOUND IMAGING | Age: 25
Discharge: HOME | End: 2020-06-04

## 2020-06-04 VITALS
DIASTOLIC BLOOD PRESSURE: 73 MMHG | HEART RATE: 70 BPM | SYSTOLIC BLOOD PRESSURE: 105 MMHG | BODY MASS INDEX: 25.66 KG/M2 | WEIGHT: 159 LBS

## 2020-06-04 DIAGNOSIS — E34.9 HORMONE DISTURBANCE: ICD-10-CM

## 2020-06-04 DIAGNOSIS — N91.2 AMENORRHEA: ICD-10-CM

## 2020-06-04 LAB
ABO/RH: NORMAL
ANTIBODY IDENTIFICATION: NORMAL
ANTIBODY SCREEN: NORMAL
ESTRADIOL LEVEL: 256.4 PG/ML
FOLLICLE STIMULATING HORMONE: <0.1 MIU/ML
GONADOTROPIN, CHORIONIC (HCG) QUANT: ABNORMAL MIU/ML (ref 0–5.3)
LUTEINIZING HORMONE: <0.1 MIU/ML
PROGESTERONE LEVEL: 15.74 NG/ML
TESTOSTERONE TOTAL: 7.7 NG/DL (ref 8.4–48.1)
TSH SERPL DL<=0.05 MIU/L-ACNC: 2.09 UIU/ML (ref 0.27–4.2)

## 2020-06-04 PROCEDURE — 76817 TRANSVAGINAL US OBSTETRIC: CPT

## 2020-06-04 PROCEDURE — 99213 OFFICE O/P EST LOW 20 MIN: CPT | Performed by: OBSTETRICS & GYNECOLOGY

## 2020-06-05 RX ORDER — FERROUS SULFATE 325(65) MG
325 TABLET ORAL 2 TIMES DAILY
Qty: 60 TABLET | Refills: 5 | Status: SHIPPED | OUTPATIENT
Start: 2020-06-05 | End: 2022-09-26

## 2020-06-05 RX ORDER — DOCUSATE SODIUM 100 MG/1
100 CAPSULE, LIQUID FILLED ORAL 3 TIMES DAILY PRN
Qty: 90 CAPSULE | Refills: 3 | Status: SHIPPED | OUTPATIENT
Start: 2020-06-05 | End: 2022-09-26

## 2020-06-06 LAB — VITAMIN D 25-HYDROXY: 13.9 NG/ML

## 2020-06-08 LAB
BASOPHILS ABSOLUTE: 0 K/UL (ref 0–0.2)
BASOPHILS RELATIVE PERCENT: 1 % (ref 0–1)
CHLAMYDIA TRACHOMATIS AMPLIFIED DET: NEGATIVE
EOSINOPHILS ABSOLUTE: 0 K/UL (ref 0–0.6)
EOSINOPHILS RELATIVE PERCENT: 0.5 % (ref 0–5)
HCT VFR BLD CALC: 31.6 % (ref 37–47)
HEMOGLOBIN A-1 QUANTITATION: 97.1 % (ref 95–97.9)
HEMOGLOBIN A2 QUANTITATION: 2.6 % (ref 2–3.5)
HEMOGLOBIN C QUANTITATION: 0 % (ref 0–0)
HEMOGLOBIN E QUANTITATION: 0 % (ref 0–0)
HEMOGLOBIN ELECTROPHORESIS: NORMAL
HEMOGLOBIN EVALUATION: NORMAL
HEMOGLOBIN F QUANTITATION: 0.3 % (ref 0–2.1)
HEMOGLOBIN OTHER: 0 % (ref 0–0)
HEMOGLOBIN S QUANTITATION: 0 % (ref 0–0)
HEMOGLOBIN: 9.9 G/DL (ref 12–16)
HEPATITIS B SURFACE ANTIGEN INTERPRETATION: ABNORMAL
HIV-1 P24 AG: ABNORMAL
IMMATURE GRANULOCYTES #: 0 K/UL
LYMPHOCYTES ABSOLUTE: 1.6 K/UL (ref 1.1–4.5)
LYMPHOCYTES RELATIVE PERCENT: 37.9 % (ref 20–40)
MCH RBC QN AUTO: 24.6 PG (ref 27–31)
MCHC RBC AUTO-ENTMCNC: 31.3 G/DL (ref 33–37)
MCV RBC AUTO: 78.6 FL (ref 81–99)
MONOCYTES ABSOLUTE: 0.3 K/UL (ref 0–0.9)
MONOCYTES RELATIVE PERCENT: 6.6 % (ref 0–10)
N GONORRHOEAE AMPLIFIED DET: NEGATIVE
NEUTROPHILS ABSOLUTE: 2.2 K/UL (ref 1.5–7.5)
NEUTROPHILS RELATIVE PERCENT: 53.8 % (ref 50–65)
PDW BLD-RTO: 14.7 % (ref 11.5–14.5)
PLATELET # BLD: 224 K/UL (ref 130–400)
PMV BLD AUTO: 11.5 FL (ref 9.4–12.3)
RAPID HIV 1&2: ABNORMAL
RBC # BLD: 4.02 M/UL (ref 4.2–5.4)
RPR: ABNORMAL
RUBELLA ANTIBODY IGG: REACTIVE
SICKLE CELL: NORMAL
SPECIMEN SOURCE: NORMAL
WBC # BLD: 4.1 K/UL (ref 4.8–10.8)

## 2020-06-11 LAB — VZV IGG SER QL IA: 1.95

## 2020-06-22 ENCOUNTER — TELEPHONE (OUTPATIENT)
Dept: OBGYN | Age: 25
End: 2020-06-22

## 2020-06-22 ENCOUNTER — HOSPITAL ENCOUNTER (OUTPATIENT)
Dept: ULTRASOUND IMAGING | Age: 25
Discharge: HOME | End: 2020-06-22

## 2020-06-22 DIAGNOSIS — O20.9 BLEEDING IN EARLY PREGNANCY: ICD-10-CM

## 2020-06-22 LAB — GONADOTROPIN, CHORIONIC (HCG) QUANT: ABNORMAL MIU/ML (ref 0–5.3)

## 2020-06-22 PROCEDURE — 76815 OB US LIMITED FETUS(S): CPT

## 2020-06-22 NOTE — TELEPHONE ENCOUNTER
LM re vaginal bleeding. LM recommending an ob ultrasound and HCG for bleeding. Will send my chart message.

## 2020-07-05 PROCEDURE — U0003 INFECTIOUS AGENT DETECTION BY NUCLEIC ACID (DNA OR RNA); SEVERE ACUTE RESPIRATORY SYNDROME CORONAVIRUS 2 (SARS-COV-2) (CORONAVIRUS DISEASE [COVID-19]), AMPLIFIED PROBE TECHNIQUE, MAKING USE OF HIGH THROUGHPUT TECHNOLOGIES AS DESCRIBED BY CMS-2020-01-R: HCPCS | Performed by: FAMILY MEDICINE

## 2020-07-06 ENCOUNTER — TELEMEDICINE (OUTPATIENT)
Dept: OBGYN | Age: 25
End: 2020-07-06

## 2020-07-06 PROCEDURE — 99213 OFFICE O/P EST LOW 20 MIN: CPT | Performed by: OBSTETRICS & GYNECOLOGY

## 2020-07-06 RX ORDER — PROMETHAZINE HYDROCHLORIDE 25 MG/1
25 TABLET ORAL EVERY 6 HOURS PRN
Qty: 60 TABLET | Refills: 0 | Status: SHIPPED | OUTPATIENT
Start: 2020-07-06 | End: 2020-12-28

## 2020-07-06 RX ORDER — DOXYLAMINE SUCCINATE AND PYRIDOXINE HYDROCHLORIDE, DELAYED RELEASE TABLETS 10 MG/10 MG 10; 10 MG/1; MG/1
TABLET, DELAYED RELEASE ORAL
Qty: 60 TABLET | Refills: 3 | Status: SHIPPED | OUTPATIENT
Start: 2020-07-06 | End: 2021-05-25 | Stop reason: ALTCHOICE

## 2020-07-06 NOTE — PROGRESS NOTES
2020    TELEHEALTH EVALUATION -- Audio/Visual (During GMJDB-98 public health emergency)   Jacob Fletcher is a 21 y.o. female 10w6d who presents for routine prenatal visit. She c/o N/V. Has not been able to keep much down today. Reports that she \"passed out\" earlier after walking outside with kids. Has not had anything to stay down since yesterday evening and has vomited multiple times since. Denies vaginal bleeding or cramping. No chest pain or shortness of breath. [id number]  Jacob Fletcher is a 21 y.o. female with the following history as recorded in Epic[employer]:  Patient Active Problem List    Diagnosis Date Noted    Incomplete       Current Outpatient Medications   Medication Sig Dispense Refill    doxylamine-pyridoxine 10-10 MG TBEC Take 2 tabs PO qhs. If symptoms persists after 2 days increase to 1 tab PO qam and 2 tabs PO qhs 60 tablet 3    promethazine (PHENERGAN) 25 MG tablet Take 1 tablet by mouth every 6 hours as needed for Nausea 60 tablet 0    ferrous sulfate (IRON 325) 325 (65 Fe) MG tablet Take 1 tablet by mouth 2 times daily 60 tablet 5    docusate sodium (COLACE) 100 MG capsule Take 1 capsule by mouth 3 times daily as needed for Constipation 90 capsule 3    ibuprofen (ADVIL;MOTRIN) 800 MG tablet Take 1 tablet by mouth every 8 hours 120 tablet 3    Prenatal MV-Min-Fe Fum-FA-DHA (PRENATAL 1 PO) Take by mouth       No current facility-administered medications for this visit. Allergies: Patient has no known allergies.   Past Medical History:   Diagnosis Date    Anemia     Miscarriage      Past Surgical History:   Procedure Laterality Date     SECTION N/A 2017     SECTION performed [facility] Fabiano Elizabeth [facility] at [facility] L[facility]D [facility]    DILATION AND CURETTAGE OF UTERUS N/A 2020    DILATATION AND CURETTAGE SUCTION performed by Billie Edge MD at [address] [place] [address]      as an infant    RI REPAIR ENTEROCELE, W Beti Espinal N/A 2018    VAGINAL CANAL mentioned above. A caregiver was present when appropriate. Due to this being a TeleHealth encounter (During [facility]-[address] [place] [address] emergency), evaluation of the following organ systems was limited: Vitals/Constitutional/EENT/Resp/CV/GI//MS/Neuro/Skin/Heme-Lymph-Imm. Pursuant to the emergency declaration under the [address] [place] [address], [address] [place] [address] and the [employer] and [employer] Act, this Virtual Visit was conducted with patient's (and/or legal guardian's) consent, to reduce the patient's risk of exposure to COVID-19 and provide necessary medical care. The patient (and/or legal guardian) has also been advised to contact this office for worsening conditions or problems, and seek emergency medical treatment and/or call 911 if deemed necessary. Services were provided through a video synchronous discussion virtually to substitute for in-person clinic visit. Patient and provider were located at their individual homes. --Gretchen Escalante MD on 7/6/2020 at 4:54 PM    An electronic signature was used to authenticate this note.

## 2020-07-08 ENCOUNTER — TELEPHONE (OUTPATIENT)
Dept: URGENT CARE | Facility: CLINIC | Age: 25
End: 2020-07-08

## 2020-07-08 NOTE — TELEPHONE ENCOUNTER
COVID-19 Test Result   Telephone Encounter    Patient Name: Siri Gallardo   : 1995   MRN: [id number]     SARS-CoV-2, YONATHAN   Date Value Ref Range Status   2020 Not Detected Not Detected Final     Comment:     This test was developed and its performance characteristics determined  by [employer]. This test has not been FDA cleared or  approved. This test has been authorized by FDA under an Emergency Use  Authorization (EUA). This test is only authorized for the duration of  time the declaration that circumstances exist justifying the  authorization of the emergency use of in vitro diagnostic tests for  detection of SARS-CoV-2 virus and/or diagnosis of COVID-19 infection  under section 564(b)(1) of the Act, 21 U.S.C. 360bbb-3(b)(1), unless  the authorization is terminated or revoked sooner.  When diagnostic testing is negative, the possibility of a false  negative result should be considered in the context of a patient's  recent exposures and the presence of clinical signs and symptoms  consistent with COVID-19. An individual without symptoms of COVID-19  and who is not shedding SARS-CoV-2 virus would expect to have a  negative (not detected) result in this assay.        Patient was counseled as follows:  • (-) negative COVID-19 test result with or without symptoms   • The test is not perfect, so there is a chance it could be falsely negative or the virus level is too low for detection due to being very early in the infectious process.   • The optimal duration of home isolation is uncertain. The [place] Centers for Disease Control and Prevention (CDC) has issued recommendations on discontinuation of home isolation.   • For this reason, Siri is strongly encouraged to practice the safest standards in protecting their health and others given the current pandemic concerns. She is advised to:   o Practice social distancing in the community by staying at least 6 feet away from people   o Encouraged  to use face mask while out in public   o Continue to wash their hands frequently with soap and hot water, and cover their mouth while coughing.   • If Siri is asymptomatic, she should self isolate for a total of 14 days from time of potential contact with Covid-19.   • If Siri is symptomatic then she may discontinue home isolation when the following criteria are met:   o At least seven days have passed since symptoms first appeared AND   o At least three days (72 hours) have passed since recovery of symptoms (defined as resolution of fever without the use of fever-reducing medications and improvement in respiratory symptoms [e.g., cough, shortness of breath])   • If Siri has been asymptomatic but then develops non-emergent symptoms such as mild increased shortness of breath, fever, cough, or for other questions, she  was asked to please call their primary care physician’s office or the [place] hotline at [phone].   • Patient is feeling improved at time of telephone encounter.  · Questions were engaged and answered to the best of my ability. She         expressed verbal understanding of their test results and my advice.    Primary Care Physician verified as being: Provider, No Known      Electronically signed by CORA Aquino, 07/08/20, 9:47 AM.

## 2020-07-27 ENCOUNTER — TELEMEDICINE (OUTPATIENT)
Dept: OBGYN | Age: 25
End: 2020-07-27

## 2020-07-27 PROCEDURE — 99213 OFFICE O/P EST LOW 20 MIN: CPT | Performed by: OBSTETRICS & GYNECOLOGY

## 2020-07-27 NOTE — PROGRESS NOTES
2020    TELEHEALTH EVALUATION -- Audio/Visual (During VEFLY-97 public health emergency)   Suhail Lozano is a 31 y.o. female 13w6d who presents for routine prenatal visit. The patient was seen and evaluated. There was negative fetal movements. No contractions, bleeding or leakage of fluid. Signs and symptoms of  labor as well as labor were reviewed. The S/S of Pre-Eclampsia were reviewed with the patient in detail. She is to report any of these if they occur. She currently denies any of these. Pt's weight today is 157 lbs  Pt feels like she may have had Covid, was tested, but came back negative. [id number]  Suhail Lozano is a 31 y.o. female [facility] the [facility]  Patient Active Problem List    Diagnosis Date Noted    Incomplete       Current Outpatient Medications   Medication Sig Dispense Refill    doxylamine-pyridoxine 10-10 MG TBEC Take 2 tabs PO qhs. If symptoms persists after 2 days increase to 1 tab PO qam and 2 tabs PO qhs 60 tablet 3    promethazine (PHENERGAN) 25 MG tablet Take 1 tablet by mouth every 6 hours as needed for Nausea 60 tablet 0    ferrous sulfate (IRON 325) 325 (65 Fe) MG tablet Take 1 tablet by mouth 2 times daily 60 tablet 5    docusate sodium (COLACE) 100 MG capsule Take 1 capsule by mouth 3 times daily as needed for Constipation 90 capsule 3    Prenatal MV-Min-Fe Fum-FA-DHA (PRENATAL 1 PO) Take by mouth       No current facility-administered medications for this visit. Allergies: Patient has no known allergies.   Past Medical History:   Diagnosis Date    Anemia     Miscarriage      Past Surgical History:   Procedure Laterality Date     SECTION N/A 2017     SECTION performed by [facility]islinn Cole [facility] at [facility]D OR    DILATION AND CURETTAGE OF UTERUS N/A 2020    DILATATION AND CURETTAGE SUCTION performed by Teena Deleon MD at [address] [place] [address]      as an infant    CA REPAIR ENTEROCELE,V[facility] N/A 8/21/2018    VAGINAL CANAL REPAIR performed by Brice Newell MD at [address]     No family history on file. Social History     Tobacco Use    Smoking status: Never Smoker    Smokeless tobacco: Never Used   Substance Use Topics    Alcohol use: No     Alcohol/week: 0.0 standard drinks            Physical Exam  Constitutional:       General: She is not in acute distress. Appearance: Normal appearance. She is not ill-appearing, toxic-appearing or diaphoretic. HENT:      Head: Normocephalic and atraumatic. Nose: No rhinorrhea. Eyes:      General: No scleral icterus. Right eye: No discharge. Left eye: No discharge. Extraocular Movements: Extraocular movements intact. Pulmonary:      Effort: Pulmonary effort is normal. No respiratory distress. Musculoskeletal: Normal range of motion. Skin:     General: Skin is dry. Findings: No rash. Neurological:      Mental Status: She is alert and oriented to person, place, and time. Psychiatric:         Attention and Perception: Attention and perception normal.         Mood and Affect: Mood and affect normal.         Speech: Speech normal.         Behavior: Behavior normal. Behavior is cooperative. Thought Content: Thought content normal.         Cognition and Memory: Cognition and memory normal.         Judgment: Judgment normal.             Assessment:   Diagnosis Orders   1. Encounter for supervision of normal pregnancy in multigravida in second trimester     2. 13 weeks gestation of pregnancy     3. Low weight gain during pregnancy in second trimester     4. Fatigue associated with anemia  CBC Auto Differential   5. Encounter to establish care with new doctor  Estefani Ellis MD, Family Medicine, [place]             Plan:  1. SAB precautions   2. Return in 4 weeks  3. Repeat labs ordered  4. Referral to Dr. Dean Pak for primary care.    Gogo Lane am scribing for and in the presence of Dr. Nancie Muro. I, Dr. Nancie Muro, personally performed the services described in this documentation as scribed by Juan Jose Jaramillo in my presence, and it is both accurate and complete. Ebony Cummins is a 31 y.o. female being evaluated by a Virtual Visit (video visit) encounter to address concerns as mentioned above. A caregiver was present when appropriate. Due to this being a TeleHealth encounter (During ITQ-41 public health emergency), evaluation of the following organ systems was limited: Vitals/Constitutional/EENT/Resp/CV/GI//MS/Neuro/Skin/Heme-Lymph-Imm. Pursuant to the emergency declaration under the [address] [place] [address] and the [employer] and [employer] Act, this Virtual Visit was conducted with patient's (and/or legal guardian's) consent, to reduce the patient's risk of exposure to COVID-19 and provide necessary medical care. The patient (and/or legal guardian) has also been advised to contact this office for worsening conditions or problems, and seek emergency medical treatment and/or call 911 if deemed necessary. Services were provided through a video synchronous discussion virtually to substitute for in-person clinic visit. Patient and provider were located at their individual homes. --Rosangela Stern MD on 7/31/2020 at 3:18 PM    An electronic signature was used to authenticate this note. [id number]

## 2020-07-27 NOTE — PATIENT INSTRUCTIONS
Patient Education        Weeks 10 to 14 of Your Pregnancy: Care Instructions  Your Care Instructions     By weeks 10 to 14 of your pregnancy, the placenta has formed inside your uterus. It is possible to hear your baby's heartbeat with a special ultrasound device. Your baby's eyes can and do move. The arms and legs can bend. This is a good time to think about testing for birth defects. There are two types of tests: screening and diagnostic. Screening tests show the chance that a baby has a certain birth defect. They can't tell you for sure that your baby has a problem. Diagnostic tests show if a baby has a certain birth defect. It's your choice whether to have these tests. You and your partner can talk to your doctor or midwife about birth defects tests. Follow-up care is a key part of your treatment and safety. Be sure to make and go to all appointments, and call your doctor if you are having problems. It's also a good idea to know your test results and keep a list of the medicines you take. How can you care for yourself at home? Decide about tests  · You can have screening tests and diagnostic tests to check for birth defects. The decision to have a test for birth defects is personal. Think about your age, your chance of passing on a family disease, your need to know about any problems, and what you might do after you have the test results. ? Triple or quadruple (quad) blood tests. These screening tests can be done between 15 and 20 weeks of pregnancy. They check the amounts of three or four substances in your blood. The doctor looks at these test results, along with your age and other factors, to find out the chance that your baby may have certain problems. ? Amniocentesis. This diagnostic test is used to look for chromosomal problems in the baby's cells.  It can be done between 15 and 20 weeks of pregnancy, usually around week 16.  ? Nuchal translucency test. This test uses ultrasound to measure the thickness of the area at the back of the baby's neck. An increase in the thickness can be an early sign of Down syndrome. ? Chorionic villus sampling (CVS). This is a test that looks for certain genetic problems with your baby. The same genes that are in your baby are in the placenta. A small piece of the placenta is taken out and tested. This test is done when you are 10 to 13 weeks pregnant. Ease discomfort  · Slow down and take naps when you feel tired. · If your emotions swing, talk to someone. Crying, anxiety, and concentration problems are common. · If your gums bleed, try a softer toothbrush. If your gums are puffy and bleed a lot, see your dentist.  · If you feel dizzy:  ? Get up slowly after sitting or lying down. ? Drink plenty of fluids. ? Eat small snacks to keep your blood sugar stable. ? Put your head between your legs as though you were tying your shoelaces. ? Lie down with your legs higher than your head. Use pillows to prop up your feet. · If you have a headache:  ? Lie down. ? Ask your partner or a good friend for a neck massage. ? Try cool cloths over your forehead or across the back of your neck. ? Use acetaminophen (Tylenol) for pain relief. Do not use nonsteroidal anti-inflammatory drugs (NSAIDs), such as ibuprofen (Advil, Motrin) or naproxen (Aleve), unless your doctor says it is okay. · If you have a nosebleed, pinch your nose gently, and hold it for a short while. To prevent nosebleeds, try massaging a small dab of petroleum jelly, such as Vaseline, in your nostrils. · If your nose is stuffed up, try saline (saltwater) nose sprays. Do not use decongestant sprays. Care for your breasts  · Wear a bra that gives you good support. · Know that changes in your breasts are normal.  ? Your breasts may get larger and more tender. Tenderness usually gets better by 12 weeks. ? Your nipples may get darker and larger, and small bumps around your nipples may show more. ?  The veins in your chest and breasts may show more. · Don't worry about \"toughening'\" your nipples. Breastfeeding will naturally do this. Where can you learn more? Go to https://iwocapeestrellitaeweb.healthMocoplex. org and sign in to your [employer] account. Enter Z847 in the [employer] box to learn more about \"Weeks 10 to 14 of Your Pregnancy: Care Instructions. \"     If you do not have an account, please click on the \"Sign Up Now\" link. Current as of: February 11, 2020               Content Version: 12.5  © 3086-4299 [employer]wise, Incorporated. Care instructions adapted under license by [facility] ([facility]). If you have questions about a medical condition or this instruction, always ask your healthcare professional. [address] any warranty or liability for your use of this information.

## 2020-08-10 ENCOUNTER — TELEMEDICINE (OUTPATIENT)
Dept: OBGYN | Age: 25
End: 2020-08-10

## 2020-08-10 PROCEDURE — 99213 OFFICE O/P EST LOW 20 MIN: CPT | Performed by: OBSTETRICS & GYNECOLOGY

## 2020-08-10 NOTE — PROGRESS NOTES
8/10/2020    TELEHEALTH EVALUATION -- Audio/Visual (During DYEFK-60 public health emergency)   Kendell Pina is a 31 y.o. female 15w6d who presents for routine prenatal visit. The patient was seen and evaluated. There was negative fetal movements. No contractions, bleeding or leakage of fluid. Signs and symptoms of  labor as well as labor were reviewed. The S/S of Pre-Eclampsia were reviewed with the patient in detail. She is to report any of these if they occur. She currently denies any of these. Pt weighs 161 lbs today, states she is still nauseous and fatigued, but getting better. [id number]  Kendell Pina is a 31 y.o. female [facility] the [facility]  Patient Active Problem List    Diagnosis Date Noted    Incomplete       Current Outpatient Medications   Medication Sig Dispense Refill    doxylamine-pyridoxine 10-10 MG TBEC Take 2 tabs PO qhs. If symptoms persists after 2 days increase to 1 tab PO qam and 2 tabs PO qhs 60 tablet 3    promethazine (PHENERGAN) 25 MG tablet Take 1 tablet by mouth every 6 hours as needed for Nausea 60 tablet 0    ferrous sulfate (IRON 325) 325 (65 Fe) MG tablet Take 1 tablet by mouth 2 times daily 60 tablet 5    docusate sodium (COLACE) 100 MG capsule Take 1 capsule by mouth 3 times daily as needed for Constipation 90 capsule 3    Prenatal MV-Min-Fe Fum-FA-DHA (PRENATAL 1 PO) Take by mouth       No current facility-administered medications for this visit. Allergies: Patient has no known allergies.   Past Medical History:   Diagnosis Date    Anemia     Miscarriage      Past Surgical History:   Procedure Laterality Date     SECTION N/A 2017     SECTION performed by Katya Forrest MD at [address] L&D OR    DILATION AND CURETTAGE OF UTERUS N/A 2020    DILATATION AND CURETTAGE SUCTION performed by Israel Rothman MD at [facility]      as an infant    [place] REPAIR ENTEROCELE, [address] N/A 8/21/2018    VAGINAL CANAL REPAIR performed by Ralph Valencia MD at [address]     History reviewed. No pertinent family history. Social History     Tobacco Use    Smoking status: Never Smoker    Smokeless tobacco: Never Used   Substance Use Topics    Alcohol use: No     Alcohol/week: 0.0 standard drinks            Physical Exam  Constitutional:       General: She is not in acute distress. Appearance: Normal appearance. She is not ill-appearing, toxic-appearing or diaphoretic. HENT:      Head: Normocephalic and atraumatic. Nose: No rhinorrhea. Eyes:      General: No scleral icterus. Right eye: No discharge. Left eye: No discharge. Extraocular Movements: Extraocular movements intact. Pulmonary:      Effort: Pulmonary effort is normal. No respiratory distress. Musculoskeletal: Normal range of motion. Skin:     Findings: No erythema or rash. Neurological:      Mental Status: She is alert and oriented to person, place, and time. Psychiatric:         Attention and Perception: Attention and perception normal.         Mood and Affect: Mood and affect normal.         Speech: Speech normal.         Behavior: Behavior normal. Behavior is cooperative. Thought Content: Thought content normal.         Cognition and Memory: Cognition and memory normal.         Judgment: Judgment normal.             Assessment:   Diagnosis Orders   1. Encounter for supervision of normal pregnancy in multigravida in second trimester     2. 15 weeks gestation of pregnancy               Plan:  1. SAB precautions   2. Return in 4 weeks        I, Dr. Hanna Fletcher, personally performed the services described in this documentation as scribed by Srini Carballo in my presence, and it is both accurate and complete. Stephanie Flores is a 31 y.o. female being evaluated by a Virtual Visit (video visit) encounter to address concerns as mentioned above.   A caregiver was present when appropriate. Due to this being a TeleHealth encounter (During ULYBV-56 public health emergency), evaluation of the following organ systems was limited: Vitals/Constitutional/EENT/Resp/CV/GI//MS/Neuro/Skin/Heme-Lymph-Imm. Pursuant to the emergency declaration under the [address] [place] [address] and the [employer] and [employer] Act, this Virtual Visit was conducted with patient's (and/or legal guardian's) consent, to reduce the patient's risk of exposure to COVID-19 and provide necessary medical care. The patient (and/or legal guardian) has also been advised to contact this office for worsening conditions or problems, and seek emergency medical treatment and/or call 911 if deemed necessary. Services were provided through a video synchronous discussion virtually to substitute for in-person clinic visit. Patient and provider were located at their individual homes. --Carolina Stephens MD on 8/10/2020 at 12:47 PM    An electronic signature was used to authenticate this note.

## 2020-08-17 DIAGNOSIS — D64.9 FATIGUE ASSOCIATED WITH ANEMIA: ICD-10-CM

## 2020-08-17 LAB
BASOPHILS ABSOLUTE: 0 K/UL (ref 0–0.2)
BASOPHILS RELATIVE PERCENT: 0.4 % (ref 0–1)
EOSINOPHILS ABSOLUTE: 0 K/UL (ref 0–0.6)
EOSINOPHILS RELATIVE PERCENT: 0.6 % (ref 0–5)
HCT VFR BLD CALC: 32.5 % (ref 37–47)
HEMOGLOBIN: 10.1 G/DL (ref 12–16)
IMMATURE GRANULOCYTES #: 0 K/UL
LYMPHOCYTES ABSOLUTE: 1.8 K/UL (ref 1.1–4.5)
LYMPHOCYTES RELATIVE PERCENT: 25.3 % (ref 20–40)
MCH RBC QN AUTO: 24.1 PG (ref 27–31)
MCHC RBC AUTO-ENTMCNC: 31.1 G/DL (ref 33–37)
MCV RBC AUTO: 77.6 FL (ref 81–99)
MONOCYTES ABSOLUTE: 0.4 K/UL (ref 0–0.9)
MONOCYTES RELATIVE PERCENT: 6 % (ref 0–10)
NEUTROPHILS ABSOLUTE: 4.8 K/UL (ref 1.5–7.5)
NEUTROPHILS RELATIVE PERCENT: 67.3 % (ref 50–65)
PDW BLD-RTO: 18.6 % (ref 11.5–14.5)
PLATELET # BLD: 187 K/UL (ref 130–400)
PMV BLD AUTO: 10.7 FL (ref 9.4–12.3)
RBC # BLD: 4.19 M/UL (ref 4.2–5.4)
WBC # BLD: 7.2 K/UL (ref 4.8–10.8)

## 2020-09-09 ENCOUNTER — TELEMEDICINE (OUTPATIENT)
Dept: OBGYN | Age: 25
End: 2020-09-09

## 2020-09-09 ENCOUNTER — TELEPHONE (OUTPATIENT)
Dept: OBGYN | Age: 25
End: 2020-09-09

## 2020-09-09 PROCEDURE — 99213 OFFICE O/P EST LOW 20 MIN: CPT | Performed by: OBSTETRICS & GYNECOLOGY

## 2020-09-09 NOTE — PROGRESS NOTES
2020    TELEHEALTH EVALUATION -- Audio/Visual (During NPPGU-54 public health emergency)   Jacob Fletcher is a 31 y.o. female 20w1d who presents for routine prenatal visit. The patient was seen and evaluated. There was normal fetal movements. No contractions, bleeding or leakage of fluid. Signs and symptoms of  labor as well as labor were reviewed. The S/S of Pre-Eclampsia were reviewed with the patient in detail. She is to report any of these if they occur. She currently denies any of these. [id number]  Jacob Fletcher is a 31 y.o. female [facility] the [facility]  Patient Active Problem List    Diagnosis Date Noted    Incomplete       Current Outpatient Medications   Medication Sig Dispense Refill    doxylamine-pyridoxine 10-10 MG TBEC Take 2 tabs PO qhs. If symptoms persists after 2 days increase to 1 tab PO qam and 2 tabs PO qhs 60 tablet 3    promethazine (PHENERGAN) 25 MG tablet Take 1 tablet by mouth every 6 hours as needed for Nausea 60 tablet 0    ferrous sulfate (IRON 325) 325 (65 Fe) MG tablet Take 1 tablet by mouth 2 times daily 60 tablet 5    docusate sodium (COLACE) 100 MG capsule Take 1 capsule by mouth 3 times daily as needed for Constipation 90 capsule 3    Prenatal MV-Min-Fe Fum-FA-DHA (PRENATAL 1 PO) Take by mouth       No current facility-administered medications for this visit. Allergies: Patient has no known allergies.   Past Medical History:   Diagnosis Date    Anemia     Miscarriage      Past Surgical History:   Procedure Laterality Date     SECTION N/A 2017     SECTION performed by Fabiano Elizabeth MD at [facility] L[facility]D OR    DILATION AND CURETTAGE OF UTERUS N/A 2020    DILATATION AND CURETTAGE SUCTION performed by Billie Edge MD at [facility]      as an infant    [place] REPAIR ENTEROCELE, [address] N/A 2018    VAGINAL CANAL REPAIR performed by Billie Edge MD at [facility] History reviewed. No pertinent family history. Social History     Tobacco Use    Smoking status: Never Smoker    Smokeless tobacco: Never Used   Substance Use Topics    Alcohol use: No     Alcohol/week: 0.0 standard drinks            Physical Exam  Constitutional:       General: She is not in acute distress. Appearance: Normal appearance. She is not ill-appearing or diaphoretic. HENT:      Head: Normocephalic and atraumatic. Nose: Nose normal. No rhinorrhea. Eyes:      General: No scleral icterus. Right eye: No discharge. Left eye: No discharge. Extraocular Movements: Extraocular movements intact. Pulmonary:      Effort: Pulmonary effort is normal. No respiratory distress. Musculoskeletal: Normal range of motion. Skin:     Coloration: Skin is not pale. Findings: No erythema or rash. Neurological:      Mental Status: She is alert and oriented to person, place, and time. Psychiatric:         Attention and Perception: Attention and perception normal.         Mood and Affect: Mood and affect normal.         Speech: Speech normal.         Behavior: Behavior normal. Behavior is cooperative. Thought Content: Thought content normal.         Cognition and Memory: Cognition and memory normal.         Judgment: Judgment normal.             Assessment:   Diagnosis Orders   1. Encounter for supervision of other normal pregnancy in second trimester  External Referral To Maternal Fetal Medicine   2. 20 weeks gestation of pregnancy     3. Anemia during pregnancy in second trimester               Plan:  1. SAB precautions   2. Continue iron supplementation  3. Anatomy scan scheduled for tomorrow  4. Return in 4 weeks VV  I Kavon Fletcher, am scribing for and in the presence of Dr. Ebonie Soto. I, Dr. Ebonie Soto, personally performed the services described in this documentation as scribed by Kavon Fletcher in my presence, and it is both accurate and complete. Rosey Valladares is a 31 y.o. female being evaluated by a Virtual Visit (video visit) encounter to address concerns as mentioned above. A caregiver was present when appropriate. Due to this being a TeleHealth encounter (During HADGB-81 public health emergency), evaluation of the following organ systems was limited: Vitals/Constitutional/EENT/Resp/CV/GI//MS/Neuro/Skin/Heme-Lymph-Imm. Pursuant to the emergency declaration under the [address] [place] [address] and the [employer] and [employer] Act, this Virtual Visit was conducted with patient's (and/or legal guardian's) consent, to reduce the patient's risk of exposure to COVID-19 and provide necessary medical care. The patient (and/or legal guardian) has also been advised to contact this office for worsening conditions or problems, and seek emergency medical treatment and/or call 911 if deemed necessary. Services were provided through a video synchronous discussion virtually to substitute for in-person clinic visit. Patient and provider were located at their individual homes. --Ida Bach MD on 9/10/2020 at 4:10 PM    An electronic signature was used to authenticate this note.

## 2020-10-12 ENCOUNTER — TELEMEDICINE (OUTPATIENT)
Dept: OBGYN | Age: 25
End: 2020-10-12

## 2020-10-12 PROCEDURE — 99213 OFFICE O/P EST LOW 20 MIN: CPT | Performed by: OBSTETRICS & GYNECOLOGY

## 2020-10-12 NOTE — PROGRESS NOTES
10/12/2020    TELEHEALTH EVALUATION -- Audio/Visual (During YVLSN-97 public health emergency)   Carmelita Campbell is a 31 y.o. female 24w6d who presents for routine prenatal visit. The patient was seen and evaluated. There was positive fetal movements. No contractions, bleeding or leakage of fluid. Signs and symptoms of  labor as well as labor were reviewed. The S/S of Pre-Eclampsia were reviewed with the patient in detail. She is to report any of these if they occur. She currently denies any of these. [id number]  Carmelita Campbell is a 31 y.o. female with the following history as [facility] in [facility]:  Patient Active Problem List    Diagnosis Date Noted    Incomplete       Current Outpatient Medications   Medication Sig Dispense Refill    doxylamine-pyridoxine 10-10 MG TBEC Take 2 tabs PO qhs. If symptoms persists after 2 days increase to 1 tab PO qam and 2 tabs PO qhs 60 tablet 3    promethazine (PHENERGAN) 25 MG tablet Take 1 tablet by mouth every 6 hours as needed for Nausea 60 tablet 0    ferrous sulfate (IRON 325) 325 (65 Fe) MG tablet Take 1 tablet by mouth 2 times daily 60 tablet 5    docusate sodium (COLACE) 100 MG capsule Take 1 capsule by mouth 3 times daily as needed for Constipation 90 capsule 3    Prenatal MV-Min-Fe Fum-FA-DHA (PRENATAL 1 PO) Take by mouth       No current facility-administered medications for this visit. Allergies: Patient has no known allergies.   Past Medical History:   Diagnosis Date    Anemia     Miscarriage      Past Surgical History:   Procedure Laterality Date     SECTION N/A 2017     SECTION performed by Jeffry Cheng MD at [facility] L[facility]D OR    DILATION AND CURETTAGE OF UTERUS N/A 2020    DILATATION AND CURETTAGE SUCTION performed by Errol Hawkins MD at [facility]      as an infant    NE REPAIR ENTEROCELE, [address] N/A 2018    VAGINAL CANAL REPAIR performed by Errol Hawkins MD at [address] [place] [address] No family history on file. Social History     Tobacco Use    Smoking status: Never Smoker    Smokeless tobacco: Never Used   Substance Use Topics    Alcohol use: No     Alcohol/week: 0.0 standard drinks            Physical Exam  Constitutional:       General: She is not in acute distress. Appearance: Normal appearance. She is not ill-appearing or diaphoretic. HENT:      Head: Normocephalic and atraumatic. Nose: Nose normal. No rhinorrhea. Eyes:      General: No scleral icterus. Right eye: No discharge. Left eye: No discharge. Extraocular Movements: Extraocular movements intact. Pulmonary:      Effort: Pulmonary effort is normal. No respiratory distress. Musculoskeletal: Normal range of motion. Skin:     Coloration: Skin is not pale. Findings: No erythema or rash. Neurological:      Mental Status: She is alert and oriented to person, place, and time. Psychiatric:         Attention and Perception: Attention and perception normal.         Mood and Affect: Mood and affect normal.         Speech: Speech normal.         Behavior: Behavior normal. Behavior is cooperative. Thought Content: Thought content normal.         Cognition and Memory: Cognition and memory normal.         Judgment: Judgment normal.           The patient had her 28 week labs ordered. T-Dap Vaccine (27-36 weeks): awaiting    The patient was instructed on fetal kick counts and was given a kick sheet to complete every 8 hours. She was instructed that the baby should move at a minimum of ten times within one hour after a meal. The patient was instructed to lay down on her left side twenty minutes after eating and count movements for up to one hour with a target value of ten movements. She was instructed to notify the office if she did not make that target after two attempts or if after any attempt there was less than four movements.     The patient reports that the targets have been made

## 2020-11-03 ENCOUNTER — ROUTINE PRENATAL (OUTPATIENT)
Dept: OBGYN | Age: 25
End: 2020-11-03

## 2020-11-03 ENCOUNTER — TELEPHONE (OUTPATIENT)
Dept: OBGYN | Age: 25
End: 2020-11-03

## 2020-11-03 VITALS
HEART RATE: 80 BPM | BODY MASS INDEX: 30.83 KG/M2 | DIASTOLIC BLOOD PRESSURE: 69 MMHG | WEIGHT: 191 LBS | SYSTOLIC BLOOD PRESSURE: 111 MMHG

## 2020-11-03 DIAGNOSIS — Z34.82 ENCOUNTER FOR SUPERVISION OF OTHER NORMAL PREGNANCY IN SECOND TRIMESTER: ICD-10-CM

## 2020-11-03 DIAGNOSIS — Z67.91 RH NEGATIVE STATE IN ANTEPARTUM PERIOD: ICD-10-CM

## 2020-11-03 DIAGNOSIS — O26.899 RH NEGATIVE STATE IN ANTEPARTUM PERIOD: ICD-10-CM

## 2020-11-03 LAB
ABO/RH: NORMAL
ANTIBODY SCREEN: NORMAL
BASOPHILS ABSOLUTE: 0 K/UL (ref 0–0.2)
BASOPHILS RELATIVE PERCENT: 0.5 % (ref 0–1)
EOSINOPHILS ABSOLUTE: 0.1 K/UL (ref 0–0.6)
EOSINOPHILS RELATIVE PERCENT: 0.8 % (ref 0–5)
GLUCOSE, 1HR PP: 95 MG/DL (ref 75–140)
HCT VFR BLD CALC: 28.4 % (ref 37–47)
HEMOGLOBIN: 8.7 G/DL (ref 12–16)
IMMATURE GRANULOCYTES #: 0.1 K/UL
LYMPHOCYTES ABSOLUTE: 1.5 K/UL (ref 1.1–4.5)
LYMPHOCYTES RELATIVE PERCENT: 19.7 % (ref 20–40)
MCH RBC QN AUTO: 25 PG (ref 27–31)
MCHC RBC AUTO-ENTMCNC: 30.6 G/DL (ref 33–37)
MCV RBC AUTO: 81.6 FL (ref 81–99)
MONOCYTES ABSOLUTE: 0.4 K/UL (ref 0–0.9)
MONOCYTES RELATIVE PERCENT: 5 % (ref 0–10)
NEUTROPHILS ABSOLUTE: 5.7 K/UL (ref 1.5–7.5)
NEUTROPHILS RELATIVE PERCENT: 72.8 % (ref 50–65)
PDW BLD-RTO: 14.6 % (ref 11.5–14.5)
PLATELET # BLD: 183 K/UL (ref 130–400)
PMV BLD AUTO: 10.9 FL (ref 9.4–12.3)
RBC # BLD: 3.48 M/UL (ref 4.2–5.4)
RHIG LOT NUMBER: NORMAL
WBC # BLD: 7.8 K/UL (ref 4.8–10.8)

## 2020-11-03 PROCEDURE — 96372 THER/PROPH/DIAG INJ SC/IM: CPT | Performed by: NURSE PRACTITIONER

## 2020-11-03 PROCEDURE — 99213 OFFICE O/P EST LOW 20 MIN: CPT | Performed by: NURSE PRACTITIONER

## 2020-11-03 NOTE — PROGRESS NOTES
Patient presents today for routine prenatal visit. Pt denies any vaginal leaking bleeding or contractions. + Fetal movement. 3D US today  No TDAP  Or Flu shot- per patient request  Ann Marie Hernández is here for a return obstetrical visit. Today she is 28w0d weeks EGA. She is doing well and has no complaints. Will get Rhogam today. She  does not have vaginal bleeding, leaking of fluid, contractions. She does not have blurred vision, SOB, or increased swelling in legs or face. Pt does feel fetal movement regularly. O:   Vitals:    20 1404   BP: 111/69   Pulse: 80   Weight: 191 lb (86.6 kg)     Pt is A&Ox3, in no acute distress. Normocephalic, atraumatic. PERRL. Resp even and non-labored. Skin pink, warm & dry. Gravid abdomen. ERAZO's well. Gait steady. See OB flowsheet. A: Normal IUP at 28w0d wks      Diagnosis Orders   1. Encounter for supervision of other normal pregnancy in third trimester     2. Rh negative state in antepartum period  rho(D) immune globulin (HYPERRHO S/D) injection 300 mcg   3. 28 weeks gestation of pregnancy  rho(D) immune globulin (HYPERRHO S/D) injection 300 mcg       P:   Pt counseled on PIH precautions, Kick count and  labor  Continue with routine prenatal care. RTC in 2 wk for prenatal visit-vv    MEDICATIONS:  Orders Placed This Encounter   Medications    rho(D) immune globulin (HYPERRHO S/D) injection 300 mcg       ORDERS:  No orders of the defined types were placed in this encounter.

## 2020-11-03 NOTE — TELEPHONE ENCOUNTER
I called pt and left a vm to let her know that her Rhogam injection is ready. We will be out of the office soon. She can come in tomorrow to get the injection.

## 2020-11-03 NOTE — PATIENT INSTRUCTIONS
sometimes follow orgasm. Learn about  labor  · Watch for signs of  labor. You may be going into labor if:  ? You have menstrual-like cramps, with or without nausea. ? You have about 6 or more contractions in 1 hour, even after you have had a glass of water and are resting. ? You have a low, dull backache that does not go away when you change your position. ? You have pain or pressure in your pelvis that comes and goes in a pattern. ? You have intestinal cramping or flu-like symptoms, with or without diarrhea.  ? You notice an increase or change in your vaginal discharge. Discharge may be heavy, mucus-like, watery, or streaked with blood. ? Your water breaks. · If you think you have  labor:  ? Drink 2 or 3 glasses of water or juice. Not drinking enough fluids can cause contractions. ? Stop what you are doing, and empty your bladder. Then lie down on your left side for at least 1 hour. ? While lying on your side, find your breast bone. Put your fingers in the soft spot just below it. Move your fingers down toward your belly button to find the top of your uterus. Check to see if it is tight. ? Contractions can be weak or strong. Record your contractions for an hour. Time a contraction from the start of one contraction to the start of the next one.  ? Single or several strong contractions without a pattern are called Kingsland-Carnes contractions. They are practice contractions but not the start of labor. They often stop if you change what you are doing. ? Call your doctor if you have regular contractions. Where can you learn more? Go to [email or web address] and sign in to your [employer] account. Enter L919 in the [employer] box to learn more about \"Weeks 26 to 30 of Your Pregnancy: Care Instructions. \"     If you do not have an account, please click on the \"Sign Up Now\" link.   Current as of: 2020               Content Version: 12.6  © 0230-8962 Healthwise, Incorporated. Care instructions adapted under license by [employer]. If you have questions about a medical condition or this instruction, always ask your healthcare professional. [address] any warranty or liability for your use of this information.

## 2020-11-19 ENCOUNTER — TELEMEDICINE (OUTPATIENT)
Dept: OBGYN | Age: 25
End: 2020-11-19

## 2020-11-19 PROCEDURE — 99213 OFFICE O/P EST LOW 20 MIN: CPT | Performed by: OBSTETRICS & GYNECOLOGY

## 2020-11-19 NOTE — PROGRESS NOTES
2020    TELEHEALTH EVALUATION -- Audio/Visual (During BZGVN-28 public health emergency)   Fiona Costa is a 31 y.o. female 30w2d who presents for routine prenatal visit. The patient was seen and evaluated. There was normal fetal movements. No contractions, bleeding or leakage of fluid. Signs and symptoms of  labor as well as labor were reviewed. The S/S of Pre-Eclampsia were reviewed with the patient in detail. She is to report any of these if they occur. She currently denies any of these. C/o fatigue      Fiona Costa is a 31 y.o. female with the [facility]  Patient Active Problem List    Diagnosis Date Noted    Incomplete       Current Outpatient Medications   Medication Sig Dispense Refill    doxylamine-pyridoxine 10-10 MG TBEC Take 2 tabs PO qhs. If symptoms persists after 2 days increase to 1 tab PO qam and 2 tabs PO qhs 60 tablet 3    promethazine (PHENERGAN) 25 MG tablet Take 1 tablet by mouth every 6 hours as needed for Nausea 60 tablet 0    ferrous sulfate (IRON 325) 325 (65 Fe) MG tablet Take 1 tablet by mouth 2 times daily 60 tablet 5    docusate sodium (COLACE) 100 MG capsule Take 1 capsule by mouth 3 times daily as needed for Constipation 90 capsule 3    Prenatal MV-Min-Fe Fum-FA-DHA (PRENATAL 1 PO) Take by mouth       No current facility-administered medications for this visit. Allergies: Patient has no known allergies.   Past Medical History:   Diagnosis Date    Anemia     Miscarriage      Past Surgical History:   Procedure Laterality Date     SECTION N/A 2017     SECTION performed by Pebbles Azevedo MD at [facility] L&D OR    DILATION AND CURETTAGE OF UTERUS N/A 2020    DILATATION AND CURETTAGE SUCTION performed by Tobias Martinez MD at [address] [place] [address]      as an infant    [place] REPAIR ENTEROCELE, [address] N/A 2018    VAGINAL CANAL REPAIR performed by Tobias Martinez MD at [facility] OR     No family history on file. Social History     Tobacco Use    Smoking status: Never Smoker    Smokeless tobacco: Never Used   Substance Use Topics    Alcohol use: No     Alcohol/week: 0.0 standard drinks            Physical Exam  Constitutional:       General: She is not in acute distress. Appearance: Normal appearance. She is not ill-appearing or diaphoretic. HENT:      Head: Normocephalic and atraumatic. Nose: Nose normal. No rhinorrhea. Eyes:      General: No scleral icterus. Right eye: No discharge. Left eye: No discharge. Extraocular Movements: Extraocular movements intact. Pulmonary:      Effort: Pulmonary effort is normal. No respiratory distress. Musculoskeletal: Normal range of motion. Skin:     Coloration: Skin is not pale. Findings: No erythema or rash. Neurological:      Mental Status: She is alert and oriented to person, place, and time. Psychiatric:         Attention and Perception: Attention and perception normal.         Mood and Affect: Mood and affect normal.         Speech: Speech normal.         Behavior: Behavior normal. Behavior is cooperative. Thought Content: Thought content normal.         Cognition and Memory: Cognition and memory normal.         Judgment: Judgment normal.           The patient had her 28 week labs completed. T-Dap Vaccine (27-36 weeks): completed    The patient was instructed on fetal kick counts and was given a kick sheet to complete every 8 hours. She was instructed that the baby should move at a minimum of ten times within one hour after a meal. The patient was instructed to lay down on her left side twenty minutes after eating and count movements for up to one hour with a target value of ten movements. She was instructed to notify the office if she did not make that target after two attempts or if after any attempt there was less than four movements.     The patient reports that the targets have been made Yes. Assessment:   Diagnosis Orders   1. Encounter for supervision of normal pregnancy in multigravida in third trimester     2. Anemia complicating pregnancy, third trimester               Plan:  1. PTL precautions   2. Recommend Injectafer infusion  3. Return in 2 weeks                        Jacqueline Gomez is a 31 y.o. female being evaluated by a Virtual Visit (video visit) encounter to address concerns as mentioned above. A caregiver was present when appropriate. Due to this being a TeleHealth encounter (During [facility] public health emergency), evaluation of the following organ systems was limited: Vitals/Constitutional/EENT/Resp/CV/GI//MS/Neuro/Skin/Heme-Lymph-Imm. Pursuant to the emergency declaration under the [address] [place] [address] authority and the [employer] and [employer] Act, this Virtual Visit was conducted with patient's (and/or legal guardian's) consent, to reduce the patient's risk of exposure to COVID-19 and provide necessary medical care. The patient (and/or legal guardian) has also been advised to contact this office for worsening conditions or problems, and seek emergency medical treatment and/or call 911 if deemed necessary. Services were provided through a video synchronous discussion virtually to substitute for in-person clinic visit. Patient and provider were located at their individual homes. --Sonal Barrientos MD on 12/1/2020 at 2:51 PM    An electronic signature was used to authenticate this note.

## 2020-12-01 ENCOUNTER — TELEPHONE (OUTPATIENT)
Dept: OBGYN | Age: 25
End: 2020-12-01

## 2020-12-01 RX ORDER — FERRIC CARBOXYMALTOSE INJECTION 50 MG/ML
750 INJECTION, SOLUTION INTRAVENOUS
Qty: 30 ML | Refills: 0 | Status: SHIPPED | OUTPATIENT
Start: 2020-12-01 | End: 2021-05-25 | Stop reason: ALTCHOICE

## 2020-12-01 NOTE — TELEPHONE ENCOUNTER
Returned call to pt re iron infusion. Pt has decided to do the [place] [facility]. Order faxed to L&D, pt will go on 12-3-20 at 11am.  Pt v/u.

## 2020-12-03 ENCOUNTER — HOSPITAL ENCOUNTER (OUTPATIENT)
Age: 25
Discharge: HOME | End: 2020-12-03
Attending: OBSTETRICS & GYNECOLOGY | Admitting: OBSTETRICS & GYNECOLOGY

## 2020-12-03 VITALS
SYSTOLIC BLOOD PRESSURE: 111 MMHG | TEMPERATURE: 97.2 F | WEIGHT: 190 LBS | HEART RATE: 83 BPM | HEIGHT: 66 IN | BODY MASS INDEX: 30.53 KG/M2 | DIASTOLIC BLOOD PRESSURE: 70 MMHG

## 2020-12-03 PROCEDURE — 96365 THER/PROPH/DIAG IV INF INIT: CPT

## 2020-12-03 PROCEDURE — 96372 THER/PROPH/DIAG INJ SC/IM: CPT

## 2020-12-03 PROCEDURE — 99211 OFF/OP EST MAY X REQ PHY/QHP: CPT

## 2020-12-03 PROCEDURE — 2580000003 HC RX 258: Performed by: OBSTETRICS & GYNECOLOGY

## 2020-12-03 PROCEDURE — 6360000002 HC RX W HCPCS: Performed by: OBSTETRICS & GYNECOLOGY

## 2020-12-03 RX ORDER — SODIUM CHLORIDE 0.9 % (FLUSH) 0.9 %
10 SYRINGE (ML) INJECTION PRN
Status: DISCONTINUED | OUTPATIENT
Start: 2020-12-03 | End: 2020-12-03 | Stop reason: HOSPADM

## 2020-12-03 RX ORDER — ACETAMINOPHEN 325 MG/1
650 TABLET ORAL EVERY 4 HOURS PRN
Status: DISCONTINUED | OUTPATIENT
Start: 2020-12-03 | End: 2020-12-03 | Stop reason: HOSPADM

## 2020-12-03 RX ORDER — ONDANSETRON 2 MG/ML
4 INJECTION INTRAMUSCULAR; INTRAVENOUS EVERY 6 HOURS PRN
Status: DISCONTINUED | OUTPATIENT
Start: 2020-12-03 | End: 2020-12-03 | Stop reason: HOSPADM

## 2020-12-03 RX ORDER — SODIUM CHLORIDE 0.9 % (FLUSH) 0.9 %
10 SYRINGE (ML) INJECTION EVERY 12 HOURS SCHEDULED
Status: DISCONTINUED | OUTPATIENT
Start: 2020-12-03 | End: 2020-12-03 | Stop reason: HOSPADM

## 2020-12-03 RX ORDER — PROMETHAZINE HYDROCHLORIDE 25 MG/1
12.5 TABLET ORAL EVERY 6 HOURS PRN
Status: DISCONTINUED | OUTPATIENT
Start: 2020-12-03 | End: 2020-12-03 | Stop reason: HOSPADM

## 2020-12-03 RX ADMIN — FERRIC CARBOXYMALTOSE INJECTION 750 MG: 50 INJECTION, SOLUTION INTRAVENOUS at 12:14

## 2020-12-03 RX ADMIN — SODIUM CHLORIDE, PRESERVATIVE FREE 10 ML: 5 INJECTION INTRAVENOUS at 12:15

## 2020-12-03 NOTE — PROGRESS NOTES
Pt here for Iron infusion per Dr. Laura Dutton. Spoke with Dr. Laura Dutton, orders received for med infusion.

## 2020-12-10 ENCOUNTER — HOSPITAL ENCOUNTER (OUTPATIENT)
Age: 25
Discharge: HOME | End: 2020-12-10
Attending: OBSTETRICS & GYNECOLOGY | Admitting: OBSTETRICS & GYNECOLOGY

## 2020-12-10 PROBLEM — D64.9 ANEMIA: Status: ACTIVE | Noted: 2020-12-10

## 2020-12-10 PROCEDURE — 99211 OFF/OP EST MAY X REQ PHY/QHP: CPT

## 2020-12-10 PROCEDURE — 6360000002 HC RX W HCPCS: Performed by: OBSTETRICS & GYNECOLOGY

## 2020-12-10 PROCEDURE — 96374 THER/PROPH/DIAG INJ IV PUSH: CPT

## 2020-12-10 RX ORDER — ONDANSETRON 2 MG/ML
4 INJECTION INTRAMUSCULAR; INTRAVENOUS EVERY 6 HOURS PRN
Status: DISCONTINUED | OUTPATIENT
Start: 2020-12-10 | End: 2020-12-10 | Stop reason: HOSPADM

## 2020-12-10 RX ORDER — ACETAMINOPHEN 325 MG/1
650 TABLET ORAL EVERY 4 HOURS PRN
Status: DISCONTINUED | OUTPATIENT
Start: 2020-12-10 | End: 2020-12-10 | Stop reason: HOSPADM

## 2020-12-10 RX ORDER — PROMETHAZINE HYDROCHLORIDE 25 MG/1
12.5 TABLET ORAL EVERY 6 HOURS PRN
Status: DISCONTINUED | OUTPATIENT
Start: 2020-12-10 | End: 2020-12-10 | Stop reason: HOSPADM

## 2020-12-10 RX ADMIN — FERRIC CARBOXYMALTOSE INJECTION 750 MG: 50 INJECTION, SOLUTION INTRAVENOUS at 16:15

## 2020-12-14 ENCOUNTER — TELEMEDICINE (OUTPATIENT)
Dept: OBGYN | Age: 25
End: 2020-12-14

## 2020-12-14 PROCEDURE — 99213 OFFICE O/P EST LOW 20 MIN: CPT | Performed by: OBSTETRICS & GYNECOLOGY

## 2020-12-14 NOTE — PATIENT INSTRUCTIONS
Patient Education        Weeks 32 to 29 of Your Pregnancy: Care Instructions  Your Care Instructions     During the last few weeks of your pregnancy, you may have more aches and pains. It's important to rest when you can. Your growing baby is putting more pressure on your bladder. So you may need to urinate more often. Hemorrhoids are also common. These are painful, itchy veins in the rectal area. In the 36th week, most women have a test for group B streptococcus (GBS). GBS is a common bacteria that can live in the vagina and rectum. It can make your baby sick after birth. If you test positive, you will get antibiotics during labor. These will keep your baby from getting the bacteria. You may want to talk with your doctor about banking your baby's umbilical cord blood. This is the blood left in the cord after birth. If you want to save this blood, you must arrange it ahead of time. You can't decide at the last minute. If you haven't already had the Tdap shot during this pregnancy, talk to your doctor about getting it. It will help protect your  against pertussis infection. Follow-up care is a key part of your treatment and safety. Be sure to make and go to all appointments, and call your doctor if you are having problems. It's also a good idea to know your test results and keep a list of the medicines you take. How can you care for yourself at home? Ease hemorrhoids  · Get more liquids, fruits, vegetables, and fiber in your diet. This will help keep your stools soft. · Avoid sitting for too long. Lie on your left side several times a day. · Clean yourself with soft, moist toilet paper. Or you can use witch hazel pads or personal hygiene pads. · If you are uncomfortable, try ice packs. Or you can sit in a warm sitz bath. Do these for 20 minutes at a time, as needed. · Use hydrocortisone cream for pain and itching. Two examples are Anusol and Preparation H Hydrocortisone.   · Ask your doctor about taking an over-the-counter stool softener. Consider breastfeeding  · Experts recommend that women breastfeed for 1 year or longer. · Breast milk may help protect your child from some health problems.  babies are less likely than formula-fed babies to:  ? Get ear infections, colds, diarrhea, and pneumonia. ? Be obese or get diabetes later in life. · Women who breastfeed have less bleeding after the birth. Their uteruses also shrink back faster. · Some women who breastfeed lose weight faster. Making milk burns calories. · Breastfeeding can lower your risk of breast cancer, ovarian cancer, and osteoporosis. Decide about circumcision for boys  · As you make this decision, it may help to think about your personal, Mandaen, and family traditions. You get to decide if you will keep your son's penis natural or if he will be circumcised. · If you decide that you would like to have your baby circumcised, talk with your doctor. You can share your concerns about pain. And you can discuss your preferences for anesthesia. Where can you learn more? Go to [email or web address] and sign in to your Fl[employer]ster account. Enter F855 in the KickSport box to learn more about \"Weeks 32 to 34 of Your Pregnancy: Care Instructions. \"     If you do not have an account, please click on the \"Sign Up Now\" link. Current as of: February 11, 2020               Content Version: 12.6  © 1474-8958 [employer]. Care instructions adapted under license by [facility] ([facility]). If you have questions about a medical condition or this instruction, always ask your healthcare professional. [place] [address] any warranty or liability for your use of this information.

## 2020-12-14 NOTE — PROGRESS NOTES
2020    TELEHEALTH EVALUATION -- Audio/Visual (During IJIXU-99 public health emergency)   Kathleen Ortiz is a 31 y.o. female 33w6d who presents for routine prenatal visit. The patient was seen and evaluated. There was positive fetal movements. No contractions, bleeding or leakage of fluid. Signs and symptoms of  labor as well as labor were reviewed. The S/S of Pre-Eclampsia were reviewed with the patient in detail. She is to report any of these if they occur. She currently denies any of these. Pt states she had a video visit with a neonatologist today regarding baby's kidneys. They will want an ultrasound 2 days after birth. Pt had 2 Injectafer infusions for anemia. Sandy Deshpande is a 31 y.o. female with the following history as recorded in [facility]:  Patient Active Problem List    Diagnosis Date Noted    Anemia 12/10/2020    Incomplete       Current Outpatient Medications   Medication Sig Dispense Refill    ferric carboxymaltose (INJECTAFER) 750 MG/15ML SOLN IV solution Infuse 15 mLs intravenously every 7 days for 2 doses 30 mL 0    doxylamine-pyridoxine 10-10 MG TBEC Take 2 tabs PO qhs. If symptoms persists after 2 days increase to 1 tab PO qam and 2 tabs PO qhs 60 tablet 3    promethazine (PHENERGAN) 25 MG tablet Take 1 tablet by mouth every 6 hours as needed for Nausea 60 tablet 0    ferrous sulfate (IRON 325) 325 (65 Fe) MG tablet Take 1 tablet by mouth 2 times daily 60 tablet 5    docusate sodium (COLACE) 100 MG capsule Take 1 capsule by mouth 3 times daily as needed for Constipation 90 capsule 3    Prenatal MV-Min-Fe Fum-FA-DHA (PRENATAL 1 PO) Take by mouth       No current facility-administered medications for this visit. Allergies: Patient has no known allergies.   Past Medical History:   Diagnosis Date    Anemia     Miscarriage      Past Surgical History:   Procedure Laterality Date     SECTION N/A 2017     SECTION performed [facility] at [facility]    DILATION AND CURETTAGE OF UTERUS N/A 4/13/2020    DILATATION AND CURETTAGE SUCTION performed by Vega Faulkner MD at [facility]      as an infant    [place] REPAIR ENTEROCELE, [address] N/A 8/21/2018    VAGINAL CANAL REPAIR performed by Vega Faulkner MD at [address]     No family history on file. Social History     Tobacco Use    Smoking status: Never Smoker    Smokeless tobacco: Never Used   Substance Use Topics    Alcohol use: No     Alcohol/week: 0.0 standard drinks            Physical Exam  Constitutional:       General: She is not in acute distress. Appearance: Normal appearance. She is not ill-appearing or diaphoretic. HENT:      Head: Normocephalic and atraumatic. Nose: Nose normal. No rhinorrhea. Eyes:      General: No scleral icterus. Right eye: No discharge. Left eye: No discharge. Extraocular Movements: Extraocular movements intact. Pulmonary:      Effort: Pulmonary effort is normal. No respiratory distress. Musculoskeletal: Normal range of motion. Skin:     Coloration: Skin is not pale. Findings: No erythema or rash. Neurological:      Mental Status: She is alert and oriented to person, place, and time. Psychiatric:         Attention and Perception: Attention and perception normal.         Mood and Affect: Mood and affect normal.         Speech: Speech normal.         Behavior: Behavior normal. Behavior is cooperative. Thought Content: Thought content normal.         Cognition and Memory: Cognition and memory normal.         Judgment: Judgment normal.           The patient had her 28 week labs completed. T-Dap Vaccine (27-36 weeks): completed    The patient was instructed on fetal kick counts and was given a kick sheet to complete every 8 hours.  She was instructed that the baby should move at a minimum of ten times within one hour after a meal. The patient was instructed to lay down on her left side twenty minutes after eating and count movements for up to one hour with a target value of ten movements. She was instructed to notify the office if she did not make that target after two attempts or if after any attempt there was less than four movements. The patient reports that the targets have been made Yes. Assessment:   Diagnosis Orders   1. Anemia during pregnancy  CBC Auto Differential   2. 33 weeks gestation of pregnancy               Plan:  1. PTL precautions   2. Return in 2 weeks in office  3. Cbc next visit  I Ladarius Reyes, am scribing for and in the presence of Dr. Frank Calderon. I, Dr. Frank Calderon, personally performed the services described in this documentation as scribed by Ladarius Reyes in my presence, and it is both accurate and complete. Melanie Lizama is a 31 y.o. female being evaluated by a Virtual Visit (video visit) encounter to address concerns as mentioned above. A caregiver was present when appropriate. Due to this being a TeleHealth encounter (During [address] emergency), evaluation of the following organ systems was limited: Vitals/Constitutional/EENT/Resp/CV/GI//MS/Neuro/Skin/Heme-Lymph-Imm. Pursuant to the emergency declaration under the [address] [place] [address] authority and the [employer] Act, this Virtual Visit was conducted with patient's (and/or legal guardian's) consent, to reduce the patient's risk of exposure to COVID-19 and provide necessary medical care. The patient (and/or legal guardian) has also been advised to contact this office for worsening conditions or problems, and seek emergency medical treatment and/or call 911 if deemed necessary. Services were provided through a video synchronous discussion virtually to substitute for in-person clinic visit.  Patient and provider were located at their individual homes.    --Errol Cortes RN on 12/14/2020 at 4:29 PM    An electronic signature was used to authenticate this note.

## 2020-12-28 ENCOUNTER — ROUTINE PRENATAL (OUTPATIENT)
Dept: OBGYN | Age: 25
End: 2020-12-28

## 2020-12-28 VITALS
HEART RATE: 86 BPM | SYSTOLIC BLOOD PRESSURE: 106 MMHG | BODY MASS INDEX: 32.6 KG/M2 | WEIGHT: 202 LBS | DIASTOLIC BLOOD PRESSURE: 69 MMHG

## 2020-12-28 DIAGNOSIS — Z34.83 ENCOUNTER FOR SUPERVISION OF OTHER NORMAL PREGNANCY IN THIRD TRIMESTER: ICD-10-CM

## 2020-12-28 LAB
ABO/RH: NORMAL
ANTIBODY IDENTIFICATION: NORMAL
ANTIBODY SCREEN: NORMAL

## 2020-12-28 PROCEDURE — 99214 OFFICE O/P EST MOD 30 MIN: CPT | Performed by: OBSTETRICS & GYNECOLOGY

## 2020-12-28 NOTE — PATIENT INSTRUCTIONS
Patient Education        Week 37 of Your Pregnancy: Care Instructions  Your Care Instructions     You are near the end of your pregnancy--and you're probably pretty uncomfortable. It may be harder to walk around. Lying down probably isn't comfortable either. You may have trouble getting to sleep or staying asleep. Most women deliver their babies between 40 and 41 weeks. This is a good time to think about packing a bag for the hospital with items you'll need. Then you'll be ready when labor starts. Follow-up care is a key part of your treatment and safety. Be sure to make and go to all appointments, and call your doctor if you are having problems. It's also a good idea to know your test results and keep a list of the medicines you take. How can you care for yourself at home? Learn about breastfeeding  · Breastfeeding is best for your baby and good for you. · Breast milk has antibodies to help your baby fight infections. · Mothers who breastfeed often lose weight faster, because making milk burns calories. · Learning the best ways to hold your baby will make breastfeeding easier. · Let your partner bathe and diaper the baby to keep your partner from feeling left out. Snuggle together when you breastfeed. · You may want to learn how to use a breast pump and store your milk. · If you choose to bottle feed, make the feeding feel like breastfeeding so you can bond with your baby. Always hold your baby and the bottle. Do not prop bottles or let your baby fall asleep with a bottle. Learn about crying  · It is common for babies to cry for 1 to 3 hours a day. Some cry more, some cry less. · Babies don't cry to make you upset or because you are a bad parent. · Crying is how your baby communicates. Your baby may be hungry; have gas; need a diaper change; or feel cold, warm, tired, lonely, or tense. Sometimes babies cry for unknown reasons.   · If you respond to your baby's needs, he or she will learn to trust you.  · Try to stay calm when your baby cries. Your baby may get more upset if he or she senses that you are upset. Know how to care for your   · Your baby's umbilical cord stump will drop off on its own, usually between 1 and 2 weeks. To care for your baby's umbilical cord area:  ? Clean the area at the bottom of the cord 2 or 3 times a day. ? Pay special attention to the area where the cord attaches to the skin. ? Keep the diaper folded below the cord. ? Use a damp washcloth or cotton ball to sponge bathe your baby until the stump has come off. · Your baby's first dark stool is called meconium. After the meconium is passed, your baby will develop his or her own bowel pattern. ? Some babies, especially  babies, have several bowel movements a day. Others have one or two a day, or one every 2 to 3 days. ?  babies often have loose, yellow stools. Formula-fed babies have more formed stools. ? If your baby's stools look like little pellets, he or she is constipated. After 2 days of constipation, call your baby's doctor. · If your baby will be circumcised, you can care for him at home. ? Gently rinse his penis with warm water after every diaper change. Do not try to remove the film that forms on the penis. This film will go away on its own. Pat dry. ? Put petroleum ointment, such as Vaseline, on the area of the diaper that will touch your baby's penis. This will keep the diaper from sticking to your baby. ? Ask the doctor about giving your baby acetaminophen (Tylenol) for pain. Where can you learn more? Go to https://chadieb.healthNanoMas [email or web address] org and sign in to your Ult[employer]ex account. Enter 68 21 [id number] in the [employer] box to learn more about \"Week 37 of Your Pregnancy: Care Instructions. \"     If you do not have an account, please click on the \"Sign Up Now\" link. Current as of: 2020               Content Version: 12.6  © 3652-8906 [employer].

## 2020-12-28 NOTE — PROGRESS NOTES
Aquilino Palafox is a 31 y.o. female 35w6d who presents for routine prenatal visit. The patient was seen and evaluated. There was positive fetal movements. No contractions, bleeding or leakage of fluid. Signs and symptoms of  labor as well as labor were reviewed. The S/S of Pre-Eclampsia were reviewed with the patient in detail. She is to report any of these if they occur. She currently denies any of these. Lakshmi Stevenson is a 31 y.o. female with the following history as recorded in [facility]:  Patient Active Problem List    Diagnosis Date Noted    Anemia 12/10/2020    Incomplete       Current Outpatient Medications   Medication Sig Dispense Refill    ferrous sulfate (IRON 325) 325 (65 Fe) MG tablet Take 1 tablet by mouth 2 times daily 60 tablet 5    docusate sodium (COLACE) 100 MG capsule Take 1 capsule by mouth 3 times daily as needed for Constipation 90 capsule 3    Prenatal MV-Min-Fe Fum-FA-DHA (PRENATAL 1 PO) Take by mouth      ferric carboxymaltose (INJECTAFER) 750 MG/15ML SOLN IV solution Infuse 15 mLs intravenously every 7 days for 2 doses 30 mL 0    doxylamine-pyridoxine 10-10 MG TBEC Take 2 tabs PO qhs. If symptoms persists after 2 days increase to 1 tab PO qam and 2 tabs PO qhs (Patient not taking: Reported on 2020) 60 tablet 3     No current facility-administered medications for this visit. Allergies: Patient has no known allergies.   Past Medical History:   Diagnosis Date    Anemia     Miscarriage      Past Surgical History:   Procedure Laterality Date     SECTION N/A 2017     [facility]    DILATION AND CURETTAGE OF UTERUS N/A 2020    DILATATION AND CURETTAGE SUCTION performed by Herman Mccauley MD at [facility]      as an infant    [place] REPAIR ENTEROCELE, [address] N/A 2018    VAGINAL CANAL REPAIR performed by Herman Mccauley MD at [facility] reviewed. No pertinent family history. Social History     Tobacco Use    Smoking status: Never Smoker    Smokeless tobacco: Never Used   Substance Use Topics    Alcohol use: No     Alcohol/week: 0.0 standard drinks         Mother's Prenatal Vitals  BP: 106/69  Weight: 202 lb (91.6 kg)  Pulse: 86  Patient Position: Sitting  Alb/Glu  Albumin: Negative  Glucose: Negative  Prenatal Fetal Information  Fundal Height (cm): 36 cm  Fetal Heart Rate: 146  Movement: Present  Presentation: Vertex  Dil/Eff/Sta  Dilation (cm): 2  Effacement (%): 60  Station: -3  Physical Exam  Constitutional:       General: She is not in acute distress. Appearance: Normal appearance. She is not ill-appearing, toxic-appearing or diaphoretic. HENT:      Head: Normocephalic and atraumatic. Eyes:      Extraocular Movements: Extraocular movements intact. Pulmonary:      Effort: Pulmonary effort is normal. No respiratory distress. Abdominal:      Palpations: Abdomen is soft. Tenderness: There is no abdominal tenderness. Genitourinary:     General: Normal vulva. Vagina: No vaginal discharge. Musculoskeletal: Normal range of motion. General: No swelling or tenderness. Right lower leg: No edema. Left lower leg: No edema. Skin:     General: Skin is warm and dry. Findings: No rash. Neurological:      Mental Status: She is alert and oriented to person, place, and time. Psychiatric:         Attention and Perception: Attention and perception normal.         Mood and Affect: Mood and affect normal.         Speech: Speech normal.         Behavior: Behavior normal. Behavior is cooperative. Thought Content: Thought content normal.         Cognition and Memory: Cognition and memory normal.         Judgment: Judgment normal.           The patient had her 28 week labs completed.     T-Dap Vaccine (27-36 weeks): completed    The patient was instructed on fetal kick counts and was given a kick sheet to complete every 8 hours. She was instructed that the baby should move at a minimum of ten times within one hour after a meal. The patient was instructed to lay down on her left side twenty minutes after eating and count movements for up to one hour with a target value of ten movements. She was instructed to notify the office if she did not make that target after two attempts or if after any attempt there was less than four movements. The patient reports that the targets have been made Yes. Assessment:   Diagnosis Orders   1. Encounter for supervision of other normal pregnancy in third trimester  HIV Obstetric Panel    Culture, Strep B Screen, Vaginal/Rectal   2. Anemia during pregnancy               Plan:  1. PTL precautions   2. Return in 1 weeks  3. RPR, HIV, HEP B, cbc  4. GBS today   5. Pt concerned about vaginal tearing, discussed size of baby. 6. Pt having a growth ultrasound  I Judy Hoffman, am scribing for and in the presence of Dr. Rosa Edmond. I, Dr. Rosa Edmond, personally performed the services described in this documentation as scribed by Judy Hoffman in my presence, and it is both accurate and complete.

## 2020-12-31 LAB
BASOPHILS ABSOLUTE: 0 K/UL (ref 0–0.2)
BASOPHILS RELATIVE PERCENT: 0.4 % (ref 0–1)
EOSINOPHILS ABSOLUTE: 0 K/UL (ref 0–0.6)
EOSINOPHILS RELATIVE PERCENT: 0.5 % (ref 0–5)
GROUP B STREP CULTURE: NORMAL
HCT VFR BLD CALC: 35.1 % (ref 37–47)
HEMOGLOBIN: 11.2 G/DL (ref 12–16)
HEPATITIS B SURFACE ANTIGEN INTERPRETATION: ABNORMAL
HIV-1 P24 AG: ABNORMAL
IMMATURE GRANULOCYTES #: 0.2 K/UL
LYMPHOCYTES ABSOLUTE: 1.5 K/UL (ref 1.1–4.5)
LYMPHOCYTES RELATIVE PERCENT: 19.9 % (ref 20–40)
MCH RBC QN AUTO: 28 PG (ref 27–31)
MCHC RBC AUTO-ENTMCNC: 31.9 G/DL (ref 33–37)
MCV RBC AUTO: 87.8 FL (ref 81–99)
MONOCYTES ABSOLUTE: 0.5 K/UL (ref 0–0.9)
MONOCYTES RELATIVE PERCENT: 6.2 % (ref 0–10)
NEUTROPHILS ABSOLUTE: 5.5 K/UL (ref 1.5–7.5)
NEUTROPHILS RELATIVE PERCENT: 71.1 % (ref 50–65)
PDW BLD-RTO: 20.5 % (ref 11.5–14.5)
PLATELET # BLD: 125 K/UL (ref 130–400)
PMV BLD AUTO: 10.6 FL (ref 9.4–12.3)
RAPID HIV 1&2: ABNORMAL
RBC # BLD: 4 M/UL (ref 4.2–5.4)
RPR: ABNORMAL
RUBELLA ANTIBODY IGG: REACTIVE
WBC # BLD: 7.7 K/UL (ref 4.8–10.8)

## 2021-01-04 ENCOUNTER — ROUTINE PRENATAL (OUTPATIENT)
Dept: OBGYN CLINIC | Age: 26
End: 2021-01-04

## 2021-01-04 VITALS
SYSTOLIC BLOOD PRESSURE: 104 MMHG | BODY MASS INDEX: 33.25 KG/M2 | HEART RATE: 78 BPM | DIASTOLIC BLOOD PRESSURE: 65 MMHG | WEIGHT: 206 LBS

## 2021-01-04 DIAGNOSIS — Z34.83 ENCOUNTER FOR SUPERVISION OF OTHER NORMAL PREGNANCY IN THIRD TRIMESTER: Primary | ICD-10-CM

## 2021-01-04 DIAGNOSIS — Z3A.36 36 WEEKS GESTATION OF PREGNANCY: ICD-10-CM

## 2021-01-04 PROCEDURE — 99213 OFFICE O/P EST LOW 20 MIN: CPT | Performed by: OBSTETRICS & GYNECOLOGY

## 2021-01-04 NOTE — PROGRESS NOTES
Pt is here for prenatal appointment. She denies spotting, cramping, contractions. Pt states she has been having some discharge that looks like mucus.

## 2021-01-04 NOTE — PROGRESS NOTES
Alena Otto is a 31 y.o. female 36w6d who presents for routine prenatal visit. The patient was seen and evaluated. There was positive fetal movements. No contractions, bleeding or leakage of fluid. Signs and symptoms of  labor as well as labor were reviewed. The S/S of Pre-Eclampsia were reviewed with the patient in detail. She is to report any of these if they occur. She currently denies any of these. Rosa Helms is a 31 y.o. female with the following history as recorded in [facility]:  Patient Active Problem List    Diagnosis Date Noted    Anemia 12/10/2020    Incomplete       Current Outpatient Medications   Medication Sig Dispense Refill    ferrous sulfate (IRON 325) 325 (65 Fe) MG tablet Take 1 tablet by mouth 2 times daily 60 tablet 5    docusate sodium (COLACE) 100 MG capsule Take 1 capsule by mouth 3 times daily as needed for Constipation 90 capsule 3    Prenatal MV-Min-Fe Fum-FA-DHA (PRENATAL 1 PO) Take by mouth      ferric carboxymaltose (INJECTAFER) 750 MG/15ML SOLN IV solution Infuse 15 mLs intravenously every 7 days for 2 doses 30 mL 0    doxylamine-pyridoxine 10-10 MG TBEC Take 2 tabs PO qhs. If symptoms persists after 2 days increase to 1 tab PO qam and 2 tabs PO qhs (Patient not taking: Reported on 2020) 60 tablet 3     No current facility-administered medications for this visit. Allergies: Patient has no known allergies.   Past Medical History:   Diagnosis Date    Anemia     Miscarriage      Past Surgical History:   Procedure Laterality Date     SECTION N/A 2017     SECTION performed by Sharath Aldana MD at [facility] L&D OR    DILATION AND CURETTAGE OF UTERUS N/A 2020    DILATATION AND CURETTAGE SUCTION performed by Claudetta Phy, MD at [facility]      as an infant    [place] REPAIR ENTEROCELE, [address] N/A 2018    VAGINAL CANAL REPAIR performed by Claudetta Phy, MD at [facility] history on file. Social History     Tobacco Use    Smoking status: Never Smoker    Smokeless tobacco: Never Used   Substance Use Topics    Alcohol use: No     Alcohol/week: 0.0 standard drinks         Mother's Prenatal Vitals  BP: 104/65  Weight: 206 lb (93.4 kg)  Pulse: 78  Patient Position: Sitting  Alb/Glu  Albumin: Trace  Glucose: Negative  Prenatal Fetal Information  Fundal Height (cm): 37 cm  Fetal Heart Rate: 150  Movement: Present  Dil/Eff/Sta  Dilation (cm): 3  Effacement (%): 70  Station: -2  Physical Exam  Constitutional:       General: She is not in acute distress. Appearance: Normal appearance. She is not ill-appearing, toxic-appearing or diaphoretic. HENT:      Head: Normocephalic and atraumatic. Eyes:      Extraocular Movements: Extraocular movements intact. Pulmonary:      Effort: Pulmonary effort is normal. No respiratory distress. Abdominal:      Palpations: Abdomen is soft. Tenderness: There is no abdominal tenderness. Musculoskeletal: Normal range of motion. General: No swelling or tenderness. Right lower leg: No edema. Left lower leg: No edema. Skin:     General: Skin is warm and dry. Findings: No rash. Neurological:      Mental Status: She is alert and oriented to person, place, and time. Psychiatric:         Attention and Perception: Attention and perception normal.         Mood and Affect: Mood and affect normal.         Speech: Speech normal.         Behavior: Behavior normal. Behavior is cooperative. Thought Content: Thought content normal.         Cognition and Memory: Cognition and memory normal.         Judgment: Judgment normal.           The patient had her 28 week labs completed. T-Dap Vaccine (27-36 weeks): completed    The patient was instructed on fetal kick counts and was given a kick sheet to complete every 8 hours.  She was instructed that the baby should move at a minimum of ten times within one hour after a meal. The patient was instructed to lay down on her left side twenty minutes after eating and count movements for up to one hour with a target value of ten movements. She was instructed to notify the office if she did not make that target after two attempts or if after any attempt there was less than four movements. The patient reports that the targets have been made Yes. Assessment:   Diagnosis Orders   1. Encounter for supervision of other normal pregnancy in third trimester     2. 36 weeks gestation of pregnancy               Plan:  1. Labor precautions   2. Return in 1 weeks  I Pedro Guadalupe, am scribing for and in the presence of Dr. Esther Rodarte. I, Dr. Esther Rodarte, personally performed the services described in this documentation as scribed by Pedro Guadalupe in my presence, and it is both accurate and complete.

## 2021-01-04 NOTE — PATIENT INSTRUCTIONS
you.  · Try to stay calm when your baby cries. Your baby may get more upset if he or she senses that you are upset. Know how to care for your   · Your baby's umbilical cord stump will drop off on its own, usually between 1 and 2 weeks. To care for your baby's umbilical cord area:  ? Clean the area at the bottom of the cord 2 or 3 times a day. ? Pay special attention to the area where the cord attaches to the skin. ? Keep the diaper folded below the cord. ? Use a damp washcloth or cotton ball to sponge bathe your baby until the stump has come off. · Your baby's first dark stool is called meconium. After the meconium is passed, your baby will develop his or her own bowel pattern. ? Some babies, especially  babies, have several bowel movements a day. Others have one or two a day, or one every 2 to 3 days. ?  babies often have loose, yellow stools. Formula-fed babies have more formed stools. ? If your baby's stools look like little pellets, he or she is constipated. After 2 days of constipation, call your baby's doctor. · If your baby will be circumcised, you can care for him at home. ? Gently rinse his penis with warm water after every diaper change. Do not try to remove the film that forms on the penis. This film will go away on its own. Pat dry. ? Put petroleum ointment, such as Vaseline, on the area of the diaper that will touch your baby's penis. This will keep the diaper from sticking to your baby. ? Ask the doctor about giving your baby acetaminophen (Tylenol) for pain. Where can you learn more? Go to https://[email or web address].[email or web address] org and sign in to your [employer] account. Enter 68 [phone] [id number] in the [employer] box to learn more about \"Week 37 of Your Pregnancy: Care Instructions. \"     If you do not have an account, please click on the \"Sign Up Now\" link. Current as of: 2020               Content Version: 12.6  © 8341-0352 [employer]. Care instructions adapted under license by [facility] ([facility]). If you have questions about a medical condition or this instruction, always ask your healthcare professional. [address] any warranty or liability for your use of this information.

## 2021-01-11 ENCOUNTER — ROUTINE PRENATAL (OUTPATIENT)
Dept: OBGYN CLINIC | Age: 26
End: 2021-01-11

## 2021-01-11 VITALS
WEIGHT: 206 LBS | BODY MASS INDEX: 33.25 KG/M2 | HEART RATE: 77 BPM | SYSTOLIC BLOOD PRESSURE: 105 MMHG | DIASTOLIC BLOOD PRESSURE: 64 MMHG

## 2021-01-11 DIAGNOSIS — O26.843 UTERINE SIZE DATE DISCREPANCY PREGNANCY, THIRD TRIMESTER: ICD-10-CM

## 2021-01-11 DIAGNOSIS — Z3A.37 37 WEEKS GESTATION OF PREGNANCY: ICD-10-CM

## 2021-01-11 DIAGNOSIS — Z34.83 ENCOUNTER FOR SUPERVISION OF NORMAL PREGNANCY IN MULTIGRAVIDA IN THIRD TRIMESTER: Primary | ICD-10-CM

## 2021-01-11 DIAGNOSIS — O34.219 HX SUCCESSFUL VBAC (VAGINAL BIRTH AFTER CESAREAN), CURRENTLY PREGNANT: ICD-10-CM

## 2021-01-11 PROCEDURE — 99213 OFFICE O/P EST LOW 20 MIN: CPT | Performed by: OBSTETRICS & GYNECOLOGY

## 2021-01-11 NOTE — PROGRESS NOTES
Myriam Ulloa is a 31 y.o. female 37w6d who presents for routine prenatal visit. The patient was seen and evaluated. There was normal fetal movements. No contractions, bleeding or leakage of fluid. Signs and symptoms of  labor as well as labor were reviewed. The S/S of Pre-Eclampsia were reviewed with the patient in detail. She is to report any of these if they occur. She currently denies any of these. Alfred Frausto is a 31 y.o. female with the following history as recorded in [facility]:  Patient Active Problem List    Diagnosis Date Noted    Anemia 12/10/2020    Incomplete       Current Outpatient Medications   Medication Sig Dispense Refill    ferrous sulfate (IRON 325) 325 (65 Fe) MG tablet Take 1 tablet by mouth 2 times daily 60 tablet 5    docusate sodium (COLACE) 100 MG capsule Take 1 capsule by mouth 3 times daily as needed for Constipation 90 capsule 3    Prenatal MV-Min-Fe Fum-FA-DHA (PRENATAL 1 PO) Take by mouth      ferric carboxymaltose (INJECTAFER) 750 MG/15ML SOLN IV solution Infuse 15 mLs intravenously every 7 days for 2 doses 30 mL 0    doxylamine-pyridoxine 10-10 MG TBEC Take 2 tabs PO qhs. If symptoms persists after 2 days increase to 1 tab PO qam and 2 tabs PO qhs (Patient not taking: Reported on 2020) 60 tablet 3     No current facility-administered medications for this visit. Allergies: Patient has no known allergies.   Past Medical History:   Diagnosis Date    Anemia     Miscarriage      Past Surgical History:   Procedure Laterality Date     SECTION N/A 2017     SECTION performed by Tomy Quintanilla MD at [facility] L&D OR    DILATION AND CURETTAGE OF UTERUS N/A 2020    DILATATION AND CURETTAGE SUCTION performed by [facility] at [facility]      as an infant    ID REPAIR ENTEROCELE, W Beti Rodgerse N/A 2018    VAGINAL CANAL REPAIR performed by Alka Moore MD at [address] [place] [address] reviewed. No pertinent family history. Social History     Tobacco Use    Smoking status: Never Smoker    Smokeless tobacco: Never Used   Substance Use Topics    Alcohol use: No     Alcohol/week: 0.0 standard drinks         Mother's Prenatal Vitals  BP: 105/64  Weight: 206 lb (93.4 kg)  Pulse: 77  Patient Position: Sitting  Alb/Glu  Albumin: Negative  Glucose: Negative  Prenatal Fetal Information  Fetal Heart Rate: 134  Movement: Present  Dil/Eff/Sta  Dilation (cm): 4  Effacement (%): 80  Station: -2  Physical Exam  Constitutional:       General: She is not in acute distress. Appearance: Normal appearance. She is not ill-appearing, toxic-appearing or diaphoretic. HENT:      Head: Normocephalic and atraumatic. Eyes:      Extraocular Movements: Extraocular movements intact. Pulmonary:      Effort: Pulmonary effort is normal. No respiratory distress. Abdominal:      Palpations: Abdomen is soft. Tenderness: There is no abdominal tenderness. Musculoskeletal: Normal range of motion. General: No swelling or tenderness. Right lower leg: No edema. Left lower leg: No edema. Skin:     General: Skin is warm and dry. Findings: No rash. Neurological:      Mental Status: She is alert and oriented to person, place, and time. Psychiatric:         Attention and Perception: Attention and perception normal.         Mood and Affect: Mood and affect normal.         Speech: Speech normal.         Behavior: Behavior normal. Behavior is cooperative. Thought Content: Thought content normal.         Cognition and Memory: Cognition and memory normal.         Judgment: Judgment normal.           The patient was instructed on fetal kick counts and was given a kick sheet to complete every 8 hours.  She was instructed that the baby should move at a minimum of ten times within one hour after a meal. The patient was instructed to lay down on her left side twenty minutes after eating and count movements for up to one hour with a target value of ten movements. She was instructed to notify the office if she did not make that target after two attempts or if after any attempt there was less than four movements. The patient reports that the targets have been made Yes. Assessment:   Diagnosis Orders   1. Encounter for supervision of normal pregnancy in multigravida in third trimester     2. 37 weeks gestation of pregnancy     3. Hx successful  (vaginal birth after ), currently pregnant     4. Uterine size date discrepancy pregnancy, third trimester               Plan:  1. Labor precautions   2. Consider augmentation if still pregnant beyond 39 wks  3.  Return in 1 week

## 2021-01-11 NOTE — PATIENT INSTRUCTIONS
Patient Education        Week 45 of Your Pregnancy: Care Instructions  Your Care Instructions     Believe it or not, your baby is almost here. You may have ideas about your baby's personality because of how much he or she moves. Or you may have noticed how he or she responds to sounds, warmth, cold, and light. You may even know what kind of music your baby likes. By now, you have a better idea of what to expect during delivery. You may have talked about your birth preferences with your doctor. But even if you want a vaginal birth, it is a good idea to learn about  births.  birth means that your baby is born through a cut (incision) in your lower belly. It is sometimes the best choice for the health of the baby and the mother. This care sheet can help you understand  births. It also gives you information about what to expect after your baby is born. And it helps you understand more about postpartum depression. Follow-up care is a key part of your treatment and safety. Be sure to make and go to all appointments, and call your doctor if you are having problems. It's also a good idea to know your test results and keep a list of the medicines you take. How can you care for yourself at home? Learn about  birth  · Most C-sections are unplanned. They are done because of problems that occur during labor. These problems might include:  ? Labor that slows or stops. ? High blood pressure or other problems for the mother. ? Signs of distress in the baby. These signs may include a very fast or slow heart rate. · Although most mothers and babies do well after , it is major surgery. It has more risks than a vaginal delivery. · In some cases, a planned  may be safer than a vaginal delivery. This may be the case if:  ? The mother has a health problem, such as a heart condition. ? The baby isn't in a head-down position for delivery. This is called a breech position. ?  The uterus has scars from past surgeries. This could increase the chance of a tear in the uterus. ? There is a problem with the placenta. ? The mother has an infection, such as genital herpes, that could be spread to the baby. ? The mother is having twins or more. ? The baby weighs 9 to 10 pounds or more. · Because of the risks of , planned C-sections generally should be done only for medical reasons. And a planned  should be done at 39 weeks or later unless there is a medical reason to do it sooner. Know what to expect after delivery, and plan for the first few weeks at home  · You, your baby, and your partner or  will get identification bands. Only people with matching bands can  the baby from the nursery. · You will learn how to feed, diaper, and bathe your baby. And you will learn how to care for the umbilical cord stump. If your baby will be circumcised, you will also learn how to care for that. · Ask people to wait to visit you until you are at home. And ask them to wash their hands before they touch your baby. · Make sure you have another adult in your home for at least 2 or 3 days after the birth. · During the first 2 weeks, limit when friends and family can visit. · Do not allow visitors who have colds or infections. Make sure all visitors are up to date with their vaccinations. Never let anyone smoke around your baby. · Try to nap when the baby naps. Be aware of postpartum depression  · \"Baby blues\" are common for the first 1 to 2 weeks after birth. You may cry or feel sad or irritable for no reason. · For some women, these feelings last longer and are more intense. This is called postpartum depression. · If your symptoms last for more than a few weeks or you feel very depressed, ask your doctor for help. · Postpartum depression can be treated. Support groups and counseling can help. Sometimes medicine can also help. Where can you learn more?   Go to [email or web address] and sign in to your [employer] account. Enter B044 in the [facility] box to learn more about \"Week 38 of Your Pregnancy: Care Instructions. \"     If you do not have an account, please click on the \"Sign Up Now\" link. Current as of: February 11, 2020               Content Version: 12.6  © 3789-4178 [employer], [facility]. Care instructions adapted under license by [facility] ([facility]). If you have questions about a medical condition or this instruction, always ask your healthcare professional. [place] [address] any warranty or liability for your use of this information.

## 2021-01-18 ENCOUNTER — ROUTINE PRENATAL (OUTPATIENT)
Dept: OBGYN CLINIC | Age: 26
End: 2021-01-18

## 2021-01-18 VITALS
HEART RATE: 75 BPM | DIASTOLIC BLOOD PRESSURE: 78 MMHG | SYSTOLIC BLOOD PRESSURE: 111 MMHG | BODY MASS INDEX: 33.09 KG/M2 | WEIGHT: 205 LBS

## 2021-01-18 DIAGNOSIS — Z3A.38 38 WEEKS GESTATION OF PREGNANCY: ICD-10-CM

## 2021-01-18 DIAGNOSIS — O34.219 HX SUCCESSFUL VBAC (VAGINAL BIRTH AFTER CESAREAN), CURRENTLY PREGNANT: ICD-10-CM

## 2021-01-18 DIAGNOSIS — Z34.83 ENCOUNTER FOR SUPERVISION OF NORMAL PREGNANCY IN MULTIGRAVIDA IN THIRD TRIMESTER: Primary | ICD-10-CM

## 2021-01-18 DIAGNOSIS — O26.843 UTERINE SIZE DATE DISCREPANCY PREGNANCY, THIRD TRIMESTER: ICD-10-CM

## 2021-01-18 PROCEDURE — 99213 OFFICE O/P EST LOW 20 MIN: CPT | Performed by: OBSTETRICS & GYNECOLOGY

## 2021-01-18 NOTE — PATIENT INSTRUCTIONS
Patient Education        Week 39 of Your Pregnancy: Care Instructions  Your Care Instructions     During these final weeks, you may feel anxious to see your new baby.  babies often look different from what you see in pictures or movies. Right after birth, their heads may have a strange shape. Their eyes may be puffy. And their genitals may be swollen. They may also have very dry skin, or red marks on the eyelids, nose, or neck. Still, most parents think their babies are beautiful. Follow-up care is a key part of your treatment and safety. Be sure to make and go to all appointments, and call your doctor if you are having problems. It's also a good idea to know your test results and keep a list of the medicines you take. How can you care for yourself at home? Prepare to breastfeed  · If you are breastfeeding, continue to eat healthy foods. · If your health care provider recommends it, keep taking your prenatal vitamins. · Talk to your doctor before you take any medicine or supplement. That's because some medicines can affect your breast milk. · You can help prevent sore nipples if you feed your baby in the correct position. Nurses will help you learn to do this. Choose the right birth control after your baby is born  · Women who are breastfeeding can still get pregnant. Use birth control if you don't want to get pregnant. · Intrauterine devices (IUDs) are very effective at preventing pregnancy and can provide birth control for 3 to 10 years, depending on the type. If you talk with your doctor before having your baby, the IUD can be placed right after you give birth. If you decide you want to get pregnant later, you can have it removed. They are safe to use while you are breastfeeding. · A hormonal implant is also very effective at preventing pregnancy. It is placed under the skin of the arm. This can be done right after you give birth.  It releases the hormone progestin and prevents pregnancy for about 3 years. This can also be removed by a doctor at any time. It is safe to use while you are breastfeeding. · Depo-Provera can be used while you are breastfeeding. It is a shot you get every 3 months. · Birth control pills work well. But you need a different kind of pill for the first few weeks after giving birth. And when you start taking these pills, you need to make sure to use another type of birth control for 7 days after you start your pack. · Diaphragms, cervical caps, and condoms with spermicide work less well after birth. If you have a diaphragm or cervical cap, talk to your doctor to see if you need a different size. · Tubal ligation (tying your tubes) and vasectomy are both permanent. These are good options if you are sure you are done having children. Where can you learn more? Go to [email or web address] and sign in to your [employer] account. Enter Z288 in the [employer] box to learn more about \"Week 39 of Your Pregnancy: Care Instructions. \"     If you do not have an account, please click on the \"Sign Up Now\" link. Current as of: February 11, 2020               Content Version: 12.6  © 7457-0351 [employer]. Care instructions adapted under license by [facility] ([facility]). If you have questions about a medical condition or this instruction, always ask your healthcare professional. [address]  any warranty or liability for your use of this information.

## 2021-01-18 NOTE — PROGRESS NOTES
Pt is here for prenatal appointment. She denies spotting, cramping, contractions. She has been having some pink mucus.

## 2021-01-18 NOTE — PROGRESS NOTES
Effie Ramirez is a 31 y.o. female 38w6d who presents for routine prenatal visit. The patient was seen and evaluated. There was positive fetal movements. No contractions, bleeding or leakage of fluid. Signs and symptoms of  labor as well as labor were reviewed. The S/S of Pre-Eclampsia were reviewed with the patient in detail. She is to report any of these if they occur. She currently denies any of these. Fabiola Johansen is a 31 y.o. female with the following history as recorded in [facility]:  Patient Active Problem List    Diagnosis Date Noted    Anemia 12/10/2020    Incomplete       Current Outpatient Medications   Medication Sig Dispense Refill    ferrous sulfate (IRON 325) 325 (65 Fe) MG tablet Take 1 tablet by mouth 2 times daily 60 tablet 5    docusate sodium (COLACE) 100 MG capsule Take 1 capsule by mouth 3 times daily as needed for Constipation 90 capsule 3    Prenatal MV-Min-Fe Fum-FA-DHA (PRENATAL 1 PO) Take by mouth      ferric carboxymaltose (INJECTAFER) 750 MG/15ML SOLN IV solution Infuse 15 mLs intravenously every 7 days for 2 doses 30 mL 0    doxylamine-pyridoxine 10-10 MG TBEC Take 2 tabs PO qhs. If symptoms persists after 2 days increase to 1 tab PO qam and 2 tabs PO qhs (Patient not taking: Reported on 2020) 60 tablet 3     No current facility-administered medications for this visit. Allergies: Patient has no known allergies.   Past Medical History:   Diagnosis Date    Anemia     Miscarriage      Past Surgical History:   Procedure Laterality Date     SECTION N/A 2017     SECTION performed by [facility]warya Pedro MD at [facility] L[facility]D OR    DILATION AND CURETTAGE OF UTERUS N/A 2020    DILATATION AND CURETTAGE SUCTION performed by Cheryl Garland MD at [facility]      as an infant    CT REPAIR ENTEROCELE, W Beti Espinal N/A 2018    VAGINAL CANAL REPAIR performed by Cheryl Garland MD at [facility] reviewed. No pertinent family history. Social History     Tobacco Use    Smoking status: Never Smoker    Smokeless tobacco: Never Used   Substance Use Topics    Alcohol use: No     Alcohol/week: 0.0 standard drinks         Mother's Prenatal Vitals  BP: 111/78  Weight: 205 lb (93 kg)  Pulse: 75  Patient Position: Sitting  Prenatal Fetal Information  Fundal Height (cm): 42 cm  Fetal Heart Rate: 148  Movement: Present  Dil/Eff/Sta  Dilation (cm): 4  Effacement (%): 80  Station: -2  Physical Exam  Constitutional:       General: She is not in acute distress. Appearance: Normal appearance. She is not ill-appearing, toxic-appearing or diaphoretic. HENT:      Head: Normocephalic and atraumatic. Eyes:      Extraocular Movements: Extraocular movements intact. Pulmonary:      Effort: Pulmonary effort is normal. No respiratory distress. Abdominal:      Palpations: Abdomen is soft. Tenderness: There is no abdominal tenderness. Musculoskeletal: Normal range of motion. General: No swelling or tenderness. Right lower leg: No edema. Left lower leg: No edema. Skin:     General: Skin is warm and dry. Findings: No rash. Neurological:      Mental Status: She is alert and oriented to person, place, and time. Psychiatric:         Attention and Perception: Attention and perception normal.         Mood and Affect: Mood and affect normal.         Speech: Speech normal.         Behavior: Behavior normal. Behavior is cooperative. Thought Content: Thought content normal.         Cognition and Memory: Cognition and memory normal.         Judgment: Judgment normal.           The patient had her 28 week labs completed. T-Dap Vaccine (27-36 weeks): completed    The patient was instructed on fetal kick counts and was given a kick sheet to complete every 8 hours.  She was instructed that the baby should move at a minimum of ten times within one hour after a meal. The patient was instructed to lay down on her left side twenty minutes after eating and count movements for up to one hour with a target value of ten movements. She was instructed to notify the office if she did not make that target after two attempts or if after any attempt there was less than four movements. The patient reports that the targets have been made Yes. Assessment:   Diagnosis Orders   1. Encounter for supervision of normal pregnancy in multigravida in third trimester     2. 38 weeks gestation of pregnancy     3. Hx successful  (vaginal birth after ), currently pregnant     4. Uterine size date discrepancy pregnancy, third trimester               Plan:  1. Labor precautions   2. Induction scheduled for 21 at 5 am  I Queenie Santa, am scribing for and in the presence of Dr. Rohan Steinberg. I, Dr. Rohan Steinberg, personally performed the services described in this documentation as scribed by Queenie Santa in my presence, and it is both accurate and complete.

## 2021-01-21 ENCOUNTER — ANESTHESIA EVENT (OUTPATIENT)
Dept: MOTHER INFANT UNIT | Age: 26
End: 2021-01-21

## 2021-01-21 ENCOUNTER — HOSPITAL ENCOUNTER (INPATIENT)
Age: 26
LOS: 3 days | Discharge: HOME | End: 2021-01-24
Attending: OBSTETRICS & GYNECOLOGY | Admitting: OBSTETRICS & GYNECOLOGY

## 2021-01-21 ENCOUNTER — ANESTHESIA (OUTPATIENT)
Dept: LABOR AND DELIVERY | Age: 26
End: 2021-01-21

## 2021-01-21 ENCOUNTER — ANESTHESIA (OUTPATIENT)
Dept: MOTHER INFANT UNIT | Age: 26
End: 2021-01-21

## 2021-01-21 ENCOUNTER — ANESTHESIA EVENT (OUTPATIENT)
Dept: LABOR AND DELIVERY | Age: 26
End: 2021-01-21

## 2021-01-21 PROBLEM — Z34.90 TERM PREGNANCY: Status: ACTIVE | Noted: 2021-01-21

## 2021-01-21 LAB
ABO/RH: NORMAL
AMPHETAMINE SCREEN, URINE: NEGATIVE
ANTIBODY SCREEN: NORMAL
BARBITURATE SCREEN URINE: NEGATIVE
BENZODIAZEPINE SCREEN, URINE: NEGATIVE
CANNABINOID SCREEN URINE: NEGATIVE
COCAINE METABOLITE SCREEN URINE: NEGATIVE
HCT VFR BLD CALC: 36.2 % (ref 37–47)
HEMOGLOBIN: 11.8 G/DL (ref 12–16)
Lab: NORMAL
MCH RBC QN AUTO: 28.6 PG (ref 27–31)
MCHC RBC AUTO-ENTMCNC: 32.6 G/DL (ref 33–37)
MCV RBC AUTO: 87.9 FL (ref 81–99)
OPIATE SCREEN URINE: NEGATIVE
PDW BLD-RTO: 18 % (ref 11.5–14.5)
PLATELET # BLD: 117 K/UL (ref 130–400)
PMV BLD AUTO: 10.7 FL (ref 9.4–12.3)
RBC # BLD: 4.12 M/UL (ref 4.2–5.4)
RPR: NORMAL
WBC # BLD: 7.2 K/UL (ref 4.8–10.8)

## 2021-01-21 PROCEDURE — 3700000001 HC ADD 15 MINUTES (ANESTHESIA)

## 2021-01-21 PROCEDURE — 62273 INJECT EPIDURAL PATCH: CPT | Performed by: NURSE ANESTHETIST, CERTIFIED REGISTERED

## 2021-01-21 PROCEDURE — 6360000002 HC RX W HCPCS: Performed by: OBSTETRICS & GYNECOLOGY

## 2021-01-21 PROCEDURE — 3E033VJ INTRODUCTION OF OTHER HORMONE INTO PERIPHERAL VEIN, PERCUTANEOUS APPROACH: ICD-10-PCS | Performed by: OBSTETRICS & GYNECOLOGY

## 2021-01-21 PROCEDURE — 6370000000 HC RX 637 (ALT 250 FOR IP): Performed by: OBSTETRICS & GYNECOLOGY

## 2021-01-21 PROCEDURE — 2500000003 HC RX 250 WO HCPCS: Performed by: NURSE ANESTHETIST, CERTIFIED REGISTERED

## 2021-01-21 PROCEDURE — 85027 COMPLETE CBC AUTOMATED: CPT

## 2021-01-21 PROCEDURE — 36415 COLL VENOUS BLD VENIPUNCTURE: CPT

## 2021-01-21 PROCEDURE — 6360000002 HC RX W HCPCS: Performed by: NURSE ANESTHETIST, CERTIFIED REGISTERED

## 2021-01-21 PROCEDURE — 1220000000 HC SEMI PRIVATE OB R&B

## 2021-01-21 PROCEDURE — 62273 INJECT EPIDURAL PATCH: CPT

## 2021-01-21 PROCEDURE — 10907ZC DRAINAGE OF AMNIOTIC FLUID, THERAPEUTIC FROM PRODUCTS OF CONCEPTION, VIA NATURAL OR ARTIFICIAL OPENING: ICD-10-PCS | Performed by: OBSTETRICS & GYNECOLOGY

## 2021-01-21 PROCEDURE — 80307 DRUG TEST PRSMV CHEM ANLYZR: CPT

## 2021-01-21 PROCEDURE — 88307 TISSUE EXAM BY PATHOLOGIST: CPT

## 2021-01-21 PROCEDURE — 2580000003 HC RX 258: Performed by: OBSTETRICS & GYNECOLOGY

## 2021-01-21 PROCEDURE — 3700000025 EPIDURAL BLOCK: Performed by: NURSE ANESTHETIST, CERTIFIED REGISTERED

## 2021-01-21 PROCEDURE — 86901 BLOOD TYPING SEROLOGIC RH(D): CPT

## 2021-01-21 PROCEDURE — 86592 SYPHILIS TEST NON-TREP QUAL: CPT

## 2021-01-21 PROCEDURE — 86900 BLOOD TYPING SEROLOGIC ABO: CPT

## 2021-01-21 PROCEDURE — 3700000000 HC ANESTHESIA ATTENDED CARE

## 2021-01-21 PROCEDURE — 86850 RBC ANTIBODY SCREEN: CPT

## 2021-01-21 PROCEDURE — 7200000001 HC VAGINAL DELIVERY

## 2021-01-21 RX ORDER — ROPIVACAINE HYDROCHLORIDE 2 MG/ML
INJECTION, SOLUTION EPIDURAL; INFILTRATION; PERINEURAL CONTINUOUS PRN
Status: DISCONTINUED | OUTPATIENT
Start: 2021-01-21 | End: 2021-01-21 | Stop reason: SDUPTHER

## 2021-01-21 RX ORDER — OXYCODONE HYDROCHLORIDE AND ACETAMINOPHEN 5; 325 MG/1; MG/1
1 TABLET ORAL EVERY 4 HOURS PRN
Status: DISCONTINUED | OUTPATIENT
Start: 2021-01-21 | End: 2021-01-24 | Stop reason: HOSPADM

## 2021-01-21 RX ORDER — ROPIVACAINE HYDROCHLORIDE 2 MG/ML
INJECTION, SOLUTION EPIDURAL; INFILTRATION; PERINEURAL PRN
Status: DISCONTINUED | OUTPATIENT
Start: 2021-01-21 | End: 2021-01-21 | Stop reason: SDUPTHER

## 2021-01-21 RX ORDER — FENTANYL CITRATE 50 UG/ML
INJECTION, SOLUTION INTRAMUSCULAR; INTRAVENOUS PRN
Status: DISCONTINUED | OUTPATIENT
Start: 2021-01-21 | End: 2021-01-21 | Stop reason: SDUPTHER

## 2021-01-21 RX ORDER — CARBOPROST TROMETHAMINE 250 UG/ML
250 INJECTION, SOLUTION INTRAMUSCULAR
Status: ACTIVE | OUTPATIENT
Start: 2021-01-21 | End: 2021-01-21

## 2021-01-21 RX ORDER — SODIUM CHLORIDE 0.9 % (FLUSH) 0.9 %
10 SYRINGE (ML) INJECTION EVERY 12 HOURS SCHEDULED
Status: DISCONTINUED | OUTPATIENT
Start: 2021-01-21 | End: 2021-01-24 | Stop reason: HOSPADM

## 2021-01-21 RX ORDER — DOCUSATE SODIUM 100 MG/1
100 CAPSULE, LIQUID FILLED ORAL 2 TIMES DAILY
Status: DISCONTINUED | OUTPATIENT
Start: 2021-01-21 | End: 2021-01-24 | Stop reason: HOSPADM

## 2021-01-21 RX ORDER — SODIUM CHLORIDE, SODIUM LACTATE, POTASSIUM CHLORIDE, CALCIUM CHLORIDE 600; 310; 30; 20 MG/100ML; MG/100ML; MG/100ML; MG/100ML
INJECTION, SOLUTION INTRAVENOUS CONTINUOUS
Status: DISCONTINUED | OUTPATIENT
Start: 2021-01-21 | End: 2021-01-24 | Stop reason: HOSPADM

## 2021-01-21 RX ORDER — SODIUM CHLORIDE 0.9 % (FLUSH) 0.9 %
10 SYRINGE (ML) INJECTION PRN
Status: DISCONTINUED | OUTPATIENT
Start: 2021-01-21 | End: 2021-01-24 | Stop reason: HOSPADM

## 2021-01-21 RX ORDER — METHYLERGONOVINE MALEATE 0.2 MG/ML
200 INJECTION INTRAVENOUS
Status: ACTIVE | OUTPATIENT
Start: 2021-01-21 | End: 2021-01-21

## 2021-01-21 RX ORDER — MISOPROSTOL 200 UG/1
800 TABLET ORAL PRN
Status: DISCONTINUED | OUTPATIENT
Start: 2021-01-21 | End: 2021-01-24 | Stop reason: HOSPADM

## 2021-01-21 RX ORDER — ONDANSETRON 2 MG/ML
4 INJECTION INTRAMUSCULAR; INTRAVENOUS EVERY 6 HOURS PRN
Status: DISCONTINUED | OUTPATIENT
Start: 2021-01-21 | End: 2021-01-24 | Stop reason: HOSPADM

## 2021-01-21 RX ORDER — LIDOCAINE HYDROCHLORIDE AND EPINEPHRINE 15; 5 MG/ML; UG/ML
INJECTION, SOLUTION EPIDURAL PRN
Status: DISCONTINUED | OUTPATIENT
Start: 2021-01-21 | End: 2021-01-21 | Stop reason: SDUPTHER

## 2021-01-21 RX ORDER — IBUPROFEN 400 MG/1
800 TABLET ORAL EVERY 8 HOURS PRN
Status: DISCONTINUED | OUTPATIENT
Start: 2021-01-21 | End: 2021-01-24 | Stop reason: HOSPADM

## 2021-01-21 RX ORDER — OXYCODONE HYDROCHLORIDE AND ACETAMINOPHEN 5; 325 MG/1; MG/1
2 TABLET ORAL EVERY 4 HOURS PRN
Status: DISCONTINUED | OUTPATIENT
Start: 2021-01-21 | End: 2021-01-24 | Stop reason: HOSPADM

## 2021-01-21 RX ORDER — SODIUM CHLORIDE, SODIUM LACTATE, POTASSIUM CHLORIDE, AND CALCIUM CHLORIDE .6; .31; .03; .02 G/100ML; G/100ML; G/100ML; G/100ML
1000 INJECTION, SOLUTION INTRAVENOUS ONCE
Status: COMPLETED | OUTPATIENT
Start: 2021-01-21 | End: 2021-01-21

## 2021-01-21 RX ORDER — LIDOCAINE HYDROCHLORIDE 10 MG/ML
INJECTION, SOLUTION EPIDURAL; INFILTRATION; INTRACAUDAL; PERINEURAL PRN
Status: DISCONTINUED | OUTPATIENT
Start: 2021-01-21 | End: 2021-01-21 | Stop reason: SDUPTHER

## 2021-01-21 RX ADMIN — ROPIVACAINE HYDROCHLORIDE 3 ML: 2 INJECTION, SOLUTION EPIDURAL; INFILTRATION at 12:55

## 2021-01-21 RX ADMIN — LIDOCAINE HYDROCHLORIDE 10 MG: 10 INJECTION, SOLUTION EPIDURAL; INFILTRATION; INTRACAUDAL; PERINEURAL at 12:46

## 2021-01-21 RX ADMIN — SODIUM CHLORIDE, POTASSIUM CHLORIDE, SODIUM LACTATE AND CALCIUM CHLORIDE: 600; 310; 30; 20 INJECTION, SOLUTION INTRAVENOUS at 05:40

## 2021-01-21 RX ADMIN — Medication 1 MILLI-UNITS/MIN: at 05:56

## 2021-01-21 RX ADMIN — LIDOCAINE HYDROCHLORIDE AND EPINEPHRINE 3 ML: 15; 5 INJECTION, SOLUTION EPIDURAL at 12:49

## 2021-01-21 RX ADMIN — ROPIVACAINE HYDROCHLORIDE 3 ML: 2 INJECTION, SOLUTION EPIDURAL; INFILTRATION at 12:53

## 2021-01-21 RX ADMIN — FENTANYL CITRATE 25 MCG: 50 INJECTION INTRAMUSCULAR; INTRAVENOUS at 21:23

## 2021-01-21 RX ADMIN — IBUPROFEN 800 MG: 400 TABLET ORAL at 21:07

## 2021-01-21 RX ADMIN — ROPIVACAINE HYDROCHLORIDE 10 ML/HR: 2 INJECTION, SOLUTION EPIDURAL; INFILTRATION; PERINEURAL at 12:56

## 2021-01-21 RX ADMIN — SODIUM CHLORIDE, POTASSIUM CHLORIDE, SODIUM LACTATE AND CALCIUM CHLORIDE 1000 ML: 600; 310; 30; 20 INJECTION, SOLUTION INTRAVENOUS at 12:20

## 2021-01-21 RX ADMIN — FENTANYL CITRATE 100 MCG: 50 INJECTION INTRAMUSCULAR; INTRAVENOUS at 13:17

## 2021-01-21 RX ADMIN — FENTANYL CITRATE 25 MCG: 50 INJECTION INTRAMUSCULAR; INTRAVENOUS at 21:21

## 2021-01-21 NOTE — ANESTHESIA POSTPROCEDURE EVALUATION
Department of Anesthesiology  Postprocedure Note    Patient: Lima Nugent  MRN: [id number]  YOB: 1995  Date of evaluation: 1/21/2021  Time:  3:16 PM     Procedure Summary     Date: 01/21/21 Room / Location:     Anesthesia Start: [address] Anesthesia Stop: 1343    Procedure: Labor Analgesia Diagnosis:     Scheduled Providers:  Responsible Provider: KAIA Montes CRNA    Anesthesia Type: epidural ASA Status: 2          Anesthesia Type: epidural    Tru Phase I:      Tru Phase II:      Last vitals: Reviewed and per EMR flowsheets.        Anesthesia Post Evaluation    Patient location during evaluation: bedside  Patient participation: complete - patient participated  Level of consciousness: awake and alert  Pain score: 0  Airway patency: patent  Nausea & Vomiting: no nausea and no vomiting  Complications: no  Cardiovascular status: hemodynamically stable  Respiratory status: acceptable  Hydration status: stable

## 2021-01-21 NOTE — ANESTHESIA PROCEDURE NOTES
Epidural Block    Patient location during procedure: OB  Start time: 1/21/2021 12:42 PM  End time: 1/21/2021 12:48 PM  Reason for block: labor epidural  Staffing  Performed: resident/CRNA   Resident/CRNA: KAIA Mcnair CRNA  Preanesthetic Checklist  Completed: patient identified, IV checked, site marked, risks and benefits discussed, surgical consent, monitors and equipment checked, pre-op evaluation, timeout performed, anesthesia consent given, oxygen available and patient being monitored  Epidural  Patient position: sitting  Prep: Betadine  Patient monitoring: continuous pulse ox, cardiac monitor and frequent blood pressure checks  Approach: midline  Location: lumbar (1-5)  Injection technique: GEORGINA saline  Guidance: paresthesia technique  Provider prep: mask and sterile gloves  Needle  Needle type: Tuohy   Needle gauge: 17 G  Needle length: 3.5 in  Needle insertion depth: 6 cm  Catheter type: end hole  Catheter size: 19 G  Catheter at skin depth: 12 cm  Test dose: negative

## 2021-01-21 NOTE — ANESTHESIA PRE PROCEDURE
Department of Anesthesiology  Preprocedure Note       Name:  Jignesh Ramos   Age:  31 y.o.  :  1995                                          MRN:  [id number]         Date:  2021      Surgeon: * No surgeons listed *    Procedure: * No procedures listed *    Medications prior to admission:   Prior to Admission medications    Medication Sig Start Date End Date Taking? Authorizing Provider   ferrous sulfate (IRON 325) 325 (65 Fe) MG tablet Take 1 tablet by mouth 2 times daily 20  Yes Tamar Smith MD   docusate sodium (COLACE) 100 MG capsule Take 1 capsule by mouth 3 times daily as needed for Constipation 20  Yes Anita Costello MD   Prenatal MV-Min-Fe Fum-FA-DHA (PRENATAL 1 PO) Take by mouth   Yes Historical Provider, MD   ferric carboxymaltose (INJECTAFER) 750 MG/15ML SOLN IV solution Infuse 15 mLs intravenously every 7 days for 2 doses 20  Michaelle Smith MD   doxylamine-pyridoxine 10-10 MG TBEC Take 2 tabs PO qhs.   If symptoms persists after 2 days increase to 1 tab PO qam and 2 tabs PO qhs  Patient not taking: Reported on 2020   Anita Costello MD       Current medications:    Current Facility-Administered Medications   Medication Dose Route Frequency Provider Last Rate Last Admin    lactated ringers infusion   Intravenous Continuous Anita Costello  mL/hr at 21 0540 New Bag at 21 0540    sodium chloride flush 0.9 % injection 10 mL  10 mL Intravenous 2 times per day Anita Costello MD        sodium chloride flush 0.9 % injection 10 mL  10 mL Intravenous PRN Anita Costello MD        oxytocin (PITOCIN) 10 unit bolus from the bag  10 Units Intravenous PRN Anita Costello MD        And    oxytocin (PITOCIN) 30 units in 500 mL infusion  87.3 mehreen-units/min Intravenous PRN Anita Costello MD        [facility]ron [facility]) injection 4 mg  4 mg Intravenous Q6H PRN Anita Costello MD  butorphanol (STADOL) injection 1 mg  1 mg Intravenous Q3H PRN Evan Cortez MD        oxytocin (PITOCIN) 30 units in 500 mL infusion  1-20 mehreen-units/min Intravenous Continuous Evan Cortez MD 8 mL/hr at 21 1116 8 mehreen-units/min at 21 1116    lactated ringers bolus  1,000 mL Intravenous Once Evan Cortez MD           Allergies:  No Known Allergies    Problem List:    Patient Active Problem List   Diagnosis Code    Incomplete  O03.4    Anemia D64.9    Term pregnancy Z34.90       Past Medical History:        Diagnosis Date    Anemia     Miscarriage        Past Surgical History:        Procedure Laterality Date     SECTION N/A 2017     SECTION performed by Sourav Snow MD at [facility] L&D OR    DILATION AND CURETTAGE OF UTERUS N/A 2020    DILATATION AND CURETTAGE SUCTION performed by Evan Cortez MD at [address] [place] [address]      as an infant    CA REPAIR ENTEROCELE, [address] N/A 2018    VAGINAL CANAL REPAIR performed by Evan Cortez MD at [place] [address] History:    Social History     Tobacco Use    Smoking status: Never Smoker    Smokeless tobacco: Never Used   Substance Use Topics    Alcohol use: No     Alcohol/week: 0.0 standard drinks                                Counseling given: Not Answered      Vital Signs (Current):   Vitals:    21 0533 21 0636 21 0831 21 1136   BP:  107/67 112/68    Pulse:  70 66    Resp:  14     Temp:  96.7 °F (35.9 °C)  98.7 °F (37.1 °C)   TempSrc:  Temporal  Temporal   Weight: 205 lb (93 kg)      Height: 5' 6\" (1.676 m)                                                 BP Readings from Last 3 Encounters:   21 112/68   21 111/78   21 105/64       NPO Status: Time of last liquid consumption: 0500                        Time of last solid consumption: 2200                        Date of last liquid consumption: 21 Plan discussed with CRNA.                   KAIA Rod - ZOILA   1/21/2021

## 2021-01-22 LAB
ABO/RH: NORMAL
FETAL SCREEN: NORMAL
HCT VFR BLD CALC: 34.1 % (ref 37–47)
HEMOGLOBIN: 10.8 G/DL (ref 12–16)
MCH RBC QN AUTO: 28.3 PG (ref 27–31)
MCHC RBC AUTO-ENTMCNC: 31.7 G/DL (ref 33–37)
MCV RBC AUTO: 89.5 FL (ref 81–99)
PDW BLD-RTO: 17.9 % (ref 11.5–14.5)
PLATELET # BLD: 125 K/UL (ref 130–400)
PMV BLD AUTO: 11.1 FL (ref 9.4–12.3)
RBC # BLD: 3.81 M/UL (ref 4.2–5.4)
RHIG LOT NUMBER: NORMAL
WBC # BLD: 9.4 K/UL (ref 4.8–10.8)

## 2021-01-22 PROCEDURE — 85027 COMPLETE CBC AUTOMATED: CPT

## 2021-01-22 PROCEDURE — 6360000002 HC RX W HCPCS: Performed by: OBSTETRICS & GYNECOLOGY

## 2021-01-22 PROCEDURE — 86901 BLOOD TYPING SEROLOGIC RH(D): CPT

## 2021-01-22 PROCEDURE — 85461 HEMOGLOBIN FETAL: CPT

## 2021-01-22 PROCEDURE — 6370000000 HC RX 637 (ALT 250 FOR IP): Performed by: OBSTETRICS & GYNECOLOGY

## 2021-01-22 PROCEDURE — 86900 BLOOD TYPING SEROLOGIC ABO: CPT

## 2021-01-22 PROCEDURE — 1220000000 HC SEMI PRIVATE OB R&B

## 2021-01-22 PROCEDURE — 99233 SBSQ HOSP IP/OBS HIGH 50: CPT | Performed by: NURSE PRACTITIONER

## 2021-01-22 PROCEDURE — 36415 COLL VENOUS BLD VENIPUNCTURE: CPT

## 2021-01-22 RX ADMIN — IBUPROFEN 800 MG: 400 TABLET ORAL at 20:43

## 2021-01-22 RX ADMIN — IBUPROFEN 800 MG: 400 TABLET ORAL at 12:58

## 2021-01-22 RX ADMIN — OXYCODONE AND ACETAMINOPHEN 1 TABLET: 5; 325 TABLET ORAL at 20:48

## 2021-01-22 RX ADMIN — HUMAN RHO(D) IMMUNE GLOBULIN 300 MCG: 300 INJECTION, SOLUTION INTRAMUSCULAR at 13:53

## 2021-01-22 RX ADMIN — DOCUSATE SODIUM 100 MG: 100 CAPSULE, LIQUID FILLED ORAL at 07:53

## 2021-01-22 RX ADMIN — DOCUSATE SODIUM 100 MG: 100 CAPSULE, LIQUID FILLED ORAL at 20:48

## 2021-01-22 ASSESSMENT — PAIN SCALES - GENERAL
PAINLEVEL_OUTOF10: 5
PAINLEVEL_OUTOF10: 3

## 2021-01-22 NOTE — L&D DELIVERY NOTE
Mother's Information    Labor Events     labor?: No  Rupture type: Artificial=AROM, Intact  Fluid color: Bloody Show  Fluid odor: None     Mother Delivery Information    Episiotomy: None  Lacerations: None  Repair Suture: None  Surgical or Additional Est. Blood Loss (mL): 0 (View Only): Edit in Flowsheets   Combined Est. Blood Loss (mL): 0        [place], [facility] [[id number]]    Labor Events     labor?: No   steroids?: None  Cervical ripening date/time:     Antibiotics received during labor?: No  Rupture Identifier: Sac 1   Rupture date/time: 21 11:32:00   Rupture type: Artificial=AROM, Intact  Fluid color: Bloody Show, Clear  Fluid odor: None  Induction: Oxytocin, AROM  Indications for induction: Elective  Augmentation: None  Labor complications: None          Labor Event Times    Labor onset date/time: 21 0544 CST   Dilation complete date/time:  21 1311 CST   Start pushin2021 1336   Decision time (emergent ):        Anesthesia    Method: Epidural     Assisted Delivery Details    Forceps attempted?: No  Vacuum extractor attempted?: No     Document Additional Attempt       Document Additional Attempt             Shoulder Dystocia    Shoulder dystocia present?: No  Add Second Maneuver  Add Third Maneuver  Add Fourth Maneuver  Add Fifth Maneuver  Add Sixth Maneuver  Add Seventh Maneuver  Add Eighth Maneuver  Add Ninth Maneuver     [place] Presentation    Presentation: Vertex  Position: Left  _: Occiput  _: Anterior     [place] Information    Head delivery date/time: 2021 13:43:00   Changing the 's delivery date/time could affect patient care.:    Delivery date/time:  21 1343   Delivery type: Vaginal, Spontaneous    Details:        Delivery Providers    Delivering clinician: Erick Muse MD   Provider Role    Uche Abrams, RN Delivery Nurse    Gm Singh, [address] Respiratory Therapist (Day)    Yolie Fields Surgical Tech Malissa Manjarrez, RN Registered Nurse    Beth Fragoso, RN Registered Nurse      Cord    Vessels: 3 Vessels  Complications: None  Delayed cord clamping?: Yes  Cord clamped date/time: 2021 1344  Cord blood disposition: Lab  Gases sent?: No  Stem cell collection (by provider): No     Placenta    Date/time: 2021 14:00:00  Removal: Spontaneous  Appearance: Adherent  Disposition: Pathology     Delivery Resuscitation    Method: Bulb Suction, Stimulation, CPAP, Suctioning     Apgars    Living status: Living  Apgars   1 Minute:  5 Minute:  10 Minute 15 Minute 20 Minute   Skin Color: 1  1       Heart Rate: 2  2       Reflex Irritability: 2  2       Muscle Tone: 2  2       Respiratory Effort: 1  2       Total: 8  9               Apgars Assigned By: Juan Santo     Skin to Skin    Skin to skin initiation date/time: 21 13:53:00   Skin to skin with: Mother  Skin to skin end date/time:     Reason skin to skin not initiated:  Acuity      Measurements    Weight: 3920 g Length: 52.7 cm   Head circumference: 34.3 cm       Delivery Information    Episiotomy: None  Perineal lacerations: None    Vaginal laceration: No    Cervical laceration: No    Surgical or additional est. blood loss (mL): 0 (View Only): Edit in Flowsheets   Combined est. blood loss (mL): 0  Repair suture: None     Vaginal Delivery Counts    Initial count personnel: OLU HIGUERA  Initial count verified by: IHSAN SHORT   4x4:  Needles:  Instruments:  Lap Pads:  Sponges:    Initial counts:         Final counts:         Final count personnel: Landon Whelan  Final count verified by: IHSAN SHORT  Accurate final count?: Yes  Final vaginal sweep completed: Yes     Other Procedures    Procedures: None     Labor Length    1st stage: 7h 27m  2nd stage: 0h 32m  3rd stage: 0h 17m          Upon removal of placenta there was noted to be one area that did not appear to detach completely. Uterus manually explored and a large cotyledon removed.   The anterior wall felt irregular but no further tissue able to be extracted.   Bleeding normal.  Will continue to monitor expectantly

## 2021-01-22 NOTE — ANESTHESIA PROCEDURE NOTES
Epidural Blood Patch    Patient location during procedure: OB  Start time: 1/21/2021 9:04 PM  End time: 1/21/2021 9:10 PM  Reason for block: epidural blood patch  Staffing  Performed: resident/CRNA   Resident/CRNA: KAIA Bishop CRNA  Preanesthetic Checklist  Completed: patient identified, IV checked, site marked, risks and benefits discussed, surgical consent, monitors and equipment checked, pre-op evaluation, timeout performed, anesthesia consent given, oxygen available and patient being monitored  Epidural  Patient position: sitting  Prep: Betadine  Patient monitoring: cardiac monitor and continuous pulse ox  Approach: midline  Location: lumbar (1-5)  Injection technique: GEORGINA saline  Guidance: paresthesia technique  Provider prep: mask and sterile gloves  Needle  Needle type: Tuohy   Needle gauge: 17 G  Needle length: 3.5 in  Needle insertion depth: 5 cm  Catheter type: end hole  Assessment  Attempts: 1  Additional Notes  INJECTED 6ML OF BLOOD INTO EPIDURAL SPACE. AT 6ML PATIENT C/O PRESSURE AND MILD DISCOMFORT IN BETWEEN SHOULDER BLADES. PT STATED HER HEADACHE WAS GONE AND SHE FELT PRESSURE IN HER BACK. APPROX. 10 MIN AFTER EBP PT COMPLAINED OF MILD HEADACHE RETURN. Blood Patch  Location of venous blood draw: arm  Venous blood amount injected (mL): 6mL.

## 2021-01-22 NOTE — LACTATION NOTE
Infant Name: Baby Boy  Gestation: 39.2  Day of Life: 1  Birth weight: 8-10.3 lb (3920g)  Today's weight: 8-6 lb (3799g)  Weight loss: -3%  24 hour summary of feeds: Breastfeeding x 3, Attempt x 2  Voids: 0  Stools: 2  Assistive device: none  Maternal History: , anemia, Hernia repair,  section  Breastfeeding history: yes, longest duration 18 months. Maternal Medications: injectafer, ferrous sulfate, colace  Maternal Goal: one day at a time  Breast pump for home: yes. Instructed mother to breastfeed every 2- 3 hours for 15-20 mins each side or on demand watching for hunger cues and using waking techniques when needed. 8-12 feedings in 24 hours being the goal. Hand expression and breast compressions encouraged to increase milk supply and transfer. Discussed the benefits of colostrum, skin to skin and the importance of good positioning and latch. Informed mother that baby can be very sleepy the first 24 hours and typically the 2nd night babies will be more awake and want to feed a lot and that this is normal and important in establishing milk supply. Discussed supply and demand. Mother and baby will be discharged tomorrow. Lactation will not be here. Instructions and handouts given over management of sore nipples, engorgement, plugged ducts, mastitis, hydration, nutrition, and medications that could effect milk supply. Mother knows when to call MD if needed. All questions and concerns answered at this time. Encouraged to call out for help with feeding when needed. Lactation number provided.

## 2021-01-22 NOTE — ANESTHESIA POSTPROCEDURE EVALUATION
Department of Anesthesiology  Postprocedure Note    Patient: Viraj Wild  MRN: [id number]  YOB: 1995  Date of evaluation: 1/21/2021  Time:  9:36 PM     Procedure Summary     Date: 01/21/21 Room / Location:     Anesthesia Start: 2104 Anesthesia Stop: 2136    Procedure: Epidural Blood Patch Diagnosis:     Scheduled Providers:  Responsible Provider:     Anesthesia Type: Not recorded ASA Status: Not recorded          Anesthesia Type: No value filed. Tru Phase I:      Tru Phase II:      Last vitals: Reviewed and per EMR flowsheets.        Anesthesia Post Evaluation    Patient location during evaluation: bedside  Patient participation: complete - patient participated  Level of consciousness: awake and alert  Pain score: 5  Airway patency: patent  Nausea & Vomiting: no nausea and no vomiting  Complications: no  Cardiovascular status: hemodynamically stable  Respiratory status: acceptable  Hydration status: stable

## 2021-01-22 NOTE — PROGRESS NOTES
Subjective:     Postpartum Day 1: Vaginal Delivery    Patient is a [id number] The patient feels better today. Had blood patch for headache last night. The patient denies emotional concerns. Pain is poorly controlled with current medications. The baby iswell. Baby is feeding via breast. Urinary output is adequate. The patient is ambulating well. The patient is tolerating a normal diet. Flatus has been passed. Objective:      Patient Vitals for the past 8 hrs:   BP Temp Temp src Pulse Resp SpO2   01/22/21 0643 106/62 96.9 °F (36.1 °C) Temporal 68 16 98 %   01/22/21 0208 114/67 98.2 °F (36.8 °C) Temporal 74 16 98 %     General:    alert, appears stated age and cooperative   Bowel Sounds:  active   Lochia:  appropriate   Uterine Fundus:   firm   Episiotomy:  healing well, no significant drainage, no dehiscence, no significant erythema   DVT Evaluation:  No evidence of DVT seen on physical exam.     Lab Results   Component Value Date    WBC 9.4 01/22/2021    HGB 10.8 (L) 01/22/2021    HCT 34.1 (L) 01/22/2021    MCV 89.5 01/22/2021     (L) 01/22/2021     Assessment:     Status post vaginal delivery. Doing well after blood patch last night. Plan:     Continue current care.

## 2021-01-22 NOTE — ANESTHESIA PROCEDURE NOTES
Epidural Block    Patient location during procedure: OB  Start time: 1/21/2021 9:04 PM  End time: 1/21/2021 9:10 PM  Reason for block: epidural blood patch  Staffing  Performed: resident/CRNA   Resident/CRNA: KAIA Puckett CRNA  Preanesthetic Checklist  Completed: patient identified, IV checked, site marked, risks and benefits discussed, surgical consent, monitors and equipment checked, pre-op evaluation, timeout performed, anesthesia consent given, oxygen available and patient being monitored  Epidural  Patient position: sitting  Prep: Betadine  Patient monitoring: cardiac monitor and continuous pulse ox  Approach: midline  Location: lumbar (1-5)  Injection technique: GEORGINA saline  Guidance: paresthesia technique  Provider prep: mask and sterile gloves  Needle  Needle type: Tuohy   Needle gauge: 17 G  Needle length: 3.5 in  Needle insertion depth: 5 cm  Catheter type: end hole  Additional Notes  INJECTED 6ML OF BLOOD INTO EPIDURAL SPACE. AT 6ML PATIENT C/O PRESSURE. PT STATED HER HEADACHE WAS GONE AND SHE FELT PRESSURE IN HER BACK. Blood Patch  Location of venous blood draw: arm  Venous blood amount injected (mL): 6.

## 2021-01-23 ENCOUNTER — ANESTHESIA (OUTPATIENT)
Dept: MOTHER INFANT UNIT | Age: 26
End: 2021-01-23

## 2021-01-23 ENCOUNTER — ANESTHESIA EVENT (OUTPATIENT)
Dept: MOTHER INFANT UNIT | Age: 26
End: 2021-01-23

## 2021-01-23 PROBLEM — D64.9 ANEMIA: Status: RESOLVED | Noted: 2020-12-10 | Resolved: 2021-01-23

## 2021-01-23 PROBLEM — Z34.90 TERM PREGNANCY: Status: RESOLVED | Noted: 2021-01-21 | Resolved: 2021-01-23

## 2021-01-23 PROCEDURE — 6360000002 HC RX W HCPCS: Performed by: OBSTETRICS & GYNECOLOGY

## 2021-01-23 PROCEDURE — 6370000000 HC RX 637 (ALT 250 FOR IP): Performed by: OBSTETRICS & GYNECOLOGY

## 2021-01-23 PROCEDURE — 62273 INJECT EPIDURAL PATCH: CPT

## 2021-01-23 PROCEDURE — 62273 INJECT EPIDURAL PATCH: CPT | Performed by: NURSE ANESTHETIST, CERTIFIED REGISTERED

## 2021-01-23 PROCEDURE — 3E0R3GC INTRODUCTION OF OTHER THERAPEUTIC SUBSTANCE INTO SPINAL CANAL, PERCUTANEOUS APPROACH: ICD-10-PCS | Performed by: NURSE ANESTHETIST, CERTIFIED REGISTERED

## 2021-01-23 PROCEDURE — 1220000000 HC SEMI PRIVATE OB R&B

## 2021-01-23 PROCEDURE — 2580000003 HC RX 258: Performed by: OBSTETRICS & GYNECOLOGY

## 2021-01-23 RX ORDER — KETOROLAC TROMETHAMINE 30 MG/ML
30 INJECTION, SOLUTION INTRAMUSCULAR; INTRAVENOUS EVERY 6 HOURS PRN
Status: DISCONTINUED | OUTPATIENT
Start: 2021-01-23 | End: 2021-01-24 | Stop reason: HOSPADM

## 2021-01-23 RX ORDER — KETOROLAC TROMETHAMINE 10 MG/1
10 TABLET, FILM COATED ORAL EVERY 6 HOURS PRN
Qty: 20 TABLET | Refills: 0 | Status: SHIPPED | OUTPATIENT
Start: 2021-01-23 | End: 2022-09-26

## 2021-01-23 RX ORDER — CYCLOBENZAPRINE HCL 5 MG
5 TABLET ORAL 3 TIMES DAILY PRN
Qty: 30 TABLET | Refills: 0 | Status: SHIPPED | OUTPATIENT
Start: 2021-01-23 | End: 2021-02-02

## 2021-01-23 RX ORDER — DEXTROSE, SODIUM CHLORIDE, SODIUM LACTATE, POTASSIUM CHLORIDE, AND CALCIUM CHLORIDE 5; .6; .31; .03; .02 G/100ML; G/100ML; G/100ML; G/100ML; G/100ML
INJECTION, SOLUTION INTRAVENOUS CONTINUOUS
Status: DISCONTINUED | OUTPATIENT
Start: 2021-01-23 | End: 2021-01-24 | Stop reason: HOSPADM

## 2021-01-23 RX ORDER — KETOROLAC TROMETHAMINE 30 MG/ML
30 INJECTION, SOLUTION INTRAMUSCULAR; INTRAVENOUS ONCE
Status: COMPLETED | OUTPATIENT
Start: 2021-01-23 | End: 2021-01-23

## 2021-01-23 RX ADMIN — IBUPROFEN 800 MG: 400 TABLET ORAL at 08:07

## 2021-01-23 RX ADMIN — KETOROLAC TROMETHAMINE 30 MG: 30 INJECTION, SOLUTION INTRAMUSCULAR; INTRAVENOUS at 13:00

## 2021-01-23 RX ADMIN — SODIUM CHLORIDE, SODIUM LACTATE, POTASSIUM CHLORIDE, CALCIUM CHLORIDE AND DEXTROSE MONOHYDRATE: 5; 600; 310; 30; 20 INJECTION, SOLUTION INTRAVENOUS at 18:08

## 2021-01-23 RX ADMIN — KETOROLAC TROMETHAMINE 30 MG: 30 INJECTION, SOLUTION INTRAMUSCULAR; INTRAVENOUS at 13:01

## 2021-01-23 RX ADMIN — DOCUSATE SODIUM 100 MG: 100 CAPSULE, LIQUID FILLED ORAL at 08:07

## 2021-01-23 ASSESSMENT — PAIN SCALES - GENERAL
PAINLEVEL_OUTOF10: 9
PAINLEVEL_OUTOF10: 6

## 2021-01-23 NOTE — FLOWSHEET NOTE
Assumed care of pt. Pt Siting in bed with infant and significant other in room. Assessment completed, stable. Tolerating regular diet, voiding and ambulating without difficulty. Denies pain or needs at this time.

## 2021-01-23 NOTE — FLOWSHEET NOTE
Pt states that through the night her spinal headache started coming back. Pt states she feels pressure in her head in the same spot as before her blood patch and her back is sore from being stuck so many times. Ice pack applied to back of neck. Pt holding baby in a semi fowlers position.

## 2021-01-23 NOTE — PROGRESS NOTES
Day 2 postpartum, vaginal delivery, s/p day 1 post blood patch.   Neuro exam nl  Exam normal.   A: stable   Plan,  Advance care  As ordered, toradol and muscle relaxer

## 2021-01-23 NOTE — FLOWSHEET NOTE
Pt states she wants to talk to anesthesia regarding a blood patch.  Georgi Vazquez notified.   Georgi Vazquez states she will call dr Tarsha Patel and consult

## 2021-01-24 VITALS
WEIGHT: 205 LBS | SYSTOLIC BLOOD PRESSURE: 104 MMHG | OXYGEN SATURATION: 97 % | HEART RATE: 62 BPM | TEMPERATURE: 97.4 F | DIASTOLIC BLOOD PRESSURE: 67 MMHG | HEIGHT: 66 IN | RESPIRATION RATE: 16 BRPM | BODY MASS INDEX: 32.95 KG/M2

## 2021-01-24 PROCEDURE — 6370000000 HC RX 637 (ALT 250 FOR IP): Performed by: OBSTETRICS & GYNECOLOGY

## 2021-01-24 RX ADMIN — IBUPROFEN 800 MG: 400 TABLET ORAL at 00:50

## 2021-01-24 RX ADMIN — DOCUSATE SODIUM 100 MG: 100 CAPSULE, LIQUID FILLED ORAL at 00:50

## 2021-01-24 RX ADMIN — DOCUSATE SODIUM 100 MG: 100 CAPSULE, LIQUID FILLED ORAL at 08:37

## 2021-01-24 NOTE — PROGRESS NOTES
Pt was unable to go home yesterday due to severe spinal headache. Blood patch redone, iv fluids given. This morning, neuro exam is wnl, no pain is reported. Pt is able to function as expected.   Plan to discharge home

## 2021-01-24 NOTE — DISCHARGE SUMMARY
Obstetric Discharge Summary    Patient Name: Adrienne Ortiz  Patient : 1995  Room/Bed: [address] [place] [address]  Primary Care Physician: No primary care provider on file. Admit Date: 2021  5:25 AM  MRN #: [id number]  [facility] #: [id number]    Admitting Diagnosis  IUP 39 weeks  OB History        4    Para   3    Term   3            AB   1    Living   3       SAB   1    TAB        Ectopic        Molar        Multiple   0    Live Births   3              Patient Active Problem List   Diagnosis   (none) - all problems resolved or deleted         Reasons for Admission on 2021  5:25 AM  Term pregnancy [Z34.90]  No comment available  Onset of Labor    Prenatal Procedures  None    Intrapartum Procedures                 Spontaneous Vaginal Delivery: See Labor and Delivery Summary       Postpartum Procedures  Blood patch 2 days in a row for spinal headaches    Postpartum/Operative Complications        Data  Information for the patient's :  Denney Runner [[id number]]   male   Birth Weight: 8 lb 10.3 oz (3.92 kg)     Discharge With Mother  Complications: No    Discharge Diagnosis       Discharge Information/Patient Instructions:   Adelia Morataya   Home Medication Instructions MANUELA:[id number]    Printed on:21 1001   Medication Information                      cyclobenzaprine (FLEXERIL) 5 MG tablet  Take 1 tablet by mouth 3 times daily as needed for Muscle spasms             docusate sodium (COLACE) 100 MG capsule  Take 1 capsule by mouth 3 times daily as needed for Constipation             doxylamine-pyridoxine 10-10 MG TBEC  Take 2 tabs PO qhs.   If symptoms persists after 2 days increase to 1 tab PO qam and 2 tabs PO qhs             ferric carboxymaltose (INJECTAFER) 750 MG/15ML SOLN IV solution  Infuse 15 mLs intravenously every 7 days for 2 doses             ferrous sulfate (IRON 325) 325 (65 Fe) MG tablet  Take 1 tablet by mouth 2 times daily             ketorolac (TORADOL) 10 MG tablet  Take 1 tablet by mouth every 6 hours as needed for Pain             Prenatal MV-Min-Fe Fum-FA-DHA (PRENATAL 1 PO)  Take by mouth                 No discharge procedures on file. Activity: activity as tolerated  Diet: regular diet  Wound Care: none needed      Discharge to: Home  Follow up in 6 weeks       Discharge Date: 1/24/2021      Freda Opitz, MD      Comments: maternal condition is stable and improving  Home care, Follow-up care and birth control were reviewed. Signs and symptoms of mastitis and Post Partum Depression were reviewed. The patient is to notify her physician if any of these occur. The patient was counseled on secondary smoke risks and the increased risk of sudden infant death syndrome and respiratory problems to her baby with exposure. She was counseled on various alternate recommendations to decrease the exposure to secondary smoke to her children.

## 2021-01-24 NOTE — FLOWSHEET NOTE
Pt continues to complain of a headache. Dr Kaylan Salvador states pt is not to go home. Karrie Barragan 104 notified. Orders received.

## 2021-01-25 ENCOUNTER — TELEPHONE (OUTPATIENT)
Dept: OBGYN CLINIC | Age: 26
End: 2021-01-25

## 2021-01-25 DIAGNOSIS — G97.1 SPINAL HEADACHE: Primary | ICD-10-CM

## 2021-01-25 RX ORDER — BUTALBITAL, ACETAMINOPHEN AND CAFFEINE 50; 325; 40 MG/1; MG/1; MG/1
1 TABLET ORAL EVERY 4 HOURS PRN
Qty: 60 TABLET | Refills: 0 | Status: SHIPPED | OUTPATIENT
Start: 2021-01-25 | End: 2022-09-26

## 2021-01-26 ENCOUNTER — HOSPITAL ENCOUNTER (OUTPATIENT)
Dept: LABOR AND DELIVERY | Age: 26
Discharge: HOME | End: 2021-01-26

## 2021-01-26 PROCEDURE — S9443 LACTATION CLASS: HCPCS

## 2021-01-28 NOTE — H&P
Will Walls is a 31 y.o. female patient. No diagnosis found. Past Medical History:   Diagnosis Date    Anemia     Miscarriage      OB History        4    Para   3    Term   3            AB   1    Living   3       SAB   1    TAB        Ectopic        Molar        Multiple   0    Live Births   3              39w2d  Estimated Date of Delivery: 21  No Known Allergies  Active Problems:    * No active hospital problems. *  Resolved Problems:     (normal spontaneous vaginal delivery)    Term pregnancy    Blood pressure 104/67, pulse 62, temperature 97.4 °F (36.3 °C), temperature source Temporal, resp. rate 16, height 5' 6\" (1.676 m), weight 205 lb (93 kg), last menstrual period 2020, SpO2 97 %, unknown if currently breastfeeding. Maternal Medical History:   Contractions: Frequency: irregular. Perceived severity is moderate. Fetal activity: Perceived fetal activity is normal.    Last perceived fetal movement was within the past hour. Prenatal complications: anemia    Prenatal Complications - Diabetes: none. Maternal Exam:   Abdomen: Patient reports no abdominal tenderness. Fetal presentation: vertex    Introitus: Normal vulva. Normal vagina. Pelvis: adequate for delivery. Fetal Exam  Fetal Monitor Review: Mode: ultrasound. Variability: moderate (6-25 bpm). Pattern: accelerations present and no decelerations. Fetal State Assessment: Category I - tracings are normal.          Assessment:  1. IUP at 39 2/7 wks  2. History of prior CS x 1  3. History of successful   4. Latent labor    Plan:  1. Admit for labor augmentation  2.   Routine admission labs and intrapartum care  3  Analgesia per patient request        Sonya Mejía MD  2021

## 2021-05-25 ENCOUNTER — TELEMEDICINE (OUTPATIENT)
Dept: OBGYN CLINIC | Age: 26
End: 2021-05-25

## 2021-05-25 DIAGNOSIS — Z30.011 ENCOUNTER FOR INITIAL PRESCRIPTION OF CONTRACEPTIVE PILLS: ICD-10-CM

## 2021-05-25 DIAGNOSIS — N89.8 VAGINAL DISCHARGE: ICD-10-CM

## 2021-05-25 DIAGNOSIS — Z87.59 HISTORY OF PLACENTA ACCRETA: Primary | ICD-10-CM

## 2021-05-25 PROCEDURE — 99213 OFFICE O/P EST LOW 20 MIN: CPT | Performed by: NURSE PRACTITIONER

## 2021-05-25 RX ORDER — ACETAMINOPHEN AND CODEINE PHOSPHATE 120; 12 MG/5ML; MG/5ML
1 SOLUTION ORAL DAILY
Qty: 28 TABLET | Refills: 11 | Status: SHIPPED | OUTPATIENT
Start: 2021-05-25 | End: 2022-07-06 | Stop reason: SDUPTHER

## 2021-05-25 ASSESSMENT — ENCOUNTER SYMPTOMS
DIARRHEA: 0
GASTROINTESTINAL NEGATIVE: 1
ALLERGIC/IMMUNOLOGIC NEGATIVE: 1
CONSTIPATION: 0
EYES NEGATIVE: 1
RESPIRATORY NEGATIVE: 1

## 2021-05-25 NOTE — PROGRESS NOTES
2021    TELEHEALTH EVALUATION -- Audio/Visual (During [facility]- public health emergency)    HPI:    Maxine Castro (:  1995) has requested an audio/video evaluation for the following concern(s):    Pt presents requesting to start birth control. Baby is 1 months old. Having some yellow discharge, described as what she had with her last miscarriage. No periods d/t nursing. Has not been using birth control. Has done well with the mini-pill in the past, requesting to restart. Pt states Dr Shania Reeves stated during her delivery she had some placenta accreta and would likely need to rechecked. Questions if this could be related to the discharge. Review of Systems   Constitutional: Negative. HENT: Negative. Eyes: Negative. Respiratory: Negative. Cardiovascular: Negative. Gastrointestinal: Negative. Negative for constipation and diarrhea. Endocrine: Negative. Genitourinary: Negative. Negative for frequency, menstrual problem and urgency. Musculoskeletal: Positive for myalgias. Skin: Negative. Allergic/Immunologic: Negative. Neurological: Negative. Hematological: Negative. Psychiatric/Behavioral: Negative. All other systems reviewed and are negative. Prior to Visit Medications    Medication Sig Taking?  Authorizing Provider   norethindrone (ORTHO MICRONOR) 0.35 MG tablet Take 1 tablet by mouth daily Yes Marisol Sauer, APRN - CNP   butalbital-acetaminophen-caffeine (FIORICET, ESGIC) -40 MG per tablet Take 1 tablet by mouth every 4 hours as needed for Headaches  Akosua Krueger MD   ketorolac (TORADOL) 10 MG tablet Take 1 tablet by mouth every 6 hours as needed for Pain  Sarah Coleman MD   ferrous sulfate (IRON 325) 325 (65 Fe) MG tablet Take 1 tablet by mouth 2 times daily  Akosua Krueger MD   docusate sodium (COLACE) 100 MG capsule Take 1 capsule by mouth 3 times daily as needed for Constipation  Akosua Krueger MD   Prenatal MV-Min-Fe Pulmonary/Chest: [x] Respiratory effort normal.  [x] No visualized signs of difficulty breathing or respiratory distress        [] Abnormal-      Musculoskeletal:   [x] Normal gait with no signs of ataxia         [x] Normal range of motion of neck        [] Abnormal-       Neurological:        [x] No Facial Asymmetry (Cranial nerve 7 motor function) (limited exam to video visit)          [x] No gaze palsy        [] Abnormal-         Skin:        [x] No significant exanthematous lesions or discoloration noted on facial skin         [] Abnormal-            Psychiatric:       [x] Normal Affect [x] No Hallucinations        [] Abnormal-     Other pertinent observable physical exam findings-     ASSESSMENT/PLAN:  1. History of placenta accreta  - US NON OB TRANSVAGINAL; Future    2. Encounter for initial prescription of contraceptive pills    3. Vaginal discharge    Will start mini-pill after negative pregnancy test at home. Will order TVUS to check for any evidence of retained placenta. If discharge does not improve, will need appointment in office for exam and cultures. Pt states understanding. Return if symptoms worsen or fail to improve, for annual exam.    Lovella Hammans, was evaluated through a synchronous (real-time) audio-video encounter. The patient (or guardian if applicable) is aware that this is a billable service. Verbal consent to proceed has been obtained within the past 12 months. The visit was conducted pursuant to the emergency declaration under the [address] [place] [address] authority and the [employer] Act. Patient identification was verified, and a caregiver was present when appropriate. The patient was located in a state where the provider was credentialed to provide care.     Total time spent on this encounter: Not billed by time    --KAIA Beauchamp CNP on 5/25/2021 at 4:49 PM    An electronic signature was used to authenticate this note.

## 2021-05-25 NOTE — PATIENT INSTRUCTIONS
Patient Education        norethindrone  Pronunciation:  nor eth IN drone  Brand:  Aygestin, Annie, Stephanie, Jolivette, Janelle-Be, Ortho Micronor  What is the most important information I should know about norethindrone? You should not use this medicine if you you have: undiagnosed vaginal bleeding, breast cancer, liver disease, or a liver tumor. You may not be able to take norethindrone if you have ever had a heart attack, a stroke, or blood clot. Do not use if you are pregnant or trying to become pregnant. In some cases, you should not take norethindrone if you are nursing. What is norethindrone? Norethindrone is a form of progesterone, a female hormone important for regulating ovulation and menstruation. Norethindrone is used for birth control (contraception) to prevent pregnancy. Norethindrone is also used to treat menstrual disorders, endometriosis, or abnormal vaginal bleeding caused by a hormone imbalance. Not all brands of this medicine are for the same uses. Some brands are for use only as contraception. Others are for use in treating endometriosis or vaginal bleeding disorders. Avoid medication errors by using only the brand, form, and strength your doctor prescribes. Norethindrone may also be used for other purposes not listed in this medication guide. What should I discuss with my healthcare provider before using norethindrone? You should not use norethindrone if you are allergic to it, or if you have:  · unusual vaginal bleeding that has not been checked by a doctor;  · liver disease or a liver tumor;  · breast cancer; or  · a history of blood clots in your brain, eyes, lungs, or legs. Do not use norethindrone if you are pregnant or trying to become pregnant. Stop taking the medicine and tell your doctor right away if you become pregnant. Ask your doctor about using this medicine while you are breast-feeding. In some cases, you should not take norethindrone if you are nursing.   Tell your birth control pill increases your risk of becoming pregnant. If you are more than 3 hours late for your dose, take the medicine as soon as you remember and use back-up birth control for at least 48 hours. Take your next pill at the regularly scheduled time and continue on your regular dosing schedule. If you miss a period for two months in a row, call your doctor because you might be pregnant. What happens if I overdose? Seek emergency medical attention or call the Poison Help line at 1-864.514.4348. An overdose of norethindrone is not expected to be dangerous. What should I avoid while taking norethindrone? Do not use estrogen medication unless your doctor tells you to. Avoid smoking. It can greatly increase your risk of blood clots, stroke, or heart attack while taking norethindrone for contraception. Norethindrone will not protect you from sexually transmitted diseases--including HIV and AIDS. Using a condom is the only way to protect yourself from these diseases. What are the possible side effects of norethindrone? Get emergency medical help if you have signs of an allergic reaction: hives; difficult breathing; swelling of your face, lips, tongue, or throat. Call your doctor at once if you have:  · sudden vision loss, bulging eyes, or severe headache;  · swelling, rapid weight gain;  · unusual vaginal bleeding;  · missed menstrual periods;  · pelvic pain (especially on one side);  · a breast lump;  · a light-headed feeling, like you might pass out;  · increased thirst, increased urination;  · liver problems --loss of appetite, stomach pain (upper right side), dark urine, jaundice (yellowing of the skin or eyes); or  · signs of a blood clot --sudden numbness or weakness, problems with vision or speech, chest pain, shortness of breath, swelling or redness in an arm or leg.   Common side effects may include:  · irregular vaginal bleeding or spotting;  · headache;  · breast pain or swelling;  · stomach pain, bloating, nausea, vomiting;  · hair loss;  · depressed mood, trouble sleeping;  · weight gain; or  · vaginal itching or discharge. This is not a complete list of side effects and others may occur. Call your doctor for medical advice about side effects. You may report side effects to FDA at 8-226-FDA-0056. What other drugs will affect norethindrone? Some drugs can make norethindrone less effective, which may result in unintended pregnancy if you use this medicine for contraception. Tell your doctor about all your other medicines, especially:  · [place]'s wort;  · medicine to treat an infection (antibiotics or antifungal medicine);  · medicine to treat tuberculosis;  · medicine to treat HIV or AIDS; or  · seizure medication. This list is not complete. Other drugs may affect norethindrone, including prescription and over-the-counter medicines, vitamins, and herbal products. Not all possible drug interactions are listed here. Where can I get more information? Your pharmacist can provide more information about norethindrone. Remember, keep this and all other medicines out of the reach of children, never share your medicines with others, and use this medication only for the indication prescribed. Every effort has been made to ensure that the information provided by [address] is accurate, up-to-date, and complete, but no guarantee is made to that effect. Drug information contained herein may be time sensitive. [facility] information has been compiled for use by healthcare practitioners and consumers in the [place] and therefore [facility] does not warrant that uses outside of the [place] are appropriate, unless specifically indicated otherwise. [facility]'s drug information does not endorse drugs, diagnose patients or recommend therapy.  [facility] drug information is an informational resource designed to assist licensed healthcare practitioners in caring for their patients and/or to serve consumers viewing this service as a supplement to, and not a substitute for, the expertise, skill, knowledge and judgment of healthcare practitioners. The absence of a warning for a given drug or drug combination in no way should be construed to indicate that the drug or drug combination is safe, effective or appropriate for any given patient. [facility] does not assume any responsibility for any aspect of healthcare administered with the aid of information [facility] provides. The information contained herein is not intended to cover all possible uses, directions, precautions, warnings, drug interactions, allergic reactions, or adverse effects. If you have questions about the drugs you are taking, check with your doctor, nurse or pharmacist.  Copyright 5203-1307 [address]. Version: 3.01. Revision date: 2/18/2019. Care instructions adapted under license by [facility] ([facility]). If you have questions about a medical condition or this instruction, always ask your healthcare professional. [place] [address] any warranty or liability for your use of this information.

## 2021-06-04 ENCOUNTER — HOSPITAL ENCOUNTER (OUTPATIENT)
Dept: ULTRASOUND IMAGING | Age: 26
Discharge: HOME | End: 2021-06-04

## 2021-06-04 DIAGNOSIS — Z87.59 HISTORY OF PLACENTA ACCRETA: ICD-10-CM

## 2021-06-04 PROCEDURE — 76830 TRANSVAGINAL US NON-OB: CPT

## 2022-06-20 RX ORDER — NORETHINDRONE 0.35 MG
KIT ORAL
Qty: 28 TABLET | Refills: 0 | OUTPATIENT
Start: 2022-06-20

## 2022-07-06 RX ORDER — ACETAMINOPHEN AND CODEINE PHOSPHATE 120; 12 MG/5ML; MG/5ML
1 SOLUTION ORAL DAILY
Qty: 28 TABLET | Refills: 0 | Status: SHIPPED | OUTPATIENT
Start: 2022-07-06 | End: 2022-09-26

## 2022-07-06 NOTE — TELEPHONE ENCOUNTER
Pk Ramirez called this morning and scheduled an office visit with López Ramos for this Friday, July 8th. Patient advised that she is currently out of birth control and asked if the refill request can now please be sent to her Pharmacy. Thank you.

## 2022-09-06 ENCOUNTER — TELEPHONE (OUTPATIENT)
Dept: OBGYN CLINIC | Age: 27
End: 2022-09-06

## 2022-09-06 NOTE — TELEPHONE ENCOUNTER
Patient called to schedule a pregnancy confirmation appt. Please return her call. The best time to reach her is anytime. Thank you!

## 2022-09-15 ENCOUNTER — TELEPHONE (OUTPATIENT)
Dept: OBGYN CLINIC | Age: 27
End: 2022-09-15

## 2022-09-15 NOTE — TELEPHONE ENCOUNTER
Clovis Abbott called to reschedule a  pregnancy confirmation  . Please be advised that the best time to call her to accommodate their needs is Anytime. Thank you.

## 2022-09-26 ENCOUNTER — OFFICE VISIT (OUTPATIENT)
Dept: OBGYN CLINIC | Age: 27
End: 2022-09-26

## 2022-09-26 VITALS
HEART RATE: 62 BPM | DIASTOLIC BLOOD PRESSURE: 70 MMHG | SYSTOLIC BLOOD PRESSURE: 103 MMHG | BODY MASS INDEX: 24.43 KG/M2 | WEIGHT: 152 LBS | HEIGHT: 66 IN

## 2022-09-26 DIAGNOSIS — Z12.4 SCREENING FOR CERVICAL CANCER: ICD-10-CM

## 2022-09-26 DIAGNOSIS — Z36.89 CONFIRM FETAL CARDIAC ACTIVITY USING ULTRASOUND: ICD-10-CM

## 2022-09-26 DIAGNOSIS — N91.2 AMENORRHEA: Primary | ICD-10-CM

## 2022-09-26 DIAGNOSIS — Z32.00 POSSIBLE PREGNANCY: ICD-10-CM

## 2022-09-26 PROCEDURE — 99213 OFFICE O/P EST LOW 20 MIN: CPT | Performed by: NURSE PRACTITIONER

## 2022-09-26 PROCEDURE — 81025 URINE PREGNANCY TEST: CPT | Performed by: NURSE PRACTITIONER

## 2022-09-26 RX ORDER — ONDANSETRON 4 MG/1
4 TABLET, ORALLY DISINTEGRATING ORAL EVERY 8 HOURS PRN
Qty: 30 TABLET | Refills: 1 | Status: SHIPPED | OUTPATIENT
Start: 2022-09-26

## 2022-09-26 RX ORDER — PROMETHAZINE HYDROCHLORIDE 25 MG/1
25 TABLET ORAL 4 TIMES DAILY PRN
Qty: 30 TABLET | Refills: 1 | Status: SHIPPED | OUTPATIENT
Start: 2022-09-26 | End: 2022-10-03

## 2022-09-26 ASSESSMENT — ENCOUNTER SYMPTOMS
ALLERGIC/IMMUNOLOGIC NEGATIVE: 1
NAUSEA: 1
CONSTIPATION: 0
RESPIRATORY NEGATIVE: 1
DIARRHEA: 0
VOMITING: 1
EYES NEGATIVE: 1

## 2022-09-26 NOTE — PROGRESS NOTES
Nestor Pineda is a 28 y.o. female who presents today for her medical conditions/ complaints as noted below. Nestor Pineda is c/o of Confirmation        HPI  Pt presents with +UPT at home and in office. Approx 8 weeks by LMP. Taking PNV. Having NV. Due for pap smear. Patient's last menstrual period was 2022 (exact date). [id number]    Past Medical History:   Diagnosis Date    Anemia     Miscarriage      Past Surgical History:   Procedure Laterality Date     SECTION N/A 2017     SECTION performed by Hussein Solorzano MD at [facility] OR    DILATION AND CURETTAGE OF UTERUS N/A 2020    DILATATION AND CURETTAGE SUCTION performed by Darrick Flores MD at [address]      as an infant    [facility] REPAIR ENTEROCELE, [address] N/A 2018    VAGINAL CANAL REPAIR performed by Darrick Flores MD at [address]     History reviewed. No pertinent family history. Social History     Tobacco Use    Smoking status: Never    Smokeless tobacco: Never   Substance Use Topics    Alcohol use: No     Alcohol/week: 0.0 standard drinks       Current Outpatient Medications   Medication Sig Dispense Refill    Prenatal MV-Min-Fe Fum-FA-DHA (PRENATAL 1 PO) Take by mouth       No current facility-administered medications for this visit. No Known Allergies  Vitals:    22 1609   BP: 103/70   Pulse: 62     Body mass index is 24.53 kg/m². Review of Systems   Constitutional: Negative. HENT: Negative. Eyes: Negative. Respiratory: Negative. Cardiovascular: Negative. Gastrointestinal:  Positive for nausea and vomiting. Negative for constipation and diarrhea. Endocrine: Negative. Genitourinary:  Positive for menstrual problem (missed x2). Negative for frequency and urgency. Musculoskeletal: Negative. Skin: Negative. Allergic/Immunologic: Negative. Neurological: Negative. Hematological: Negative. Psychiatric/Behavioral: Negative.      All other systems reviewed and are negative. Physical Exam  Vitals and nursing note reviewed. Constitutional:       Appearance: She is well-developed. HENT:      Head: Normocephalic. Right Ear: External ear normal.      Left Ear: External ear normal.      Nose: Nose normal.   Genitourinary:     General: Normal vulva. Vagina: Normal.      Cervix: Normal.      Comments: Pap collected  Musculoskeletal:         General: Normal range of motion. Cervical back: Normal range of motion. Skin:     General: Skin is warm and dry. Neurological:      Mental Status: She is alert and oriented to person, place, and time. Psychiatric:         Attention and Perception: Attention normal.         Mood and Affect: Mood normal.         Speech: Speech normal.         Behavior: Behavior normal.         Thought Content: Thought content normal.         Cognition and Memory: Cognition normal.         Judgment: Judgment normal.        Diagnosis Orders   1. Amenorrhea  POCT urine pregnancy      2. Possible pregnancy        3. Confirm fetal cardiac activity using ultrasound  US OB LESS THAN 14 WEEKS SINGLE OR FIRST GESTATION      4. Screening for cervical cancer  PAP SMEAR          MEDICATIONS:  No orders of the defined types were placed in this encounter. ORDERS:  Orders Placed This Encounter   Procedures    US OB LESS THAN 14 WEEKS SINGLE OR FIRST GESTATION    PAP SMEAR    POCT urine pregnancy       PLAN:  Pap collected  Starting Zofran and Phenergan for prn use  Plan viability u/s and NOB  Gave handouts and discussed teaching    Plan of care was discussed with patient. Patient was encouraged to adhere to a well-balanced diet, including increasing water intake and limiting excessive caffeine and salt. The benefits of exercise were discussed; however she was advised against heavy lifting, sit-ups and abdominal crunches. A list of safe OTC medications was provided and discussed.   The patient was cautioned against the use of tanning beds, hot tubs, saunas, and x-rays. Avoidance of tobacco, alcohol and illicit drugs was also discussed due to harmful effects on the fetus and increased risks associated with pregnancy. Certain labs and ultrasounds are required at certain times during pregnancy but others are optional, including the serum integrated screen/[place]/AFP/ Panorama, and other genetic testing. The patient was encouraged to attend childbirth classes and general hospital information was provided based on patients hospital of choice. There are no Patient Instructions on file for this visit.

## 2022-09-26 NOTE — PROGRESS NOTES
Pt is here for confirmation. She states she has been having nausea and vomiting. She is needing her pap.

## 2022-10-12 ENCOUNTER — TELEPHONE (OUTPATIENT)
Dept: OBGYN CLINIC | Age: 27
End: 2022-10-12

## 2022-10-12 NOTE — TELEPHONE ENCOUNTER
Daniel Freya requests that clinic return their call. The best time to reach her is Anytime. Daniel Pillai stated that her ultrasound had to be moved and now she is needing to reschedule her New OB appointment to after her u/s is completed. Please contact patient to advise. [facility] was unable to accommodate in time frame requested. Thank you.

## 2022-10-17 ENCOUNTER — HOSPITAL ENCOUNTER (OUTPATIENT)
Dept: ULTRASOUND IMAGING | Age: 27
Discharge: HOME | End: 2022-10-17

## 2022-10-17 DIAGNOSIS — Z36.89 CONFIRM FETAL CARDIAC ACTIVITY USING ULTRASOUND: ICD-10-CM

## 2022-10-17 PROCEDURE — 76801 OB US < 14 WKS SINGLE FETUS: CPT | Performed by: RADIOLOGY

## 2022-10-17 PROCEDURE — 76801 OB US < 14 WKS SINGLE FETUS: CPT

## 2022-10-31 ENCOUNTER — INITIAL PRENATAL (OUTPATIENT)
Dept: OBGYN CLINIC | Age: 27
End: 2022-10-31

## 2022-10-31 VITALS
SYSTOLIC BLOOD PRESSURE: 106 MMHG | DIASTOLIC BLOOD PRESSURE: 67 MMHG | BODY MASS INDEX: 24.37 KG/M2 | WEIGHT: 151 LBS | HEART RATE: 75 BPM

## 2022-10-31 DIAGNOSIS — Z3A.14 14 WEEKS GESTATION OF PREGNANCY: ICD-10-CM

## 2022-10-31 DIAGNOSIS — O36.0910 RHESUS ISOIMMUNIZATION AFFECTING PREGNANCY IN FIRST TRIMESTER, SINGLE OR UNSPECIFIED FETUS: ICD-10-CM

## 2022-10-31 DIAGNOSIS — Z34.82 ENCOUNTER FOR SUPERVISION OF OTHER NORMAL PREGNANCY IN SECOND TRIMESTER: Primary | ICD-10-CM

## 2022-10-31 DIAGNOSIS — Z34.82 ENCOUNTER FOR SUPERVISION OF OTHER NORMAL PREGNANCY IN SECOND TRIMESTER: ICD-10-CM

## 2022-10-31 LAB
ABO/RH: NORMAL
ANTIBODY SCREEN: NORMAL
HEPATITIS C ANTIBODY INTERPRETATION: NORMAL

## 2022-10-31 PROCEDURE — 99213 OFFICE O/P EST LOW 20 MIN: CPT | Performed by: NURSE PRACTITIONER

## 2022-10-31 NOTE — PATIENT INSTRUCTIONS
Patient Education        Weeks 14 to 18 of Your Pregnancy: Care Instructions  Around this time, you may start to look pregnant. Your baby is now able to pass urine. And the first stool (meconium) is starting to collect in your baby's intestines. Hair is starting to grow on your baby's head. You may notice some skin changes, such as itchy spots on your palms or acne on your face. At your next doctor visit, you may have an ultrasound. So you might think about whether you want to know the sex of your baby. Also ask your doctor about a flu shot. How to reduce stress   Ask for help when you need it. Try to avoid things that cause you stress. Seek out things that relieve stress, such as a warm bath or yoga. How to get exercise   If you don't usually exercise, start slowly with short walks. Try to be active 30 minutes a day, at least 5 days a week. Avoid activities where you're more likely to fall. Use light weights to reduce stress on your joints. How to stay at a healthy weight for you   Talk to your doctor or midwife about how much weight you should gain. It's generally best to gain:  About 28 to 40 pounds if you're underweight. About 25 to 35 pounds if you're at a healthy weight. About 15 to 25 pounds if you're overweight. About 11 to 20 pounds if you're very overweight (obese). Follow-up care is a key part of your treatment and safety. Be sure to make and go to all appointments, and call your doctor if you are having problems. It's also a good idea to know your test results and keep a list of the medicines you take. Where can you learn more? Go to [email or web address]. org and sign in to your [employer] account. Enter R830 in the Label[employer].me box to learn more about \"Weeks 14 to 18 of Your Pregnancy: Care Instructions. \"     If you do not have an account, please click on the \"Sign Up Now\" link.   Current as of: February 23, 2022               Content Version: 13.4  © 4587-3230 Healthwise, Incorporated. Care instructions adapted under license by [facility] ([facility]). If you have questions about a medical condition or this instruction, always ask your healthcare professional. [address] any warranty or liability for your use of this information.

## 2022-10-31 NOTE — PROGRESS NOTES
Pt is here for routine parental care. Pt denies vaginal bleeding, cramping or leaking of fluid. She denies genetic testing today, she would like BJ for delivery.

## 2022-10-31 NOTE — PROGRESS NOTES
Subjective: Lady Nahomy is here for a return obstetrical visit. Today she is 14w0d weeks EGA. She is doing well has no unusual complaints. She  does not have vaginal bleeding, leaking of fluid, contractions. She does not have blurred vision, SOB, or increased swelling in legs or face. Pt does not feel fetal movement regularly. Having some varicose veins in legs, tender and swollen. Will order support hose. Objective: Mother's Prenatal Vitals  BP: 106/67  Weight: 151 lb (68.5 kg)  Heart Rate: 75  Patient Position: Sitting  Prenatal Fetal Information  Fetal HR: US  Movement: Absent  Pt is A&Ox3, in no acute distress. Normocephalic, atraumatic. PERRL. Resp even and non-labored. Skin pink, warm & dry. Gravid abdomen. ERAZO's well. Gait steady. Assessment:  IUP at 14w0d wks      Diagnosis Orders   1. Encounter for supervision of other normal pregnancy in second trimester  Herpes simplex virus (HSV) I/II antibodies IgG & IgM w/ reflex    HIV Obstetric Panel    Culture, Urine    C.trachomatis N.gonorrhoeae DNA    Hepatitis C Antibody      2. 14 weeks gestation of pregnancy  Herpes simplex virus (HSV) I/II antibodies IgG & IgM w/ reflex    HIV Obstetric Panel    Culture, Urine    C.trachomatis N.gonorrhoeae DNA    Hepatitis C Antibody        Plan:Pt counseled on Genetic testing  Continue with routine prenatal care. Ordering support hose  NOB labs pending  Declines genetic testing  RTC in 4 wk for prenatal visit    MEDICATIONS:  No orders of the defined types were placed in this encounter.       ORDERS:  Orders Placed This Encounter   Procedures    Culture, Urine    C.trachomatis N.gonorrhoeae DNA    Herpes simplex virus (HSV) I/II antibodies IgG & IgM w/ reflex    HIV Obstetric Panel    Hepatitis C Antibody

## 2022-11-01 LAB
C. TRACHOMATIS DNA ,URINE: NOT DETECTED
N. GONORRHOEAE DNA, URINE: NOT DETECTED
TRICHOMONAS VAGINALIS DNA, URINE: NOT DETECTED

## 2022-11-02 LAB — URINE CULTURE, ROUTINE: NORMAL

## 2022-11-03 LAB
BASOPHILS ABSOLUTE: 0 K/UL (ref 0–0.2)
BASOPHILS RELATIVE PERCENT: 0.5 % (ref 0–1)
EOSINOPHILS ABSOLUTE: 0 K/UL (ref 0–0.6)
EOSINOPHILS RELATIVE PERCENT: 0.5 % (ref 0–5)
HCT VFR BLD CALC: 37.9 % (ref 37–47)
HEMOGLOBIN: 12.3 G/DL (ref 12–16)
HEPATITIS B SURFACE ANTIGEN INTERPRETATION: ABNORMAL
HIV-1 P24 AG: ABNORMAL
IMMATURE GRANULOCYTES #: 0 K/UL
LYMPHOCYTES ABSOLUTE: 1.8 K/UL (ref 1.1–4.5)
LYMPHOCYTES RELATIVE PERCENT: 28.7 % (ref 20–40)
MCH RBC QN AUTO: 29.6 PG (ref 27–31)
MCHC RBC AUTO-ENTMCNC: 32.5 G/DL (ref 33–37)
MCV RBC AUTO: 91.1 FL (ref 81–99)
MONOCYTES ABSOLUTE: 0.3 K/UL (ref 0–0.9)
MONOCYTES RELATIVE PERCENT: 4.4 % (ref 0–10)
NEUTROPHILS ABSOLUTE: 4 K/UL (ref 1.5–7.5)
NEUTROPHILS RELATIVE PERCENT: 65.4 % (ref 50–65)
PDW BLD-RTO: 12.7 % (ref 11.5–14.5)
PLATELET # BLD: 205 K/UL (ref 130–400)
PMV BLD AUTO: 9.9 FL (ref 9.4–12.3)
RAPID HIV 1&2: ABNORMAL
RBC # BLD: 4.16 M/UL (ref 4.2–5.4)
RPR: ABNORMAL
RUBELLA ANTIBODY IGG: REACTIVE
WBC # BLD: 6.2 K/UL (ref 4.8–10.8)

## 2022-11-10 LAB
HERPES TYPE 1/2 IGM COMBINED: 0.32 IV
HERPES TYPE I/II IGG COMBINED: >22.4 IV
HSV 1 GLYCOPROTEIN G AB IGG: 30.2 IV
HSV 2 GLYCOPROTEIN G AB IGG: 0.12 IV

## 2022-11-14 ENCOUNTER — PATIENT MESSAGE (OUTPATIENT)
Dept: OBGYN CLINIC | Age: 27
End: 2022-11-14

## 2022-11-14 DIAGNOSIS — O26.892 DYSURIA DURING PREGNANCY IN SECOND TRIMESTER: ICD-10-CM

## 2022-11-14 DIAGNOSIS — R30.0 DYSURIA DURING PREGNANCY IN SECOND TRIMESTER: ICD-10-CM

## 2022-11-14 DIAGNOSIS — R30.0 DYSURIA DURING PREGNANCY IN SECOND TRIMESTER: Primary | ICD-10-CM

## 2022-11-14 DIAGNOSIS — Z36.9 ANTENATAL SCREENING ENCOUNTER: Primary | ICD-10-CM

## 2022-11-14 DIAGNOSIS — Z36.9 ANTENATAL SCREENING ENCOUNTER: ICD-10-CM

## 2022-11-14 DIAGNOSIS — O26.892 DYSURIA DURING PREGNANCY IN SECOND TRIMESTER: Primary | ICD-10-CM

## 2022-11-14 LAB
BILIRUBIN URINE: NEGATIVE
BLOOD, URINE: NEGATIVE
CLARITY: CLEAR
COARSE CASTS, UA: ABNORMAL /LPF (ref 0–5)
COLOR: YELLOW
GLUCOSE URINE: NEGATIVE MG/DL
KETONES, URINE: NEGATIVE MG/DL
LEUKOCYTE ESTERASE, URINE: ABNORMAL
NITRITE, URINE: NEGATIVE
PH UA: 8.5 (ref 5–8)
PROTEIN UA: NEGATIVE MG/DL
RBC UA: ABNORMAL /HPF (ref 0–2)
SPECIFIC GRAVITY UA: 1.01 (ref 1–1.03)
UROBILINOGEN, URINE: 0.2 E.U./DL
WBC UA: ABNORMAL /HPF (ref 0–5)

## 2022-11-14 RX ORDER — NITROFURANTOIN 25; 75 MG/1; MG/1
100 CAPSULE ORAL 2 TIMES DAILY
Qty: 20 CAPSULE | Refills: 0 | Status: SHIPPED | OUTPATIENT
Start: 2022-11-14 | End: 2022-11-24

## 2022-11-14 NOTE — TELEPHONE ENCOUNTER
From: Diamond RUBY  To: Mansoor John  Sent: 11/14/2022 4:28 PM CST  Subject: Rx    Kim Punt, we will send in Jacqueline Vasquez 103 while we wait for your culture to come back, that way you are already starting something to treat your symptoms.      Katerina Collado [facility] OB[facility]

## 2022-11-16 LAB
ORGANISM: ABNORMAL
URINE CULTURE, ROUTINE: ABNORMAL
URINE CULTURE, ROUTINE: ABNORMAL

## 2022-11-25 LAB
Lab: NEGATIVE
Lab: NORMAL
NTRA ALPHA-THALASSEMIA: NEGATIVE
NTRA BETA-HEMOGLOBINOPATHIES: NEGATIVE
NTRA CANAVAN DISEASE: NEGATIVE
NTRA CYSTIC FIBROSIS: NEGATIVE
NTRA DUCHENNE/BECKER MUSCULAR DYSTROPHY: NEGATIVE
NTRA FAMILIAL DYSAUTONOMIA: NEGATIVE
NTRA FRAGILE X SYNDROME: NEGATIVE
NTRA GALACTOSEMIA: NEGATIVE
NTRA GAUCHER DISEASE: NEGATIVE
NTRA MEDIUM CHAIN ACYL-COA DEHYDROGENASE DEFICIENCY: NEGATIVE
NTRA POLYCYSTIC KIDNEY DISEASE, AUTOSOMAL RECESSIVE: NEGATIVE
NTRA SMITH-LEMLI-OPITZ SYNDROME: NEGATIVE
NTRA SPINAL MUSCULAR ATROPHY: NEGATIVE
NTRA TAY-SACHS DISEASE: NEGATIVE

## 2022-11-27 NOTE — PATIENT INSTRUCTIONS
Patient Education        Weeks 18 to 22 of Your Pregnancy: Care Instructions  At this stage you may find that your nausea and fatigue are gone. You may feel better overall and have more energy. But you might now also have some new discomforts, like sleep problems or leg cramps. You may start to feel your baby move. These movements can feel like butterflies or bubbles. Babies at this stage can now suck their thumbs. Get some exercise every day. And avoid caffeine late in the day. Take a warm shower or bath before bed. Try relaxation exercises to calm your mind and body. Use extra pillows. They can help you get comfortable. Don't use sleeping pills or alcohol. They could harm your baby. For leg cramps, stretch and apply heat. A warm bath, leg warmers, a heating pad, or a hot water bottle can help with muscle aches. Stretches for leg cramps  Straighten your leg and bend your foot (flex your ankle) slowly upward, toward your knee. Bend your toes up and down. Stand on a flat surface. Stretch your toes upward. For balance, hold on to the wall or something stable. If it feels okay, take small steps walking on your heels. Follow-up care is a key part of your treatment and safety. Be sure to make and go to all appointments, and call your doctor if you are having problems. It's also a good idea to know your test results and keep a list of the medicines you take. Where can you learn more? Go to https://nuvoTVsevenewpacheco.[employer] org and sign in to your [employer] account. Enter Z888 in the [facility] box to learn more about \"Weeks 18 to 22 of Your Pregnancy: Care Instructions. \"     If you do not have an account, please click on the \"Sign Up Now\" link. Current as of: February 23, 2022               Content Version: 13.4  © 1659-5996 Healthwise, Incorporated. Care instructions adapted under license by [facility] ([facility]).  If you have questions about a medical condition or this instruction, always ask your healthcare professional. [address] any warranty or liability for your use of this information. Patient Education        Nutrition During Pregnancy: Care Instructions  Overview     Healthy eating when you are pregnant is important for you and your baby. It can help you feel well and have a successful pregnancy and delivery. During pregnancy your nutrition needs increase. Even if you have excellent eating habits, your doctor may recommend a multivitamin to make sure you get enough iron and folic acid. You may wonder how much weight you should gain. In general, if you were at a healthy weight before you became pregnant, then you should gain between 25 and 35 pounds. If you were overweight before pregnancy, then you'll likely be advised to gain 15 to 25 pounds. If you were underweight before pregnancy, then you'll probably be advised to gain 28 to 40 pounds. Your doctor will work with you to set a weight goal that is right for you. Gaining a healthy amount of weight helps you have a healthy baby. Follow-up care is a key part of your treatment and safety. Be sure to make and go to all appointments, and call your doctor if you are having problems. It's also a good idea to know your test results and keep a list of the medicines you take. How can you care for yourself at home? Eat plenty of fruits and vegetables. Include a variety of orange, yellow, and leafy dark-green vegetables every day. Choose whole-grain bread, cereal, and pasta. Good choices include whole wheat bread, whole wheat pasta, brown rice, and oatmeal.  Get 4 or more servings of milk and milk products each day. Good choices include nonfat or low-fat milk, yogurt, and cheese. If you cannot eat milk products, you can get calcium from calcium-fortified products such as orange juice, soy milk, and tofu.  Other non-milk sources of calcium include leafy green vegetables, such as broccoli, kale, mustard greens, turnip greens, bok joyce, and brussels sprouts. If you eat meat, pick lower-fat types. Good choices include lean cuts of meat and chicken or turkey without the skin. Do not eat shark, swordfish, balbir mackerel, or tilefish. They have high levels of mercury, which is dangerous to your baby. You can eat up to 12 ounces a week of fish or shellfish that have low mercury levels. Good choices include shrimp, wild salmon, pollock, and catfish. Limit some other types of fish, such as white (albacore) tuna, to 4 oz (0.1 kg) a week. Heat lunch meats (such as turkey, ham, or bologna) to 165°F before you eat them. This reduces your risk of getting sick from a kind of bacteria that can be found in lunch meats. Do not eat unpasteurized soft cheeses, such as brie, feta, fresh mozzarella, and blue cheese. They have a bacteria that could harm your baby. Limit caffeine. If you drink coffee or tea, have no more than 1 cup a day. Caffeine is also found in hermila. Do not drink any alcohol. No amount of alcohol has been found to be safe during pregnancy. Do not diet or try to lose weight. For example, do not follow a low-carbohydrate diet. If you are overweight at the start of your pregnancy, your doctor will work with you to manage your weight gain. Tell your doctor about all vitamins and supplements you take. When should you call for help? Watch closely for changes in your health, and be sure to contact your doctor if you have any problems. Where can you learn more? Go to https://WEMSseveneweb.healthGigmax. org and sign in to your [employer] account. Enter Y785 in the [employer] box to learn more about \"Nutrition During Pregnancy: Care Instructions. \"     If you do not have an account, please click on the \"Sign Up Now\" link. Current as of: May 9, 2022               Content Version: 13.4  © 2739-7649 Healthwise, Wanderable. Care instructions adapted under license by [facility] ([facility]).  If you have questions about a medical condition or this instruction, always ask your healthcare professional. [address] any warranty or liability for your use of this information. Patient Education        Travel During Pregnancy: Care Instructions  Overview     Travel during pregnancy generally is safe if you are healthy and not at risk for problems. The safest time to travel is between 14 and 28 weeks. This is the time when your risks for miscarriage and early labor are lowest. Also, it may be uncomfortable to travel later in your pregnancy. Some airlines do not allow women more than 35 weeks pregnant to fly. You should find a doctor, midwife, or other health professional in the place you travel to. Then, if you have a problem, you have someone to call for help. Your doctor or midwife may be able to give the name of someone. Some hotels also can supply names of local doctors. It is very important that, while traveling, you carry copies of your medical records with you at all times. You should have these in case you need to be seen at a clinic or hospital that does not have your records. This could be very important, especially in emergencies. If you plan to travel overseas, you should find out what vaccines you need to prevent illness. Some vaccines, such as measles, mumps, and rubella, are not safe to get during pregnancy. The safety of other vaccines, such as typhoid, is not known. Talk to your doctor if vaccines are recommended. If you travel by plane often, talk to your doctor about whether it is safe for your unborn child. An occasional flight is not a risk. Follow-up care is a key part of your treatment and safety. Be sure to make and go to all appointments, and call your doctor if you are having problems. It's also a good idea to know your test results and keep a list of the medicines you take. How can you care for yourself at home?   Before planning a trip, ask your doctor if it is safe for you to fly. Some airlines ask to see a note from your doctor with your due date. Choose an aisle seat if possible. This will make it easier to move around in the plane. Always wear your seat belt when you travel in a car, plane, or other vehicle that has seat belts. Strap the lower belt across your lower lap/upper thighs. Place the shoulder belt between your breasts and up over your shoulder, not over your belly. Remove any excess slack in the seat belt. During long trips, take time to walk around at least every 2 hours to keep blood from settling in your legs. Being pregnant increases your chances of getting dangerous blood clots in your legs when you sit still for long periods of time. Walking helps prevent this problem. Consider wearing compression stockings. Drink plenty of water to prevent dehydration. If you are sitting in front of an air bag, slide the seat as far back as you can. Tilt the seat back slightly to increase the distance between your chest and the air bag. If you get motion sickness, make sure the medicine you use is safe for pregnant women. If you plan to travel overseas, call the [place] Centers for Disease Control and Prevention (9-474.176.2337) to get information on diseases in the area you will travel to. You can also go to wwwnc.cdc.gov/travel/default. aspx to find information. Avoid travel to places where mosquito-borne illnesses are found. For example, malaria can cause serious illness in you and your unborn baby and cannot always be prevented. If you are not sure the tap water is safe, drink bottled or boiled water. Do not eat salads, and do not eat raw vegetables unless they are peeled. Where can you learn more? Go to https://Impulsonicpadma.[employer] Inbox. org and sign in to your [employer] account. Enter E758 in the [employer] box to learn more about \"Travel During Pregnancy: Care Instructions. \"     If you do not have an account, please click on the \"Sign Up Now\" link.  Current as of: February 23, 2022               Content Version: 13.4  © 2006-2022 Healthwise, SmartKem. Care instructions adapted under license by [facility] ([facility]). If you have questions about a medical condition or this instruction, always ask your healthcare professional. [address] any warranty or liability for your use of this information. Patient Education        Learning About Dental Care During Pregnancy  Why is dental care important during pregnancy? It's important to take care of your body when you are pregnant. This includes your teeth and gums. A healthy mouth--and good dental habits--will help you and your baby. Taking care of your teeth while you are pregnant helps prevent cavities and other dental problems. Brush, floss, and try to limit sugary foods and drinks. Getting the right vitamins and nutrients is good for your baby's teeth, which begin to form before birth. And remember that it's safe--and a good idea--to visit the dentist during your pregnancy. What changes in your mouth during pregnancy? Some changes in your mouth during pregnancy are normal and should go away after your baby is born. You have more blood flow to the mucous membranes of the mouth and gums when you are pregnant. This may cause bleeding in your gums, especially when you brush your teeth. Your gums may be more swollen and tender than usual. Try using a toothbrush with soft bristles. Your teeth might feel a little loose. This usually does not cause any problems and goes away after pregnancy. If you have morning sickness or digestive problems like reflux, stomach acid in your mouth can weaken your teeth. This may make them feel more sensitive and makes cavities more likely. Regular brushing can help. Keep brushing gently, even if your teeth feel more sensitive or your gums bleed. How can you care for your teeth during pregnancy?   Keep brushing your teeth twice a day and try to floss once a day. It's fine to use toothpaste with fluoride in it while you are pregnant. Fluoride helps prevent tooth decay. If you gag when brushing, try using a toothbrush with a smaller head or toothpaste that doesn't foam when you brush. Eat a balanced, nutritious diet and get the right amount of vitamins and minerals. You may have cravings for sugary snacks and drinks, but too much sugar can lead to cavities. If you have reflux or morning sickness, rinse out your mouth. Try rinsing with a mixture of one teaspoon of baking soda in a cup of water. Wait at least 20 minutes before brushing. You might also ask your doctor if you can take over-the-counter medicine for reflux. Be safe with medicines. Read and follow all instructions on the label. Do not smoke when you are pregnant or allow others to smoke around you. If you need help quitting, talk to your doctor about stop-smoking programs and medicines. These can increase your chances of quitting for good. Regular visits to your dentist during pregnancy are important to prevent problems. Tell your dentist that you are pregnant. Dental X-rays and local anesthesia are generally safe during pregnancy. Most dental work can be done while you are pregnant. If you go to the dentist during the second or third trimesters, you may be more comfortable sitting in a different position in the dental chair. Delaying dental care can make a problem worse. Follow-up care is a key part of your treatment and safety. Be sure to make and go to all appointments, and call your doctor if you are having problems. It's also a good idea to know your test results and keep a list of the medicines you take. Where can you learn more? Go to https://audrey.noodls. org and sign in to your I[employer]le account. Enter G168 in the [employer] box to learn more about \"Learning About Dental Care During Pregnancy. \"     If you do not have an account, please click on the \"Sign Up Now\" link. Current as of: February 23, 2022               Content Version: 13.4  © 2006-2022 PAYMEY. Care instructions adapted under license by [facility] ([facility]). If you have questions about a medical condition or this instruction, always ask your healthcare professional. [address] any warranty or liability for your use of this information. Patient Education        Second-Trimester Fetal Ultrasound: About This Test  What is it? Fetal ultrasound is a test that uses sound waves to make pictures of your baby (fetus) and placenta inside the uterus. The test is the safest way to find out the age, size, and position of your baby. You also may be able to find out the sex of your baby. (But the test isn't done just to find out a baby's sex.)  No known risks to the mother or the baby are linked to fetal ultrasound. But you may feel anxious if the test reveals a problem with your pregnancy or baby. Why is this test done? In the second trimester, a fetal ultrasound is done to:  Estimate the number of weeks and days a fetus has developed since the beginning of the pregnancy. This is called the gestational age. Look at the size and position of the fetus, the placenta, and the fluid that surrounds the fetus. Find major birth defects, such as heart problems or problems with the brain and spinal cord (neural tube defects). But the test may not be able to find many minor defects and some major birth defects. How do you prepare for the test?  In general, there's nothing you have to do before this test, unless your doctor tells you to. How is the test done? You may be able to leave your clothes on, or you will be given a gown to wear. You will lie on your back on a padded examination table. A gel will be spread on your belly.  It will be removed after the test.  A small, handheld device called a transducer will be pressed against the gel on your skin and moved across your belly several times. You may watch the monitor to see the picture of your baby during the test.  What happens after the test?  You will probably be able to go home right away. You most likely will be able to go back to your usual activities right away. Follow-up care is a key part of your treatment and safety. Be sure to make and go to all appointments, and call your doctor if you are having problems. It's also a good idea to keep a list of the medicines you take. Ask your doctor when you can expect to have your test results. Where can you learn more? Go to https://Ophthotechpepiceweb.CHAINels. org and sign in to your HEXIO account. Enter Q829 in the Zakaz.ua box to learn more about \"Second-Trimester Fetal Ultrasound: About This Test.\"     If you do not have an account, please click on the \"Sign Up Now\" link. Current as of: February 23, 2022               Content Version: 13.4  © 2006-2022 Healthwise, Incorporated. Care instructions adapted under license by [facility] ([facility]). If you have questions about a medical condition or this instruction, always ask your healthcare professional. [place] [address] any warranty or liability for your use of this information.

## 2022-11-28 ENCOUNTER — ROUTINE PRENATAL (OUTPATIENT)
Dept: OBGYN CLINIC | Age: 27
End: 2022-11-28

## 2022-11-28 VITALS — BODY MASS INDEX: 27.28 KG/M2 | DIASTOLIC BLOOD PRESSURE: 70 MMHG | WEIGHT: 169 LBS | SYSTOLIC BLOOD PRESSURE: 100 MMHG

## 2022-11-28 DIAGNOSIS — Z3A.18 18 WEEKS GESTATION OF PREGNANCY: Primary | ICD-10-CM

## 2022-11-28 DIAGNOSIS — Z34.82 ENCOUNTER FOR SUPERVISION OF OTHER NORMAL PREGNANCY IN SECOND TRIMESTER: ICD-10-CM

## 2022-11-28 PROCEDURE — 99213 OFFICE O/P EST LOW 20 MIN: CPT | Performed by: ADVANCED PRACTICE MIDWIFE

## 2022-11-29 NOTE — PROGRESS NOTES
CNM Prenatal Office Note  Subjective: Kavon Llanos is here for a return obstetrical visit. Today she is 18w1d weeks EGA. She is doing well, taking her PNV as directed and has no unusual complaints. She does not have vaginal bleeding, n/v, syncope, or headaches. Pt does feel fetal movement regularly. Objective: Mother's Prenatal Vitals  BP: 100/70  Weight: 169 lb (76.7 kg)  Patient Position: Sitting  Prenatal Fetal Information  Fetal HR: 153  Movement: Increased  Pt is A&Ox3, in no acute distress. Normocephalic, atraumatic. PERRL. Resp even and non-labored. Skin pink, warm & dry. Gravid abdomen. ERAZO's well. Gait steady. Assessment:    IUP at 18w1d wks      Diagnosis Orders   1. 18 weeks gestation of pregnancy  External Referral To Maternal Fetal Medicine      2. Encounter for supervision of other normal pregnancy in second trimester  External Referral To Maternal Fetal Medicine        Plan:  Pt counseled on balanced nutrition, adequate fluid intake, taking PNV daily, and exercise along with  upcoming anatomy scan  Continue with routine prenatal care. RTC in 4 wks for prenatal visit      MEDICATIONS:  No orders of the defined types were placed in this encounter. ORDERS:  Orders Placed This Encounter   Procedures    External Referral To Maternal Fetal Medicine       More than 50% of this 20 min visit was education and counseling.

## 2022-12-27 NOTE — PATIENT INSTRUCTIONS
Patient Education        Weeks 22 to 26 of Your Pregnancy: Care Instructions  Your baby's lungs are getting ready for breathing. Your baby may respond to your voice. Your baby likely turns less, and kicks or jerks more. Jerking may mean that your baby has hiccups. Think about taking childbirth classes. And start to think about whether you want to have pain medicine during labor. At your next doctor visit, you may be tested for high blood sugar that first occurs during pregnancy (gestational diabetes). This condition can cause problems for you and your baby. To ease discomfort from your baby's kicking   Change your position. Take a deep breath as you raise your arm over your head, and breathe out as you drop your arm. To ease or reduce swelling in your feet, ankles, hands, and fingers   Take off your rings. Avoid high-salt foods, such as potato chips. Prop up your feet, and sleep with pillows under your feet. Do not stand for long periods of time. Do not wear tight shoes. Wear support stockings. Kegel exercises to prevent urine from leaking  Squeeze your muscles as if you were trying not to pass gas. Your belly, legs, and buttocks shouldn't move. Hold the squeeze for 3 seconds, then relax for 5 to 10 seconds. Add 1 second each week until you can squeeze for 10 seconds. Repeat the exercise 10 times a session. Do 3 to 8 sessions a day. If these exercises cause you pain, stop doing them and talk with your doctor. Follow-up care is a key part of your treatment and safety. Be sure to make and go to all appointments, and call your doctor if you are having problems. It's also a good idea to know your test results and keep a list of the medicines you take. Where can you learn more? Go to http://www.woods.com/ and enter G264 to learn more about \"Weeks 22 to 26 of Your Pregnancy: Care Instructions. \"  Current as of: February 23, 2022               Content Version: 13.5  © 6328-2177 [employer], [employer]. Care instructions adapted under license by [facility] ([facility]). If you have questions about a medical condition or this instruction, always ask your healthcare professional. [address] any warranty or liability for your use of this information. Patient Education        Learning About Dental Care During Pregnancy  Why is dental care important during pregnancy? It's important to take care of your body when you are pregnant. This includes your teeth and gums. A healthy mouth--and good dental habits--will help you and your baby. Taking care of your teeth while you are pregnant helps prevent cavities and other dental problems. Brush, floss, and try to limit sugary foods and drinks. Getting the right vitamins and nutrients is good for your baby's teeth, which begin to form before birth. And remember that it's safe--and a good idea--to visit the dentist during your pregnancy. What changes in your mouth during pregnancy? Some changes in your mouth during pregnancy are normal and should go away after your baby is born. You have more blood flow to the mucous membranes of the mouth and gums when you are pregnant. This may cause bleeding in your gums, especially when you brush your teeth. Your gums may be more swollen and tender than usual. Try using a toothbrush with soft bristles. Your teeth might feel a little loose. This usually does not cause any problems and goes away after pregnancy. If you have morning sickness or digestive problems like reflux, stomach acid in your mouth can weaken your teeth. This may make them feel more sensitive and makes cavities more likely. Regular brushing can help. Keep brushing gently, even if your teeth feel more sensitive or your gums bleed. How can you care for your teeth during pregnancy? Keep brushing your teeth twice a day and try to floss once a day. It's fine to use toothpaste with fluoride in it while you are pregnant. Fluoride helps prevent tooth decay. If you gag when brushing, try using a toothbrush with a smaller head or toothpaste that doesn't foam when you brush. Eat a balanced, nutritious diet and get the right amount of vitamins and minerals. You may have cravings for sugary snacks and drinks, but too much sugar can lead to cavities. If you have reflux or morning sickness, rinse out your mouth. Try rinsing with a mixture of one teaspoon of baking soda in a cup of water. Wait at least 20 minutes before brushing. You might also ask your doctor if you can take over-the-counter medicine for reflux. Be safe with medicines. Read and follow all instructions on the label. Do not smoke when you are pregnant or allow others to smoke around you. If you need help quitting, talk to your doctor about stop-smoking programs and medicines. These can increase your chances of quitting for good. Regular visits to your dentist during pregnancy are important to prevent problems. Tell your dentist that you are pregnant. Dental X-rays and local anesthesia are generally safe during pregnancy. Most dental work can be done while you are pregnant. If you go to the dentist during the second or third trimesters, you may be more comfortable sitting in a different position in the dental chair. Delaying dental care can make a problem worse. Follow-up care is a key part of your treatment and safety. Be sure to make and go to all appointments, and call your doctor if you are having problems. It's also a good idea to know your test results and keep a list of the medicines you take. Where can you learn more? Go to http://www.woods.com/ and enter G168 to learn more about \"Learning About Dental Care During Pregnancy. \"  Current as of: February 23, 2022               Content Version: 13.5  © 9902-4215 Healthwise, Incorporated. Care instructions adapted under license by [facility] ([facility]).  If you have questions about a medical condition or this instruction, always ask your healthcare professional. [place] [address] any warranty or liability for your use of this information. [facility]   One Hour Glucose Test Instructions    The 1 Hour Glucose test is designed to screen for gestational diabetes. This screening test is usually performed between 26-29 weeks of gestation. Gestational diabetes results in higher than normal blood sugar levels and can lead to pregnancy complications if not diagnosed and treated. For this reason, we recommend that all women undergo screening.  - You may eat or drink a normal breakfast or lunch prior to the test, but please avoid anything that contains excessive sugar. For example, do not eat sugary cereals, candy or drink soda or fruit juice.  - You will be given this drink at the Outpatient Lab (#[id number]) located on the 1st floor of [address] [place] [address] the entire bottle of the glucose drink, within 10 minutes and note the time that you finish the drink. Also, inform the  of the time that you finish. After drinking the glucose, you may only have water until your blood is drawn.    - Your blood needs to be drawn precisely 1 hour after you finished the glucose drink. If you have a prenatal appointment as well, when you arrive at the office please let the  know that you are doing the glucose test. Let her know the time you need to return to the lab area to have your blood drawn for the testing.  - You will also have a CBC drawn at this time to check your blood count and check your iron.      Please do not hesitate to call the office at [phone], option 2, if you have any additional questions

## 2023-01-03 ENCOUNTER — ROUTINE PRENATAL (OUTPATIENT)
Dept: OBGYN CLINIC | Age: 28
End: 2023-01-03

## 2023-01-03 VITALS
HEART RATE: 79 BPM | SYSTOLIC BLOOD PRESSURE: 109 MMHG | WEIGHT: 174 LBS | DIASTOLIC BLOOD PRESSURE: 72 MMHG | BODY MASS INDEX: 28.08 KG/M2

## 2023-01-03 DIAGNOSIS — Z67.91 RH NEGATIVE STATE IN ANTEPARTUM PERIOD: ICD-10-CM

## 2023-01-03 DIAGNOSIS — Z3A.23 23 WEEKS GESTATION OF PREGNANCY: Primary | ICD-10-CM

## 2023-01-03 DIAGNOSIS — O26.899 RH NEGATIVE STATE IN ANTEPARTUM PERIOD: ICD-10-CM

## 2023-01-03 DIAGNOSIS — Z34.82 ENCOUNTER FOR SUPERVISION OF OTHER NORMAL PREGNANCY IN SECOND TRIMESTER: ICD-10-CM

## 2023-01-03 DIAGNOSIS — Z36.9 ANTENATAL SCREENING ENCOUNTER: ICD-10-CM

## 2023-01-03 PROCEDURE — 99213 OFFICE O/P EST LOW 20 MIN: CPT | Performed by: ADVANCED PRACTICE MIDWIFE

## 2023-01-03 NOTE — PROGRESS NOTES
Patient presents today for routine prenatal care. Pt denies any vaginal leaking bleeding or contractions. + Fetal movement. They recently lost her 's grandma, and then they got the flu.

## 2023-01-03 NOTE — PROGRESS NOTES
CNM Prenatal Office Note  Subjective: Alonso Rai is here for a return obstetrical visit. Today she is 23w1d weeks EGA. She reports the following:    Problems/complaints today:  none  Objective: Mother's Prenatal Vitals  BP: 109/72  Weight: 174 lb (78.9 kg)  Heart Rate: 79  Patient Position: Sitting  Prenatal Fetal Information  Fetal HR: 154  Movement: Present  Pt is A&Ox3, in no acute distress. Normocephalic, atraumatic. PERRL. Resp even and non-labored. Skin pink, warm & dry. Gravid abdomen. ERAZO's well. Gait steady. Assessment:    IUP at 23w1d wks      Diagnosis Orders   1. 23 weeks gestation of pregnancy        2. Encounter for supervision of other normal pregnancy in second trimester        3. Rhesus isoimmunization affecting pregnancy, second trimester, fetus 1        4.  screening encounter        5. Rh negative state in antepartum period          Plan:  Problems/complaints Management Plan:  none  Routine OB Management Plan:  Pt counseled on balanced nutrition, adequate fluid intake, taking PNV daily, and exercise along with  pelviectasis noted on u/s - reviewed with pt. Recheck in 4-6 weeks. Continue with routine prenatal care. RTC in 4 wks for prenatal visit. MEDICATIONS:  No orders of the defined types were placed in this encounter. ORDERS:  No orders of the defined types were placed in this encounter. More than 50% of this 20 min visit was education and counseling. Maricel Shore

## 2023-02-08 ENCOUNTER — ROUTINE PRENATAL (OUTPATIENT)
Dept: OBGYN CLINIC | Age: 28
End: 2023-02-08

## 2023-02-08 VITALS
SYSTOLIC BLOOD PRESSURE: 106 MMHG | WEIGHT: 182 LBS | BODY MASS INDEX: 29.38 KG/M2 | DIASTOLIC BLOOD PRESSURE: 62 MMHG | HEART RATE: 72 BPM

## 2023-02-08 DIAGNOSIS — Z71.89 ENCOUNTER FOR ANTEPARTUM CONSULTATION REGARDING LACTATION: ICD-10-CM

## 2023-02-08 DIAGNOSIS — Z3A.28 28 WEEKS GESTATION OF PREGNANCY: Primary | ICD-10-CM

## 2023-02-08 DIAGNOSIS — Z67.91 RH NEGATIVE STATE IN ANTEPARTUM PERIOD: ICD-10-CM

## 2023-02-08 DIAGNOSIS — O26.899 RH NEGATIVE STATE IN ANTEPARTUM PERIOD: ICD-10-CM

## 2023-02-08 DIAGNOSIS — Z34.83 ENCOUNTER FOR SUPERVISION OF OTHER NORMAL PREGNANCY IN THIRD TRIMESTER: ICD-10-CM

## 2023-02-08 DIAGNOSIS — Z13.32 ENCOUNTER FOR SCREENING FOR MATERNAL DEPRESSION: ICD-10-CM

## 2023-02-08 SDOH — ECONOMIC STABILITY: HOUSING INSECURITY
IN THE LAST 12 MONTHS, WAS THERE A TIME WHEN YOU DID NOT HAVE A STEADY PLACE TO SLEEP OR SLEPT IN A SHELTER (INCLUDING NOW)?: NO

## 2023-02-08 SDOH — ECONOMIC STABILITY: FOOD INSECURITY: WITHIN THE PAST 12 MONTHS, YOU WORRIED THAT YOUR FOOD WOULD RUN OUT BEFORE YOU GOT MONEY TO BUY MORE.: NEVER TRUE

## 2023-02-08 SDOH — ECONOMIC STABILITY: FOOD INSECURITY: WITHIN THE PAST 12 MONTHS, THE FOOD YOU BOUGHT JUST DIDN'T LAST AND YOU DIDN'T HAVE MONEY TO GET MORE.: NEVER TRUE

## 2023-02-08 SDOH — ECONOMIC STABILITY: INCOME INSECURITY: HOW HARD IS IT FOR YOU TO PAY FOR THE VERY BASICS LIKE FOOD, HOUSING, MEDICAL CARE, AND HEATING?: NOT HARD AT ALL

## 2023-02-08 SDOH — ECONOMIC STABILITY: TRANSPORTATION INSECURITY
IN THE PAST 12 MONTHS, HAS LACK OF TRANSPORTATION KEPT YOU FROM MEETINGS, WORK, OR FROM GETTING THINGS NEEDED FOR DAILY LIVING?: NO

## 2023-02-08 NOTE — PROGRESS NOTES
CNM Prenatal Office Note  Subjective: Lynnette Castro is here for a return obstetrical visit. Today she is 28w2d weeks EGA. Pt does feel fetal movement regularly. Denies headaches, RUQ pain, or visual changes as well as contractions, vaginal bleeding or leaking of fluid. 1 hour GCT today. Rhogam indicated. Problems/Complaints today:  none  Objective: Mother's Prenatal Vitals  BP: 106/62  Weight: 182 lb (82.6 kg)  Heart Rate: 72  Patient Position: Sitting  Prenatal Fetal Information  Fundal Height (cm): 28 cm  Movement: Present  Pt is A&Ox3, in no acute distress. Normocephalic, atraumatic. PERRL. Resp even and non-labored. Skin pink, warm & dry. Gravid abdomen. ERAZO's well. Gait steady. EPDS Screenin/30  Assessment:    IUP at 28w2d wks      Diagnosis Orders   1. 28 weeks gestation of pregnancy        2. Encounter for antepartum consultation regarding lactation        3. Encounter for screening for maternal depression        4. Rh negative state in antepartum period        5. Encounter for supervision of other normal pregnancy in third trimester          Plan:  Problems/Complaints Management Plan:  none  Routine OB Management Plan: Third trimester teaching completed including warning signs for pre-eclampsia (blurred vision, seeing spots/sparkles, sudden increased weight gain or profound edema, epigastric pain), FKC (decreased fetal movments),  labor ( contractions or watery discharge, vaginal bleeding, cramping), n/v, chills, and or fever. Instructed pt to come to office or go to LDR if these symptoms presented. Pt voiced understanding. Pt counseled on balanced nutrition, adequate fluid intake, taking PNV daily, and exercise along with GHTN precautions, Kick count and  labor  Continue with routine prenatal care. RTC in 2 wks for prenatal visit      MEDICATIONS:  No orders of the defined types were placed in this encounter.     ORDERS:  No orders of the defined types were placed in this encounter. More than 50% of this 20 min visit was education and counseling.

## 2023-02-08 NOTE — PROGRESS NOTES
After obtaining consent, and per orders of Dr. Josee Stokes, injection of Rhogam given in Left upper quad. gluteus by Nelia Starks. Patient instructed to remain in clinic for 20 minutes afterwards, and to report any adverse reaction to me immediately.

## 2023-02-08 NOTE — PATIENT INSTRUCTIONS
Patient Education        Weeks 26 to 30 of Your Pregnancy: Care Instructions  You're starting your last trimester. Aundria Elizabeth probably feel your baby moving around more. Your back may ache as your body gets used to your baby's size and length. Take care of yourself, and pay attention to what your body needs. Talk to your doctor about getting the Tdap shot. It will help protect your  against whooping cough (pertussis). You may have [place] contractions. They are single or several strong contractions without a pattern. These are practice contractions but not the start of labor. Be kind to yourself. Take breaks when you're tired. Change positions often. Don't sit for too long or stand for too long. At work, rest during breaks if you can. If you don't get breaks, talk to your doctor about writing a letter to your employer to request them. Avoid fumes, chemicals, and tobacco smoke. Be sexual if you want to. You may be interested in sex, or you may not. Everyone is different. Sex is okay unless your doctor tells you not to. Your belly can make it hard to find good positions for sex. [place] and explore. Watch for signs of  labor. These signs include:  Menstrual-like cramps. Or you may have pain or pressure in your pelvis that happens in a pattern. About 6 or more contractions in an hour (even after rest and a glass of water). A low, dull backache that doesn't go away when you change positions. An increase or change in vaginal discharge. Your water breaking. Know what to do if you think you are having contractions. Drink 1 or 2 glasses of water. Lie down on your left side for at least an hour. While on your side, feel the top of your belly to see if it's tight. Write down your contractions for an hour. Time how long it is from the start of one contraction to the start of the next. Call your doctor if you have regular contractions.   Follow-up care is a key part of your treatment and safety. Be sure to make and go to all appointments, and call your doctor if you are having problems. It's also a good idea to know your test results and keep a list of the medicines you take.  Where can you learn more?  Go to https://www.Marketfish.net/patientEd and enter S999 to learn more about \"Weeks 26 to 30 of Your Pregnancy: Care Instructions.\"  Current as of: February 23, 2022               Content Version: 13.5  © 6971-8807 [employer].   Care instructions adapted under license by [employer]. If you have questions about a medical condition or this instruction, always ask your healthcare professional. [employer] disclaims any warranty or liability for your use of this information.       Patient Education        Learning About Screening for Gestational Diabetes  What is gestational diabetes screening?     Screening for gestational diabetes is a way to look for high blood sugar during pregnancy. You drink some very sweet liquid. Then you have a blood test to see how your body uses sugar (glucose).  How is gestational diabetes screening done?  Screening for gestational diabetes may be done in a couple of ways.  Two-part screening.  Part one (glucose challenge test): A blood sample is taken after you drink a liquid that contains sugar (glucose). You don't need to stop eating or drinking before this test. If the test shows that you don't have a lot of sugar in your blood, you don't have gestational diabetes.  Part two (oral glucose tolerance test, or OGTT): If the first test shows a lot of sugar in your blood, then you may have an OGTT. You can't eat or drink for at least 8 hours before this test. A blood sample is taken, then you drink a sweet liquid. You have more blood tests after 1 to 3 hours. If the OGTT shows that you have a lot of sugar in your blood, you may have gestational diabetes.  One-part screening.  Sometimes doctors use the OGTT on its own. If the test  shows that you don't have a lot of sugar in your blood, you don't have gestational diabetes. If you do have a lot of sugar in your blood, you may have the condition. What are the risks of screening? Your blood glucose level may drop very low toward the end of the test. If this happens, you may feel weak, hungry, and restless. Tell your doctor if you have these symptoms. The test usually will be stopped. You may vomit after drinking the sweet liquid. If this happens, you may need to take the test at a later time. Your doctor may do more glucose tests at other times during your pregnancy. Follow-up care is a key part of your treatment and safety. Be sure to make and go to all appointments, and call your doctor if you are having problems. It's also a good idea to know your test results and keep a list of the medicines you take. Where can you learn more? Go to http://www.woods.com/ and enter A472 to learn more about \"Learning About Screening for Gestational Diabetes. \"  Current as of: April 13, 2022               Content Version: 13.5  © 4788-0107 [employer]. Care instructions adapted under license by [facility] ([facility]). If you have questions about a medical condition or this instruction, always ask your healthcare professional. [address] any warranty or liability for your use of this information. Patient Education        Learning About Rh Immunoglobulin Shots  Introduction     An Rh immunoglobulin shot is given to pregnant women who have Rh-negative blood. You may have Rh-negative blood, and your baby may have Rh-positive blood. If the two types of blood mix, your body will make antibodies. This is called Rh sensitization. Most of the time, this is not a problem the first time you're pregnant. But it could cause problems in future pregnancies. This shot keeps your body from making the antibodies. You get the shot around 28 weeks of pregnancy.  After the birth, your baby's blood is tested. If the blood is Rh positive, you will get another shot. You may also get the shot if you have vaginal bleeding while you are pregnant or if you have a miscarriage. These shots protect future pregnancies. Women with Rh negative blood will need this shot each time they get pregnant. Example  Rh immunoglobulin (HypRho-D, MICRhoGAM, and RhoGAM)  Possible side effects  Rare side effects may include:  Some mild pain where you got the shot. A slight fever. An allergic reaction. You may have other side effects not listed here. Check the information that comes with your medicine. What to know about taking this medicine  You may need more than one shot. You may need the shot again:  After amniocentesis, fetal blood sampling, or chorionic villus sampling tests. If you have bleeding in your second or third trimester. After turning of a breech baby. After an injury to the belly while you are pregnant. After a miscarriage or an . Before or right after treatment for an ectopic or a partial molar pregnancy. Tell your doctor if you have any allergies or have had a bad response to medicines in the past.  If you get this shot within 3 months of getting a live-virus vaccine, the vaccine may not work. Your doctor will tell you if you need more vaccine. Check with your doctor or pharmacist before you use any other medicines. This includes over-the-counter medicines. Make sure your doctor knows all of the medicines, vitamins, herbs, and supplements you take. Taking some medicines at the same time can cause problems. Where can you learn more? Go to http://www.woods.com/ and enter V615 to learn more about \"Learning About Rh Immunoglobulin Shots. \"  Current as of: 2022               Content Version: 13.5  © 2155-9982 Healthwise, [employer]. Care instructions adapted under license by [facility] ([facility]).  If you have questions about a medical condition or this instruction, always ask your healthcare professional. [place] [address] any warranty or liability for your use of this information. Patient Education        Learning About Depression During Pregnancy  Who is at risk for depression during pregnancy? If you had depression before you became pregnant, you're more likely to have it during your pregnancy. Or you may have it for the first time when you're pregnant. It may be more likely if you feel anxious about your pregnancy or if you've had problems with a pregnancy before. No one should feel ashamed about depression. You aren't weak. And you don't have a character flaw. When you have depression, chemicals in your brain are out of balance. These chemicals are called neurotransmitters. Managing depression is important for your own health. But it will also help you to have a healthy baby. You can treat depression with counseling, medicines, or both of these. Lifestyle changes may also help. How do you know if you are depressed? With all the changes in your life, you may not know if you are depressed. Pregnancy sometimes causes changes in how you feel that are similar to the symptoms of depression. Symptoms of depression include:  Feeling sad or hopeless and losing interest in daily activities. These are the most common symptoms of depression. Sleeping too much or not enough. Feeling tired. You may feel as if you have no energy. Eating too much or too little. Writing or talking about death, such as writing suicide notes or talking about guns, knives, or pills. Where to get help 24 hours a day, 7 days a week   If you or someone you know talks about suicide, self-harm, a mental health crisis, a substance use crisis, or any other kind of emotional distress, get help right away. You can:  Call the Suicide and Crisis Lifeline at 65. Call 9-174-447-TALK (2-428.295.4520). Text HOME to 416295 to access the Crisis Text Line.   Consider saving these numbers in your phone. How is depression during pregnancy treated? Counseling. This can focus on how you feel about your pregnancy, your relationships, and changes in your life. It gives you emotional support. And it can help you solve problems and set goals. One type of counseling helps you take charge of how you think and feel. This is called cognitive behavioral therapy. Antidepressant medicines. These medicines may improve or get rid of depression symptoms. Whether you need them depends a lot on how bad your symptoms are. Talk to your doctor about whether this medicine is right for you. You can also get regular exercise, healthy food, fresh air, and time with people who care about you. These are all important ways to prevent and treat depression and have a healthy pregnancy. Follow-up care is a key part of your treatment and safety. Be sure to make and go to all appointments, and call your doctor if you are having problems. It's also a good idea to know your test results and keep a list of the medicines you take. Where can you learn more? Go to http://www.woods.com/ and enter F937 to learn more about \"Learning About Depression During Pregnancy. \"  Current as of: February 23, 2022               Content Version: 13.5  © 5734-5355 [employer]. Care instructions adapted under license by [facility] ([facility]). If you have questions about a medical condition or this instruction, always ask your healthcare professional. [place] [address] any warranty or liability for your use of this information. Patient Education        Counting Your Baby's Kicks: Care Instructions  Overview     Counting your baby's kicks is one way your doctor can tell that your baby is healthy. Most women--especially in a first pregnancy--feel their baby move for the first time between 16 and 22 weeks. The movement may feel like flutters rather than kicks.  Your baby may move more at certain times of the day. When you are active, you may notice less kicking than when you are resting. At your prenatal visits, your doctor will ask whether the baby is active. In your last trimester, your doctor may ask you to count the number of times you feel your baby move. Follow-up care is a key part of your treatment and safety. Be sure to make and go to all appointments, and call your doctor if you are having problems. It's also a good idea to know your test results and keep a list of the medicines you take. How do you count fetal kicks? A common method of checking your baby's movement is to note the length of time it takes to count ten movements (such as kicks, flutters, or rolls). Pick your baby's most active time of day to count. This may be any time from morning to evening. If you don't feel 10 movements in an hour, have something to eat or drink and count for another hour. If you don't feel at least 10 movements in the 2-hour period, call your doctor. When should you call for help? Call your doctor now or seek immediate medical care if:    You noticed that your baby has stopped moving or is moving much less than normal.   Watch closely for changes in your health, and be sure to contact your doctor if you have any problems. Where can you learn more? Go to http://www.woods.com/ and enter U048 to learn more about \"Counting Your Baby's Kicks: Care Instructions. \"  Current as of: February 23, 2022               Content Version: 13.5  © 2006-2022 Healthwise, [employer]. Care instructions adapted under license by [facility] ([facility]). If you have questions about a medical condition or this instruction, always ask your healthcare professional. [place] [address] any warranty or liability for your use of this information.        Patient Education        Travel During Pregnancy: Care Instructions  Overview     Travel during pregnancy generally is safe if you are healthy and not at risk for problems. The safest time to travel is between 14 and 28 weeks. This is the time when your risks for miscarriage and early labor are lowest. Also, it may be uncomfortable to travel later in your pregnancy. Some airlines do not allow women more than 35 weeks pregnant to fly. You should find a doctor, midwife, or other health professional in the place you travel to. Then, if you have a problem, you have someone to call for help. Your doctor or midwife may be able to give the name of someone. Some hotels also can supply names of local doctors. It is very important that, while traveling, you carry copies of your medical records with you at all times. You should have these in case you need to be seen at a clinic or hospital that does not have your records. This could be very important, especially in emergencies. If you plan to travel overseas, you should find out what vaccines you need to prevent illness. Some vaccines, such as measles, mumps, and rubella, are not safe to get during pregnancy. The safety of other vaccines, such as typhoid, is not known. Talk to your doctor if vaccines are recommended. If you travel by plane often, talk to your doctor about whether it is safe for your unborn child. An occasional flight is not a risk. Follow-up care is a key part of your treatment and safety. Be sure to make and go to all appointments, and call your doctor if you are having problems. It's also a good idea to know your test results and keep a list of the medicines you take. How can you care for yourself at home? Before planning a trip, ask your doctor if it is safe for you to fly. Some airlines ask to see a note from your doctor with your due date. Choose an aisle seat if possible. This will make it easier to move around in the plane. Always wear your seat belt when you travel in a car, plane, or other vehicle that has seat belts. Strap the lower belt across your lower lap/upper thighs.  Place the shoulder belt between your breasts and up over your shoulder, not over your belly. Remove any excess slack in the seat belt. During long trips, take time to walk around at least every 2 hours to keep blood from settling in your legs. Being pregnant increases your chances of getting dangerous blood clots in your legs when you sit still for long periods of time. Walking helps prevent this problem. Consider wearing compression stockings. Drink plenty of water to prevent dehydration. If you are sitting in front of an air bag, slide the seat as far back as you can. Tilt the seat back slightly to increase the distance between your chest and the air bag. If you get motion sickness, make sure the medicine you use is safe for pregnant women. If you plan to travel overseas, call the [place] Centers for Disease Control and Prevention (4-569.782.5760) to get information on diseases in the area you will travel to. You can also go to wwwnc.cdc.gov/travel/default. aspx to find information. Avoid travel to places where mosquito-borne illnesses are found. For example, malaria can cause serious illness in you and your unborn baby and cannot always be prevented. If you are not sure the tap water is safe, drink bottled or boiled water. Do not eat salads, and do not eat raw vegetables unless they are peeled. Where can you learn more? Go to http://www.woods.com/ and enter C733 to learn more about \"Travel During Pregnancy: Care Instructions. \"  Current as of: February 23, 2022               Content Version: 13.5  © 2006-2022 Healthwise, [employer]. Care instructions adapted under license by [address] [place] [address]. If you have questions about a medical condition or this instruction, always ask your healthcare professional. [place] [address] any warranty or liability for your use of this information.        Patient Education        Adrian Reyes Contractions: Care Instructions  Your Care Instructions     Adrian Reyes contractions prepare your uterus for labor. Think of them as a \"warm-up\" exercise that your body does. You may begin to feel them between the 28th and 30th weeks of your pregnancy. But they start as early as the 20th week. [place] contractions usually occur more often during the ninth month. They may go away when you are active and return when you rest. These contractions are like mild contractions of true labor, but they occur less often. (You feel fewer than 8 in an hour.) They don't cause your cervix to open. It may be hard for you to tell the difference between [facility] contractions and true labor, especially in your first pregnancy. Follow-up care is a key part of your treatment and safety. Be sure to make and go to all appointments, and call your doctor if you are having problems. It's also a good idea to know your test results and keep a list of the medicines you take. How can you care for yourself at home? Try a warm bath to help relieve muscle tension and reduce pain. Change positions every 30 minutes. Take breaks if you must sit for a long time. Get up and walk around. Drink plenty of water. Taking short walks may help you feel better. Your doctor needs to check any contractions that are getting stronger or closer together. Where can you learn more? Go to http://www.woods.com/ and enter Z402 to learn more about \"[place] Contractions: Care Instructions. \"  Current as of: February 23, 2022               Content Version: 13.5  © 2006-2022 [employer]. Care instructions adapted under license by [facility] ([facility]). If you have questions about a medical condition or this instruction, always ask your healthcare professional. [place] [address] any warranty or liability for your use of this information.        Patient Education

## 2023-03-01 ENCOUNTER — TELEPHONE (OUTPATIENT)
Dept: OBGYN CLINIC | Age: 28
End: 2023-03-01

## 2023-03-01 NOTE — TELEPHONE ENCOUNTER
Jesus Mello called to reschedule her PN appt. Please be advised that the best time to call her to accommodate their needs is  as soon as possible . Thank you.

## 2023-03-02 NOTE — PATIENT INSTRUCTIONS
Patient Education        Weeks 32 to 29 of Your Pregnancy: Care Instructions  Decide whether you want to bank or donate your baby's umbilical cord blood. If you want to save this blood, you have to arrange for it ahead of time. Decide about circumcision. Personal, Mormon, or cultural beliefs may play a role in your decision. You get to decide what you want for your baby. Learn how to ease hemorrhoids. Get more liquids, fruits, vegetables, and fiber in your diet. Avoid sitting for too long. Clean yourself with moist toilet paper. Or try witch hazel pads. Try ice packs or warm sitz baths for discomfort. Use hydrocortisone cream for pain or itching. Ask your doctor about stool softeners. Consider the benefits of breastfeeding. It reduces your baby's risk of sudden infant death syndrome (SIDS).  babies are less likely to get certain infections. And they're less likely to be obese or get diabetes later in life. It can lower your risk of breast and ovarian cancers and osteoporosis. It saves you money. Follow-up care is a key part of your treatment and safety. Be sure to make and go to all appointments, and call your doctor if you are having problems. It's also a good idea to know your test results and keep a list of the medicines you take. Where can you learn more? Go to http://www.woods.com/ and enter X711 to learn more about \"Weeks 32 to 34 of Your Pregnancy: Care Instructions. \"  Current as of: February 23, 2022               Content Version: 13.5  © 2006-2022 [employer]. Care instructions adapted under license by [facility] [employer] [facility] ([facility]). If you have questions about a medical condition or this instruction, always ask your healthcare professional. [place] [address] any warranty or liability for your use of this information.        Patient Education        Counting Your Baby's Kicks: Care Instructions  Overview     Counting your baby's kicks is one way your doctor can tell that your baby is healthy. Most women--especially in a first pregnancy--feel their baby move for the first time between 16 and 22 weeks. The movement may feel like flutters rather than kicks. Your baby may move more at certain times of the day. When you are active, you may notice less kicking than when you are resting. At your prenatal visits, your doctor will ask whether the baby is active. In your last trimester, your doctor may ask you to count the number of times you feel your baby move. Follow-up care is a key part of your treatment and safety. Be sure to make and go to all appointments, and call your doctor if you are having problems. It's also a good idea to know your test results and keep a list of the medicines you take. How do you count fetal kicks? A common method of checking your baby's movement is to note the length of time it takes to count ten movements (such as kicks, flutters, or rolls). Pick your baby's most active time of day to count. This may be any time from morning to evening. If you don't feel 10 movements in an hour, have something to eat or drink and count for another hour. If you don't feel at least 10 movements in the 2-hour period, call your doctor. When should you call for help? Call your doctor now or seek immediate medical care if:    You noticed that your baby has stopped moving or is moving much less than normal.   Watch closely for changes in your health, and be sure to contact your doctor if you have any problems. Where can you learn more? Go to http://www.woods.com/ and enter U048 to learn more about \"Counting Your Baby's Kicks: Care Instructions. \"  Current as of: February 23, 2022               Content Version: 13.5  © 2299-5432 [employer], [employer]. Care instructions adapted under license by [facility] ([facility]).  If you have questions about a medical condition or this instruction, always ask your healthcare professional. [facility] disclaims any warranty or liability for your use of this information. Patient Education        Tdap (Tetanus, Diphtheria, Pertussis) Vaccine: What You Need to Know  Why get vaccinated? Tdap vaccine can prevent tetanus, diphtheria, and pertussis. Diphtheria and pertussis spread from person to person. Tetanus enters the body through cuts or wounds. TETANUS (T) causes painful stiffening of the muscles. Tetanus can lead to serious health problems, including being unable to open the mouth, having trouble swallowing and breathing, or death. DIPHTHERIA (D) can lead to difficulty breathing, heart failure, paralysis, or death. PERTUSSIS (aP), also known as \"whooping cough,\" can cause uncontrollable, violent coughing that makes it hard to breathe, eat, or drink. Pertussis can be extremely serious especially in babies and young children, causing pneumonia, convulsions, brain damage, or death. In teens and adults, it can cause weight loss, loss of bladder control, passing out, and rib fractures from severe coughing. Tdap vaccine  Tdap is only for children 7 years and older, adolescents, and adults. Adolescents should receive a single dose of Tdap, preferably at age 6 or 15 years. Pregnant people should get a dose of Tdap during every pregnancy, preferably during the early part of the third trimester, to help protect the  from pertussis. Infants are most at risk for severe, life-threatening complications from pertussis. Adults who have never received Tdap should get a dose of Tdap. Also, adults should receive a booster dose of either Tdap or Td (a different vaccine that protects against tetanus and diphtheria but not pertussis) every 10 years, or after 5 years in the case of a severe or dirty wound or burn. Tdap may be given at the same time as other vaccines.   Talk with your health care provider  Tell your vaccination provider if the person getting the vaccine:  Has had an allergic reaction after a previous dose of any vaccine that protects against tetanus, diphtheria, or pertussis, or has any severe, life-threatening allergies  Has had a coma, decreased level of consciousness, or prolonged seizures within 7 days after a previous dose of any pertussis vaccine (DTP, DTaP, or Tdap)  Has seizures or another nervous system problem  Has ever had Guillain-Barré Syndrome (also called \"GBS\")  Has had severe pain or swelling after a previous dose of any vaccine that protects against tetanus or diphtheria  In some cases, your health care provider may decide to postpone Tdap vaccination until a future visit. People with minor illnesses, such as a cold, may be vaccinated. People who are moderately or severely ill should usually wait until they recover before getting Tdap vaccine. Your health care provider can give you more information. Risks of a vaccine reaction  Pain, redness, or swelling where the shot was given, mild fever, headache, feeling tired, and nausea, vomiting, diarrhea, or stomachache sometimes happen after Tdap vaccination. People sometimes faint after medical procedures, including vaccination. Tell your provider if you feel dizzy or have vision changes or ringing in the ears. As with any medicine, there is a very remote chance of a vaccine causing a severe allergic reaction, other serious injury, or death. What if there is a serious problem? An allergic reaction could occur after the vaccinated person leaves the clinic. If you see signs of a severe allergic reaction (hives, swelling of the face and throat, difficulty breathing, a fast heartbeat, dizziness, or weakness), call 9-1-1 and get the person to the nearest hospital.  For other signs that concern you, call your health care provider. Adverse reactions should be reported to the Vaccine Adverse Event Reporting System (VAERS). Your health care provider will usually file this report, or you can do it yourself.  Visit the VAERS website at www.vaers. hhs.gov or call 9-170.706.6997. VAERS is only for reporting reactions, and VAERS staff members do not give medical advice. The National Vaccine Injury Compensation Program  The National Vaccine Injury Compensation Program (VICP) is a federal program that was created to compensate people who may have been injured by certain vaccines. Claims regarding alleged injury or death due to vaccination have a time limit for filing, which may be as short as two years. Visit the VICP website at www.hrsa.gov/vaccinecompensation or call 2-248.742.7151 to learn about the program and about filing a claim. How can I learn more? Ask your health care provider. Call your local or state health department. Visit the website of the Food and Drug Administration (FDA) for vaccine package inserts and additional information at www.fda.gov/vaccines-blood-biologics/vaccines. Contact the Centers for Disease Control and Prevention (CDC): Call 6-508.889.7830 (1-800-CDC-INFO) or  Visit CDC's website at www.cdc.gov/vaccines. Vaccine Information Statement  Tdap (Tetanus, Diphtheria, Pertussis) Vaccine  8/6/2021  [id number]  [facility] and [facility] for Disease Control and Prevention  Many vaccine information statements are available in [place] and other languages. See www.immunize.org/vis  Hojas de información sobre vacunas están disponibles en español y en muchos otros idiomas. Visite www.immunize.org/vis  Care instructions adapted under license by [facility] ([facility]). If you have questions about a medical condition or this instruction, always ask your healthcare professional. [place] [address] any warranty or liability for your use of this information. Patient Education        Getting Ready for Baby: Care Instructions  Your Care Instructions     Congratulations! You are heading into an exciting adventure.  You can make your entry into parenthood a little less hectic by planning now and gathering some of the items you will need. You will be too busy (and probably too tired) to do much shopping after the baby arrives. These tips will help you get started. Some items you may need to buy, but do not be shy about taking donations of clothes and other items from family and friends. Getting an early start can allow you to stock up over time, which might be easier on your wallet. Follow-up care is a key part of your treatment and safety. Be sure to make and go to all appointments, and call your doctor if you are having problems. What do you need to have at home? Freeze meals ahead  Try to cook and freeze meals in the weeks before the baby comes. Family and friends may be able to help cook meals. You may not have the time or the energy to cook in the first weeks after the baby arrives. Baby furniture and car seat  Make sure all the baby gear you buy meets safety standards set by the U.S. Consumer Product Safety Commission. Although most new items will likely meet these standards, older and used items may not. Equipment that has been used before may not be safe. Cribs should have less than 2.4 in. (6.1 cm) of space between the slats. Lower the mattress as your baby grows. Your baby may try to climb out of the crib if the mattress is not lowered. Baby walkers should not be used, according to recommendations from the [employer] of Pediatric[employer]. Playpens should have spaces in the mesh material that are no greater than 0.25 in. (0.64 cm) across. Wood slats should be less than 2.4 in. (6.1 cm) apart. Look for a playpen or travel crib that has top rails that lock into position. This may prevent the rail from collapsing. Stroller wheels should be in a locked position before you put your baby in the stroller. Use the straps to secure your baby so that your baby cannot lean out as he or she gets more mobile. Fasten any toys or bumpers so that they do not fall on your baby.  Remove these items as soon as your infant can sit or get up on all fours. Make sure releases and hinges are out of your baby's reach, especially if the stroller can fold. High chairs should have a wide, stable base. Do not use booster seats that attach to the table. Always make sure the high chair is locked in the upright position before use. Use the safety straps, and stay with your baby at all times while he or she is in the high chair. Changing tables should have a railing on all sides that is 2 in. (5 cm) high. Try to use a slightly indented changing pad. Always use the safety strap, and keep one hand on your baby. Have diapers and other items handy, but keep them out of your baby's reach. (If you do not have a changing table, you can change your baby on a towel on the floor.)  Get a new car seat and put your baby in it every time you drive with him or her. Getting a new seat is the best way to make sure that a seat meets safety standards and has not already been used in an accident. Make sure the car seat is properly installed. See the maker's instructions. If you are not sure how to put in the car seat, have your car seat checked at a police station. Make sure the car seat is the right fit for your child's current age, weight, or height. For safety, it is very important to have a car seat that fits your child. And it is safest for the seat to face the rear until your child outgrows the height and weight requirement by the car seat . Put the car seat in a rear seat, not in the front passenger seat. This will keep your child from getting hurt by the air bag in an accident. If you have rear side air bags, put your child's car seat in the middle seat. For more information about car seats, call the [employer] at 6-456.149.1079. Baby care items  Start stocking up on disposable diapers (unless you plan to use cloth diapers) and wipes.  If you want, you can buy a few packages of  diapers, which come with a cutout in the front so the diaper does not touch the baby's umbilical cord stump. You also can buy regular infant diapers and roll down the front. You may get some baby clothes from family and friends at baby showers and after the baby arrives. Ask for (or buy) a few basics to get you started. Get several sleep sacks or nightgowns, T-shirts that snap at the crotch (called \"onesies\"), small blankets to wrap the baby in (called receiving blankets), and one-piece outfits that snap or zip down one leg. This is so that you do not have to take off all the baby's clothes to change a diaper. Socks or booties are also a good idea to keep your baby's feet warm. Get a cotton cap or two. Newborns also need their heads covered to stay warm. Put together a kit of baby care items. Include diaper rash cream, baby nail clippers, a nasal bulb syringe (to help clear a stuffy nose), and baby wash, which also can be used as shampoo. If you plan to breastfeed, buy a couple of nursing bras. You can wear one while the other one is in the wash. Also, you may want get a nipple cream that contains lanolin. Some women also like to put nursing pads in their bras to prevent breast milk from leaking onto their clothes. If you plan to bottle-feed, buy several cans of formula and several bottles to get you started. Where can you learn more? Go to http://www.woods.com/ and enter T918 to learn more about \"Getting Ready for Baby: Care Instructions. \"  Current as of: August 3, 2022               Content Version: 13.5  © 0593-6971 [employer]. Care instructions adapted under license by [facility] ([facility]). If you have questions about a medical condition or this instruction, always ask your healthcare professional. [address] any warranty or liability for your use of this information.        Patient Education        Preparing for Childbirth: Care Instructions  Overview     You are getting close to the birth of your child. For months, you've been taking care of yourself and the baby. Now you can still take steps that will help you have a healthy labor and birth. You can take classes to help prepare for the birth. You also can talk with your doctor about what you would like to happen during your labor. Changes happen in the last 2 months of your pregnancy. Your baby becomes too big to move around easily inside the uterus and may seem to move less. At the end of your pregnancy, your baby probably will settle into a head-down position. You will likely feel some pressure in your pelvis as you get close to the birth. You may notice times when your belly tightens and becomes firm to the touch, then relaxes. These are called [place] contractions. They sometimes occur as often as every 10 to 20 minutes. These contractions usually stop when you are active. (True labor pains continue or increase if you move around.)  Rupture of your membranes (\"breaking of the water\") often is a sign that labor has started or is about to start. This happens when a hole or tear develops in the fluid-filled bag (amniotic sac) that surrounds and protects your baby. You may feel a huge gush of water or a steady trickle of fluid. Call your doctor or go to the hospital if you think this has happened. Contractions may start, or if you are already having contractions, they may get stronger. Follow-up care is a key part of your treatment and safety. Be sure to make and go to all appointments, and call your doctor if you are having problems. It's also a good idea to know your test results and keep a list of the medicines you take. How can you care for yourself at home? Get plenty of rest.  Take childbirth classes with your partner or . You will learn relaxation exercises that are helpful during labor. You also will learn what you can do to manage labor pain.   If you have other children, take a class to learn how to help them adjust to the new baby. Develop a written birth plan, if you choose to, keeping in mind that labor is hard to predict and your plan may change after labor begins. Some of what a birth plan may address includes:  Where you would like to have your baby. This includes the building and the room. It could be the hospital's birthing room, a separate birthing center, or your own home. Who you would like to assist with delivery of your baby. You may want your doctor, an obstetrician, or a certified nurse-midwife. Some women prefer a lay midwife or a  to provide support before and after delivery. Whether you want a , nurse, midwife, or  to give you support from early labor until after childbirth. What comfort measures you want. You may have to choose between walking around during labor or having the security of a heart monitor for your baby. You may want to listen to music during labor. You may know what position you want to be in (such as sitting, squatting, or reclining) for pushing. What pain relief you would like. There are several choices, so be sure to talk with your doctor about them. How you want you and your baby to spend the first few hours after birth. You may want to keep your baby with you for at least 1 hour after birth for bonding and early breastfeeding. You may want the hospital to delay some infant care steps, such as a vitamin K injection, so that you have calming time with your baby. You may not want visitors, or you may want other family members there. Know the early signs of labor, such as a steady ache low in your back. If you are going to a hospital or clinic to have your baby, have your bag ready to go. Pack a nightgown, robe, panties, socks, and slippers. You may want to bring your own soap, shampoo, brush, toothbrush, and toothpaste. Bring your own self-adhesive sanitary pads.   In your baby's bag, bring an outfit, small blanket, and diapers. Have your car seat ready to go. When should you call for help? Watch closely for changes in your health, and be sure to contact your doctor if you have any questions about preparing for childbirth. Where can you learn more? Go to http://www.woods.com/ and enter H862 to learn more about \"Preparing for Childbirth: Care Instructions. \"  Current as of: February 23, 2022               Content Version: 13.5  © 2006-2022 Healthwise[employer]. Care instructions adapted under license by [facility] ([facility]). If you have questions about a medical condition or this instruction, always ask your healthcare professional. [address] any warranty or liability for your use of this information. Alert and oriented to person, place and time

## 2023-03-06 ENCOUNTER — ROUTINE PRENATAL (OUTPATIENT)
Dept: OBGYN CLINIC | Age: 28
End: 2023-03-06

## 2023-03-06 VITALS — SYSTOLIC BLOOD PRESSURE: 110 MMHG | DIASTOLIC BLOOD PRESSURE: 70 MMHG | WEIGHT: 189 LBS | BODY MASS INDEX: 30.51 KG/M2

## 2023-03-06 DIAGNOSIS — Z34.83 ENCOUNTER FOR SUPERVISION OF OTHER NORMAL PREGNANCY IN THIRD TRIMESTER: ICD-10-CM

## 2023-03-06 DIAGNOSIS — Z3A.32 32 WEEKS GESTATION OF PREGNANCY: Primary | ICD-10-CM

## 2023-03-06 DIAGNOSIS — O34.211 MATERNAL CARE FOR LOW TRANSVERSE SCAR FROM PREVIOUS CESAREAN DELIVERY: ICD-10-CM

## 2023-03-06 PROCEDURE — 0502F SUBSEQUENT PRENATAL CARE: CPT | Performed by: ADVANCED PRACTICE MIDWIFE

## 2023-03-06 NOTE — PROGRESS NOTES
Patient presents today for routine prenatal care. Pt denies any vaginal leaking bleeding or contractions. + Fetal movement. Threw back out yesterday.

## 2023-03-24 NOTE — PATIENT INSTRUCTIONS
home?  If your doctor has prescribed antibiotics, take them as directed. Do not stop taking them just because you feel better. You need to take the full course of antibiotics. Tell your doctor if you are allergic to any antibiotic. If you go into labor, or your water breaks, go to the hospital. Your doctor will give you antibiotics to help protect your baby from infection. Tell the doctors and nurses if you have an allergy to penicillin. Tell the doctors and nurses at the hospital that you tested positive for group B strep. When should you call for help? Call your doctor now or seek immediate medical care if:    You have symptoms of a urinary tract infection. These may include:  Pain or burning when you urinate. A frequent need to urinate without being able to pass much urine. Pain in the flank, which is just below the rib cage and above the waist on either side of the back. Blood in your urine. A fever. You think you are in labor or your water has broken. You have pain in your belly or pelvis. Watch closely for changes in your health, and be sure to contact your doctor if you have any problems. Where can you learn more? Go to http://Push IO.rodriguez.com/ and enter M001 to learn more about \"Group B Strep During Pregnancy: Care Instructions. \"  Current as of: October 31, 2022               Content Version: 13.6  © 2006-2023 Healthwise, [employer]. Care instructions adapted under license by [facility] ([facility]). If you have questions about a medical condition or this instruction, always ask your healthcare professional. [place] [address] any warranty or liability for your use of this information. Patient Education        Early Stage of Labor at Home: Care Instructions  Overview     If you came to the hospital while in early labor, your doctor may ask you to labor at home until your contractions are stronger. Many women stay at home during early labor.  This is often the

## 2023-03-29 ENCOUNTER — ROUTINE PRENATAL (OUTPATIENT)
Dept: OBGYN CLINIC | Age: 28
End: 2023-03-29

## 2023-03-29 VITALS — DIASTOLIC BLOOD PRESSURE: 80 MMHG | SYSTOLIC BLOOD PRESSURE: 110 MMHG | WEIGHT: 193 LBS | BODY MASS INDEX: 31.15 KG/M2

## 2023-03-29 DIAGNOSIS — O34.211 MATERNAL CARE FOR LOW TRANSVERSE SCAR FROM PREVIOUS CESAREAN DELIVERY: ICD-10-CM

## 2023-03-29 DIAGNOSIS — Z3A.35 35 WEEKS GESTATION OF PREGNANCY: Primary | ICD-10-CM

## 2023-03-29 DIAGNOSIS — Z34.83 ENCOUNTER FOR SUPERVISION OF OTHER NORMAL PREGNANCY IN THIRD TRIMESTER: ICD-10-CM

## 2023-03-29 DIAGNOSIS — Z36.85 ENCOUNTER FOR ANTENATAL SCREENING FOR STREPTOCOCCUS B: ICD-10-CM

## 2023-03-29 PROCEDURE — 99213 OFFICE O/P EST LOW 20 MIN: CPT | Performed by: ADVANCED PRACTICE MIDWIFE

## 2023-03-29 NOTE — PROGRESS NOTES
CNM Prenatal Office Note  Subjective: Venu Monet is here for a return obstetrical visit. Today she is 35w2d weeks EGA. Pt does feel fetal movement regularly. Denies headaches, RUQ pain, or visual changes as well as contractions, vaginal bleeding or leaking of fluid. Problems/complaints today:  none  Objective: Mother's Prenatal Vitals  BP: 110/80  Weight: 193 lb (87.5 kg)  Patient Position: Sitting  Prenatal Fetal Information  Fetal HR: 135  Movement: Present  Pt is A&Ox3, in no acute distress. Normocephalic, atraumatic. PERRL. Resp even and non-labored. Skin pink, warm & dry. Gravid abdomen. ERAZO's well. Gait steady. Assessment:    IUP at 35w2d wks      Diagnosis Orders   1. 35 weeks gestation of pregnancy        2. Encounter for  screening for Streptococcus B        3. Encounter for supervision of other normal pregnancy in third trimester        4. Maternal care for low transverse scar from previous  delivery          Plan:   Problems/Complatins Management Plan:  none  Routine OB Management Plan:  GBS collected and labor education completed. Encourage perineal massage. Discussed pain management options during labor and birth plan. Pt counseled on counseled on balanced nutrition, adequate fluid intake, taking PNV daily, and exercise along with GHTN precautions, Kick count, and  labor  Continue with routine prenatal care.  surveillance not indicated  RTC in 1 wks for prenatal visit    MEDICATIONS:  No orders of the defined types were placed in this encounter. ORDERS:  No orders of the defined types were placed in this encounter. More than 50% of this 20 min visit was education and counseling. ORDERS:  No orders of the defined types were placed in this encounter.

## 2023-03-29 NOTE — PROGRESS NOTES
Patient presents today for routine prenatal care. Pt denies any vaginal leaking bleeding or contractions. + Fetal movement. Feels like baby has slowed down some but has been very busy.

## 2023-03-30 LAB — GP B STREP DNA SPEC QL NAA+PROBE: NOT DETECTED

## 2023-04-04 ENCOUNTER — TELEPHONE (OUTPATIENT)
Dept: OBGYN CLINIC | Age: 28
End: 2023-04-04

## 2023-04-17 ENCOUNTER — ROUTINE PRENATAL (OUTPATIENT)
Dept: OBGYN CLINIC | Age: 28
End: 2023-04-17

## 2023-04-17 VITALS
BODY MASS INDEX: 30.99 KG/M2 | DIASTOLIC BLOOD PRESSURE: 75 MMHG | WEIGHT: 192 LBS | SYSTOLIC BLOOD PRESSURE: 114 MMHG | HEART RATE: 65 BPM

## 2023-04-17 DIAGNOSIS — Z34.83 ENCOUNTER FOR SUPERVISION OF OTHER NORMAL PREGNANCY IN THIRD TRIMESTER: ICD-10-CM

## 2023-04-17 DIAGNOSIS — Z3A.38 38 WEEKS GESTATION OF PREGNANCY: Primary | ICD-10-CM

## 2023-04-17 DIAGNOSIS — O34.211 MATERNAL CARE FOR LOW TRANSVERSE SCAR FROM PREVIOUS CESAREAN DELIVERY: ICD-10-CM

## 2023-04-17 PROCEDURE — 0502F SUBSEQUENT PRENATAL CARE: CPT | Performed by: ADVANCED PRACTICE MIDWIFE

## 2023-04-17 NOTE — PROGRESS NOTES
CNM Prenatal Office Note  Subjective: Toshia Tang is here for a return obstetrical visit. Today she is 38w0d weeks EGA. Pt does feel fetal movement regularly. Denies headaches, RUQ pain, or visual changes. Denies vaginal bleeding or leaking of fluid. Problems/Complaints today:  none  Objective: Mother's Prenatal Vitals  BP: 114/75  Weight: 192 lb (87.1 kg)  Heart Rate: 65  Patient Position: Sitting  Prenatal Fetal Information  Fetal HR: 137  Movement: Present  Pt is A&Ox3, in no acute distress. Normocephalic, atraumatic. PERRL. Resp even and non-labored. Skin pink, warm & dry. Gravid abdomen. ERAZO's well. Gait steady. Assessment:    IUP at 38w0d wks      Diagnosis Orders   1. 38 weeks gestation of pregnancy        2. Encounter for supervision of other normal pregnancy in third trimester        3. Maternal care for low transverse scar from previous  delivery          Plan:  Problems/Complaints Management Plan:  None  Routine OB Management Plan:  Pt counseled on balanced nutrition, adequate fluid intake, taking PNV daily, and exercise along with labor precautions, GHTN precautions, and Kick count  Continue with routine prenatal care.  surveillance not indicated  RTC in 1 wks for prenatal visit    MEDICATIONS:  No orders of the defined types were placed in this encounter. ORDERS:  No orders of the defined types were placed in this encounter. More than 50% of this 20 min visit was education and counseling.

## 2023-04-17 NOTE — PROGRESS NOTES
Patient presents today for routine prenatal care. Pt denies any vaginal leaking bleeding or contractions. + Fetal movement. She was having bad contractions the other morning, woke up to it, locked up, took her breath away. She had to breathe through them. They lasted for an hour and they were 2.5- 5 min apart.

## 2023-04-24 ENCOUNTER — ROUTINE PRENATAL (OUTPATIENT)
Dept: OBGYN CLINIC | Age: 28
End: 2023-04-24

## 2023-04-24 VITALS
DIASTOLIC BLOOD PRESSURE: 70 MMHG | BODY MASS INDEX: 31.47 KG/M2 | HEART RATE: 69 BPM | WEIGHT: 195 LBS | SYSTOLIC BLOOD PRESSURE: 105 MMHG

## 2023-04-24 DIAGNOSIS — Z34.83 ENCOUNTER FOR SUPERVISION OF OTHER NORMAL PREGNANCY IN THIRD TRIMESTER: ICD-10-CM

## 2023-04-24 DIAGNOSIS — O34.211 MATERNAL CARE FOR LOW TRANSVERSE SCAR FROM PREVIOUS CESAREAN DELIVERY: ICD-10-CM

## 2023-04-24 DIAGNOSIS — Z3A.39 39 WEEKS GESTATION OF PREGNANCY: Primary | ICD-10-CM

## 2023-04-24 PROCEDURE — 0502F SUBSEQUENT PRENATAL CARE: CPT | Performed by: NURSE PRACTITIONER

## 2023-04-24 NOTE — PROGRESS NOTES
CNM Prenatal Office Note  Subjective: Adair Cadet is here for a return obstetrical visit. Today she is 39w0d weeks EGA. She is doing well, taking her PNV as directed, and has no complaints. She  does not have vaginal bleeding, n/v, syncope, or headaches. Pt does feel fetal movement regularly. Objective: Mother's Prenatal Vitals  BP: 105/70  Weight: 195 lb (88.5 kg)  Heart Rate: 69  Patient Position: Sitting  Prenatal Fetal Information  Fetal HR: 141  Movement: Present  Pt is A&Ox3, in no acute distress. Normocephalic, atraumatic. PERRL. Resp even and non-labored. Skin pink, warm & dry. Gravid abdomen. ERAZO's well. Gait steady. Assessment:    IUP at 39w0d wks      Diagnosis Orders   1. 39 weeks gestation of pregnancy        2. Encounter for supervision of other normal pregnancy in third trimester        3. Maternal care for low transverse scar from previous  delivery          Plan:  Pt counseled on balanced nutrition, adequate fluid intake, taking PNV daily, and exercise along with labor precautions, GHTN precautions, and Kick count  Continue with routine prenatal care.  surveillance not indicated  RTC in 1 wks for prenatal visit    MEDICATIONS:  No orders of the defined types were placed in this encounter. ORDERS:  No orders of the defined types were placed in this encounter. More than 50% of this 20 min visit was education and counseling.

## 2023-04-24 NOTE — PATIENT INSTRUCTIONS
Patient Education        Week 44 of Your Pregnancy: Care Instructions    [place] babies can look different than what you see in pictures or movies. Their heads can be a strange shape right after birth. And they may have swollen eyes and red marks on their faces. You can still get pregnant even if you are breastfeeding. If you don't want to get pregnant, use birth control. Tips for Week 39 of Pregnancy  Prepare to breastfeed. Continue to eat healthy foods. Keep taking your prenatal vitamins during breastfeeding if your doctor recommends it. Talk to your doctor before taking any medicines or supplements. Choose the right birth control for you. Intrauterine devices (IUDs) are placed in the uterus. If you plan ahead, the IUD can be placed right after giving birth. They work for years. Hormonal implants are placed under the skin of the arm. They also work for years. Depo-Provera is a shot. You get it every 3 months. Birth control pills can be used. They're taken every day. Tubal ligation (tying your tubes) and vasectomy are surgeries. They're permanent. Diaphragms, spermicide, and condoms must be used each time you have sex. If you used a diaphragm before, you should get refitted after the baby is born. Follow-up care is a key part of your treatment and safety. Be sure to make and go to all appointments, and call your doctor if you are having problems. It's also a good idea to know your test results and keep a list of the medicines you take. Where can you learn more? Go to http://www.rodriguez.com/ and enter A811 to learn more about \"Week 39 of Your Pregnancy: Care Instructions. \"  Current as of: 2022               Content Version: 13.6  © 3518-8988 [employer]. Care instructions adapted under license by [facility] ([facility]).  If you have questions about a medical condition or this instruction, always ask your healthcare professional. [address]

## 2023-04-27 ENCOUNTER — TELEPHONE (OUTPATIENT)
Dept: OBGYN CLINIC | Age: 28
End: 2023-04-27

## 2023-05-01 ENCOUNTER — ROUTINE PRENATAL (OUTPATIENT)
Dept: OBGYN CLINIC | Age: 28
End: 2023-05-01

## 2023-05-01 VITALS
HEART RATE: 66 BPM | WEIGHT: 196 LBS | BODY MASS INDEX: 31.64 KG/M2 | DIASTOLIC BLOOD PRESSURE: 75 MMHG | SYSTOLIC BLOOD PRESSURE: 123 MMHG

## 2023-05-01 DIAGNOSIS — O34.211 MATERNAL CARE FOR LOW TRANSVERSE SCAR FROM PREVIOUS CESAREAN DELIVERY: ICD-10-CM

## 2023-05-01 DIAGNOSIS — Z34.83 ENCOUNTER FOR SUPERVISION OF OTHER NORMAL PREGNANCY IN THIRD TRIMESTER: ICD-10-CM

## 2023-05-01 DIAGNOSIS — Z3A.40 40 WEEKS GESTATION OF PREGNANCY: Primary | ICD-10-CM

## 2023-05-01 PROCEDURE — 59025 FETAL NON-STRESS TEST: CPT | Performed by: NURSE PRACTITIONER

## 2023-05-01 PROCEDURE — 0502F SUBSEQUENT PRENATAL CARE: CPT | Performed by: NURSE PRACTITIONER

## 2023-05-01 NOTE — PATIENT INSTRUCTIONS
Patient Education        Weeks 40 to 39 of Your Pregnancy: Care Instructions  By week 36, you've reached your due date. Your baby could be coming any day. Most babies born after their due dates are healthy. But you may have tests to make sure everything is okay. If you feel stressed, you could try a meditation exercise, such as guided imagery. It may help you relax. If you are past 41 weeks, your doctor may measure the amount of fluid that surrounds your baby. They may also test your baby's movement and heart rate. If you don't start labor on your own by 41 or 42 weeks, your doctor may want to start (induce) labor. If there are other concerns, your doctor may talk to you about a . To start (induce) your labor, your doctor may do any of these things. Place a narrow tube with a small balloon on the end (balloon catheter) into your cervix. The doctor inflates the balloon. This helps your cervix open (dilate). Sweep the membranes (separate the lining of the amniotic sac from the uterus). This helps the uterus make a chemical that can start contractions. \"Break your water\" (rupture the amniotic sac). This may be done if your cervix has started to open. Use medicines. They may be used to soften the cervix or start contractions. How to do guided imagery    Close your eyes, and take a few deep breaths. Picture a peaceful setting, such as a beach or a meadow. Add a winding path. Follow the path until you feel more and more relaxed. Take a few minutes to breathe slowly and feel the calm. When you are ready, slowly take yourself back to the present. Count to 3, and open your eyes. Follow-up care is a key part of your treatment and safety. Be sure to make and go to all appointments, and call your doctor if you are having problems. It's also a good idea to know your test results and keep a list of the medicines you take. Where can you learn more?   Go to

## 2023-05-01 NOTE — PROGRESS NOTES
CNM Prenatal Office Note  Subjective: Dino Rodas is here for a return obstetrical visit. Today she is 40w0d weeks EGA. She is doing well, taking her PNV as directed, and has no complaints. She  does not have vaginal bleeding, n/v, syncope, or headaches. Pt does feel fetal movement regularly. Objective: Mother's Prenatal Vitals  BP: 123/75  Weight: 196 lb (88.9 kg)  Heart Rate: 66  Patient Position: Sitting  Prenatal Fetal Information  Fetal HR:   Movement: Present  Pt is A&Ox3, in no acute distress. Normocephalic, atraumatic. PERRL. Resp even and non-labored. Skin pink, warm & dry. Gravid abdomen. ERAZO's well. Gait steady. Assessment:    IUP at 40w0d wks      Diagnosis Orders   1. 40 weeks gestation of pregnancy  16952 - MA FETAL NON-STRESS TEST      2. Encounter for supervision of other normal pregnancy in third trimester  33697 - MA FETAL NON-STRESS TEST      3. Maternal care for low transverse scar from previous  delivery          Plan:  Pt counseled on balanced nutrition, adequate fluid intake, taking PNV daily, and exercise along with labor precautions, GHTN precautions, and Kick count  Continue with routine prenatal care.  surveillance indicated for post-dates  RTC in 3 wks for prenatal visit    MEDICATIONS:  No orders of the defined types were placed in this encounter. ORDERS:  Orders Placed This Encounter   Procedures    94014 - MA FETAL NON-STRESS TEST       More than 50% of this 20 min visit was education and counseling.

## 2023-05-03 ENCOUNTER — ROUTINE PRENATAL (OUTPATIENT)
Dept: OBGYN CLINIC | Age: 28
End: 2023-05-03

## 2023-05-03 ENCOUNTER — HOSPITAL ENCOUNTER (INPATIENT)
Age: 28
LOS: 2 days | Discharge: HOME | End: 2023-05-05
Attending: ADVANCED PRACTICE MIDWIFE | Admitting: ADVANCED PRACTICE MIDWIFE

## 2023-05-03 VITALS
SYSTOLIC BLOOD PRESSURE: 111 MMHG | WEIGHT: 194 LBS | HEART RATE: 70 BPM | BODY MASS INDEX: 31.31 KG/M2 | DIASTOLIC BLOOD PRESSURE: 76 MMHG

## 2023-05-03 DIAGNOSIS — Z34.83 ENCOUNTER FOR SUPERVISION OF OTHER NORMAL PREGNANCY IN THIRD TRIMESTER: Primary | ICD-10-CM

## 2023-05-03 DIAGNOSIS — Z3A.40 40 WEEKS GESTATION OF PREGNANCY: ICD-10-CM

## 2023-05-03 DIAGNOSIS — O34.211 MATERNAL CARE FOR LOW TRANSVERSE SCAR FROM PREVIOUS CESAREAN DELIVERY: ICD-10-CM

## 2023-05-03 PROBLEM — O34.219 VBAC (VAGINAL BIRTH AFTER CESAREAN): Status: ACTIVE | Noted: 2023-05-03

## 2023-05-03 LAB
ABO + RH BLD: NORMAL
AMPHET UR QL SCN: NEGATIVE
BARBITURATES UR QL SCN: NEGATIVE
BASOPHILS # BLD: 0 K/UL (ref 0–0.2)
BASOPHILS NFR BLD: 0.2 % (ref 0–1)
BENZODIAZ UR QL SCN: NEGATIVE
BLD GP AB SCN SERPL QL: NORMAL
BUPRENORPHINE URINE: NEGATIVE
CANNABINOIDS UR QL SCN: NEGATIVE
COCAINE UR QL SCN: NEGATIVE
DRUG SCREEN COMMENT UR-IMP: NORMAL
EOSINOPHIL # BLD: 0.1 K/UL (ref 0–0.6)
EOSINOPHIL NFR BLD: 0.6 % (ref 0–5)
ERYTHROCYTE [DISTWIDTH] IN BLOOD BY AUTOMATED COUNT: 13.8 % (ref 11.5–14.5)
HCT VFR BLD AUTO: 34.6 % (ref 37–47)
HGB BLD-MCNC: 11 G/DL (ref 12–16)
IMM GRANULOCYTES # BLD: 0.1 K/UL
LYMPHOCYTES # BLD: 1.4 K/UL (ref 1.1–4.5)
LYMPHOCYTES NFR BLD: 15.2 % (ref 20–40)
MCH RBC QN AUTO: 28.4 PG (ref 27–31)
MCHC RBC AUTO-ENTMCNC: 31.8 G/DL (ref 33–37)
MCV RBC AUTO: 89.2 FL (ref 81–99)
METHADONE UR QL SCN: NEGATIVE
METHAMPHETAMINE, URINE: NEGATIVE
MONOCYTES # BLD: 0.5 K/UL (ref 0–0.9)
MONOCYTES NFR BLD: 5.5 % (ref 0–10)
NEUTROPHILS # BLD: 7.1 K/UL (ref 1.5–7.5)
NEUTS SEG NFR BLD: 77.9 % (ref 50–65)
OPIATES UR QL SCN: NEGATIVE
OXYCODONE UR QL SCN: NEGATIVE
PCP UR QL SCN: NEGATIVE
PLATELET # BLD AUTO: 182 K/UL (ref 130–400)
PMV BLD AUTO: 10.1 FL (ref 9.4–12.3)
PROPOXYPH UR QL SCN: NEGATIVE
RBC # BLD AUTO: 3.88 M/UL (ref 4.2–5.4)
TRICYCLIC, URINE: NEGATIVE
WBC # BLD AUTO: 9.1 K/UL (ref 4.8–10.8)

## 2023-05-03 PROCEDURE — 80306 DRUG TEST PRSMV INSTRMNT: CPT

## 2023-05-03 PROCEDURE — 1220000000 HC SEMI PRIVATE OB R&B

## 2023-05-03 PROCEDURE — 6360000002 HC RX W HCPCS: Performed by: ADVANCED PRACTICE MIDWIFE

## 2023-05-03 PROCEDURE — 86850 RBC ANTIBODY SCREEN: CPT

## 2023-05-03 PROCEDURE — 0502F SUBSEQUENT PRENATAL CARE: CPT | Performed by: ADVANCED PRACTICE MIDWIFE

## 2023-05-03 PROCEDURE — 36415 COLL VENOUS BLD VENIPUNCTURE: CPT

## 2023-05-03 PROCEDURE — 7200000001 HC VAGINAL DELIVERY

## 2023-05-03 PROCEDURE — 86592 SYPHILIS TEST NON-TREP QUAL: CPT

## 2023-05-03 PROCEDURE — 85025 COMPLETE CBC W/AUTO DIFF WBC: CPT

## 2023-05-03 PROCEDURE — 59025 FETAL NON-STRESS TEST: CPT | Performed by: ADVANCED PRACTICE MIDWIFE

## 2023-05-03 PROCEDURE — 2580000003 HC RX 258: Performed by: ADVANCED PRACTICE MIDWIFE

## 2023-05-03 PROCEDURE — 86900 BLOOD TYPING SEROLOGIC ABO: CPT

## 2023-05-03 PROCEDURE — 86901 BLOOD TYPING SEROLOGIC RH(D): CPT

## 2023-05-03 PROCEDURE — 3E0334Z INTRODUCTION OF SERUM, TOXOID AND VACCINE INTO PERIPHERAL VEIN, PERCUTANEOUS APPROACH: ICD-10-PCS | Performed by: ADVANCED PRACTICE MIDWIFE

## 2023-05-03 RX ORDER — SODIUM CHLORIDE, SODIUM LACTATE, POTASSIUM CHLORIDE, AND CALCIUM CHLORIDE .6; .31; .03; .02 G/100ML; G/100ML; G/100ML; G/100ML
500 INJECTION, SOLUTION INTRAVENOUS PRN
Status: DISCONTINUED | OUTPATIENT
Start: 2023-05-03 | End: 2023-05-04

## 2023-05-03 RX ORDER — CARBOPROST TROMETHAMINE 250 UG/ML
250 INJECTION, SOLUTION INTRAMUSCULAR PRN
Status: DISCONTINUED | OUTPATIENT
Start: 2023-05-03 | End: 2023-05-05 | Stop reason: HOSPADM

## 2023-05-03 RX ORDER — IBUPROFEN 600 MG/1
600 TABLET ORAL EVERY 6 HOURS PRN
Status: DISCONTINUED | OUTPATIENT
Start: 2023-05-03 | End: 2023-05-04

## 2023-05-03 RX ORDER — FERROUS SULFATE 325(65) MG
325 TABLET ORAL 2 TIMES DAILY WITH MEALS
Status: DISCONTINUED | OUTPATIENT
Start: 2023-05-04 | End: 2023-05-05 | Stop reason: HOSPADM

## 2023-05-03 RX ORDER — DOCUSATE SODIUM 100 MG/1
100 CAPSULE, LIQUID FILLED ORAL 2 TIMES DAILY
Status: DISCONTINUED | OUTPATIENT
Start: 2023-05-03 | End: 2023-05-04 | Stop reason: SDUPTHER

## 2023-05-03 RX ORDER — CARBOPROST TROMETHAMINE 250 UG/ML
250 INJECTION, SOLUTION INTRAMUSCULAR PRN
Status: DISCONTINUED | OUTPATIENT
Start: 2023-05-03 | End: 2023-05-04 | Stop reason: SDUPTHER

## 2023-05-03 RX ORDER — SODIUM CHLORIDE 0.9 % (FLUSH) 0.9 %
5-40 SYRINGE (ML) INJECTION EVERY 12 HOURS SCHEDULED
Status: DISCONTINUED | OUTPATIENT
Start: 2023-05-03 | End: 2023-05-04 | Stop reason: SDUPTHER

## 2023-05-03 RX ORDER — ONDANSETRON 2 MG/ML
4 INJECTION INTRAMUSCULAR; INTRAVENOUS EVERY 6 HOURS PRN
Status: DISCONTINUED | OUTPATIENT
Start: 2023-05-03 | End: 2023-05-04 | Stop reason: SDUPTHER

## 2023-05-03 RX ORDER — BUTORPHANOL TARTRATE 1 MG/ML
1 INJECTION, SOLUTION INTRAMUSCULAR; INTRAVENOUS
Status: DISCONTINUED | OUTPATIENT
Start: 2023-05-03 | End: 2023-05-04

## 2023-05-03 RX ORDER — MISOPROSTOL 200 UG/1
800 TABLET ORAL PRN
Status: DISCONTINUED | OUTPATIENT
Start: 2023-05-03 | End: 2023-05-04 | Stop reason: SDUPTHER

## 2023-05-03 RX ORDER — SODIUM CHLORIDE 0.9 % (FLUSH) 0.9 %
5-40 SYRINGE (ML) INJECTION PRN
Status: DISCONTINUED | OUTPATIENT
Start: 2023-05-03 | End: 2023-05-05 | Stop reason: HOSPADM

## 2023-05-03 RX ORDER — METHYLERGONOVINE MALEATE 0.2 MG/ML
200 INJECTION INTRAVENOUS PRN
Status: DISCONTINUED | OUTPATIENT
Start: 2023-05-03 | End: 2023-05-04 | Stop reason: SDUPTHER

## 2023-05-03 RX ORDER — ONDANSETRON 4 MG/1
8 TABLET, ORALLY DISINTEGRATING ORAL EVERY 8 HOURS PRN
Status: DISCONTINUED | OUTPATIENT
Start: 2023-05-03 | End: 2023-05-05 | Stop reason: HOSPADM

## 2023-05-03 RX ORDER — MISOPROSTOL 200 UG/1
800 TABLET ORAL PRN
Status: DISCONTINUED | OUTPATIENT
Start: 2023-05-03 | End: 2023-05-05 | Stop reason: HOSPADM

## 2023-05-03 RX ORDER — MISOPROSTOL 200 UG/1
200 TABLET ORAL PRN
Status: DISCONTINUED | OUTPATIENT
Start: 2023-05-03 | End: 2023-05-05 | Stop reason: HOSPADM

## 2023-05-03 RX ORDER — SODIUM CHLORIDE, SODIUM LACTATE, POTASSIUM CHLORIDE, CALCIUM CHLORIDE 600; 310; 30; 20 MG/100ML; MG/100ML; MG/100ML; MG/100ML
INJECTION, SOLUTION INTRAVENOUS CONTINUOUS
Status: DISCONTINUED | OUTPATIENT
Start: 2023-05-03 | End: 2023-05-04

## 2023-05-03 RX ORDER — SODIUM CHLORIDE, SODIUM LACTATE, POTASSIUM CHLORIDE, CALCIUM CHLORIDE 600; 310; 30; 20 MG/100ML; MG/100ML; MG/100ML; MG/100ML
INJECTION, SOLUTION INTRAVENOUS CONTINUOUS
Status: DISCONTINUED | OUTPATIENT
Start: 2023-05-03 | End: 2023-05-05 | Stop reason: HOSPADM

## 2023-05-03 RX ORDER — SODIUM CHLORIDE 9 MG/ML
25 INJECTION, SOLUTION INTRAVENOUS PRN
Status: DISCONTINUED | OUTPATIENT
Start: 2023-05-03 | End: 2023-05-04

## 2023-05-03 RX ORDER — METHYLERGONOVINE MALEATE 0.2 MG/ML
200 INJECTION INTRAVENOUS PRN
Status: DISCONTINUED | OUTPATIENT
Start: 2023-05-03 | End: 2023-05-05 | Stop reason: HOSPADM

## 2023-05-03 RX ORDER — DOCUSATE SODIUM 100 MG/1
100 CAPSULE, LIQUID FILLED ORAL 2 TIMES DAILY
Status: DISCONTINUED | OUTPATIENT
Start: 2023-05-03 | End: 2023-05-05 | Stop reason: HOSPADM

## 2023-05-03 RX ORDER — ONDANSETRON 2 MG/ML
4 INJECTION INTRAMUSCULAR; INTRAVENOUS EVERY 6 HOURS PRN
Status: DISCONTINUED | OUTPATIENT
Start: 2023-05-03 | End: 2023-05-04

## 2023-05-03 RX ORDER — SODIUM CHLORIDE 0.9 % (FLUSH) 0.9 %
5-40 SYRINGE (ML) INJECTION PRN
Status: DISCONTINUED | OUTPATIENT
Start: 2023-05-03 | End: 2023-05-04 | Stop reason: SDUPTHER

## 2023-05-03 RX ORDER — SODIUM CHLORIDE, SODIUM LACTATE, POTASSIUM CHLORIDE, AND CALCIUM CHLORIDE .6; .31; .03; .02 G/100ML; G/100ML; G/100ML; G/100ML
1000 INJECTION, SOLUTION INTRAVENOUS PRN
Status: DISCONTINUED | OUTPATIENT
Start: 2023-05-03 | End: 2023-05-04

## 2023-05-03 RX ORDER — SODIUM CHLORIDE 9 MG/ML
INJECTION, SOLUTION INTRAVENOUS PRN
Status: DISCONTINUED | OUTPATIENT
Start: 2023-05-03 | End: 2023-05-05 | Stop reason: HOSPADM

## 2023-05-03 RX ORDER — SODIUM CHLORIDE 0.9 % (FLUSH) 0.9 %
5-40 SYRINGE (ML) INJECTION EVERY 12 HOURS SCHEDULED
Status: DISCONTINUED | OUTPATIENT
Start: 2023-05-03 | End: 2023-05-05 | Stop reason: HOSPADM

## 2023-05-03 RX ADMIN — Medication 166.7 ML: at 22:37

## 2023-05-03 RX ADMIN — Medication 1 MILLI-UNITS/MIN: at 18:16

## 2023-05-03 RX ADMIN — SODIUM CHLORIDE, POTASSIUM CHLORIDE, SODIUM LACTATE AND CALCIUM CHLORIDE: 600; 310; 30; 20 INJECTION, SOLUTION INTRAVENOUS at 18:14

## 2023-05-03 RX ADMIN — BUTORPHANOL TARTRATE 1 MG: 1 INJECTION, SOLUTION INTRAMUSCULAR; INTRAVENOUS at 21:51

## 2023-05-03 ASSESSMENT — PAIN SCALES - GENERAL
PAINLEVEL_OUTOF10: 8
PAINLEVEL_OUTOF10: 7

## 2023-05-03 ASSESSMENT — PAIN DESCRIPTION - LOCATION: LOCATION: ABDOMEN

## 2023-05-03 ASSESSMENT — PAIN - FUNCTIONAL ASSESSMENT: PAIN_FUNCTIONAL_ASSESSMENT: ACTIVITIES ARE NOT PREVENTED

## 2023-05-03 ASSESSMENT — PAIN DESCRIPTION - DESCRIPTORS: DESCRIPTORS: CRAMPING

## 2023-05-04 LAB
ABO + RH BLD: NORMAL
ERYTHROCYTE [DISTWIDTH] IN BLOOD BY AUTOMATED COUNT: 13.8 % (ref 11.5–14.5)
FETAL SCREEN: NORMAL
HCT VFR BLD AUTO: 33.7 % (ref 37–47)
HGB BLD-MCNC: 10.8 G/DL (ref 12–16)
MCH RBC QN AUTO: 28.3 PG (ref 27–31)
MCHC RBC AUTO-ENTMCNC: 32 G/DL (ref 33–37)
MCV RBC AUTO: 88.5 FL (ref 81–99)
PLATELET # BLD AUTO: 183 K/UL (ref 130–400)
PMV BLD AUTO: 10.1 FL (ref 9.4–12.3)
RBC # BLD AUTO: 3.81 M/UL (ref 4.2–5.4)
RHIG LOT NUMBER: NORMAL
RPR SER QL: NORMAL
WBC # BLD AUTO: 10.8 K/UL (ref 4.8–10.8)

## 2023-05-04 PROCEDURE — 85461 HEMOGLOBIN FETAL: CPT

## 2023-05-04 PROCEDURE — 1220000000 HC SEMI PRIVATE OB R&B

## 2023-05-04 PROCEDURE — 6370000000 HC RX 637 (ALT 250 FOR IP): Performed by: ADVANCED PRACTICE MIDWIFE

## 2023-05-04 PROCEDURE — 85027 COMPLETE CBC AUTOMATED: CPT

## 2023-05-04 PROCEDURE — 86901 BLOOD TYPING SEROLOGIC RH(D): CPT

## 2023-05-04 PROCEDURE — 86900 BLOOD TYPING SEROLOGIC ABO: CPT

## 2023-05-04 PROCEDURE — S9443 LACTATION CLASS: HCPCS

## 2023-05-04 PROCEDURE — 6360000002 HC RX W HCPCS: Performed by: ADVANCED PRACTICE MIDWIFE

## 2023-05-04 PROCEDURE — 36415 COLL VENOUS BLD VENIPUNCTURE: CPT

## 2023-05-04 RX ORDER — IBUPROFEN 400 MG/1
800 TABLET ORAL EVERY 8 HOURS PRN
Status: DISCONTINUED | OUTPATIENT
Start: 2023-05-04 | End: 2023-05-05 | Stop reason: HOSPADM

## 2023-05-04 RX ADMIN — IBUPROFEN 800 MG: 400 TABLET ORAL at 07:35

## 2023-05-04 RX ADMIN — FERROUS SULFATE TAB 325 MG (65 MG ELEMENTAL FE) 325 MG: 325 (65 FE) TAB at 07:35

## 2023-05-04 RX ADMIN — FERROUS SULFATE TAB 325 MG (65 MG ELEMENTAL FE) 325 MG: 325 (65 FE) TAB at 18:26

## 2023-05-04 RX ADMIN — HUMAN RHO(D) IMMUNE GLOBULIN 300 MCG: 300 INJECTION, SOLUTION INTRAMUSCULAR at 07:53

## 2023-05-04 RX ADMIN — DOCUSATE SODIUM 100 MG: 100 CAPSULE, LIQUID FILLED ORAL at 07:35

## 2023-05-04 RX ADMIN — IBUPROFEN 800 MG: 400 TABLET ORAL at 18:26

## 2023-05-04 ASSESSMENT — PAIN DESCRIPTION - LOCATION: LOCATION: ABDOMEN

## 2023-05-04 ASSESSMENT — PAIN DESCRIPTION - DESCRIPTORS: DESCRIPTORS: CRAMPING

## 2023-05-04 ASSESSMENT — PAIN SCALES - GENERAL
PAINLEVEL_OUTOF10: 2
PAINLEVEL_OUTOF10: 2

## 2023-05-04 NOTE — PROGRESS NOTES
CNM Prenatal Office Note  Subjective: Anthony Jeffers is here for a return obstetrical visit. Today she is 40w2d weeks EGA. Pt does feel fetal movement regularly. Denies headaches, RUQ pain, or visual changes. Denies vaginal bleeding or leaking of fluid. Problems/Complaints today:   none  Objective: Mother's Prenatal Vitals  BP: 111/76  Weight - Scale: 194 lb (88 kg)  Pulse: 70  Patient Position: Sitting  Prenatal Fetal Information  Fetal HR: [id number]  Movement: Present  Pt is A&Ox3, in no acute distress. Normocephalic, atraumatic. PERRL. Resp even and non-labored. Skin pink, warm & dry. Gravid abdomen. ERAZO's well. Gait steady. Assessment:    IUP at 40w2d wks      Diagnosis Orders   1. Encounter for supervision of other normal pregnancy in third trimester        2. 40 weeks gestation of pregnancy        3. Maternal care for low transverse scar from previous  delivery          Plan:  Problems/Complaints Management Plan:   none  Routine OB Management Plan:   Pt counseled on balanced nutrition, adequate fluid intake, taking PNV daily, and exercise along with labor precautions, GHTN precautions and Kick count   Continue with routine prenatal care.   surveillance indicated for post date pregnancy   Sent to LDR    MEDICATIONS:  No orders of the defined types were placed in this encounter. ORDERS:  No orders of the defined types were placed in this encounter. More than 50% of this 20 min visit was education and counseling.

## 2023-05-05 VITALS
BODY MASS INDEX: 31.18 KG/M2 | OXYGEN SATURATION: 100 % | HEART RATE: 59 BPM | TEMPERATURE: 97.5 F | SYSTOLIC BLOOD PRESSURE: 110 MMHG | RESPIRATION RATE: 18 BRPM | DIASTOLIC BLOOD PRESSURE: 75 MMHG | WEIGHT: 194 LBS | HEIGHT: 66 IN

## 2023-05-05 PROCEDURE — S9443 LACTATION CLASS: HCPCS

## 2023-05-05 PROCEDURE — 6370000000 HC RX 637 (ALT 250 FOR IP): Performed by: ADVANCED PRACTICE MIDWIFE

## 2023-05-05 PROCEDURE — 99024 POSTOP FOLLOW-UP VISIT: CPT

## 2023-05-05 RX ORDER — PSEUDOEPHEDRINE HCL 30 MG
100 TABLET ORAL 2 TIMES DAILY
Qty: 60 CAPSULE | Refills: 1 | Status: SHIPPED | OUTPATIENT
Start: 2023-05-05

## 2023-05-05 RX ORDER — FERROUS SULFATE 325(65) MG
325 TABLET ORAL 2 TIMES DAILY WITH MEALS
Qty: 30 TABLET | Refills: 3 | Status: SHIPPED | OUTPATIENT
Start: 2023-05-05

## 2023-05-05 RX ORDER — IBUPROFEN 800 MG/1
800 TABLET ORAL EVERY 8 HOURS PRN
Qty: 90 TABLET | Refills: 1 | Status: SHIPPED | OUTPATIENT
Start: 2023-05-05

## 2023-05-05 RX ADMIN — FERROUS SULFATE TAB 325 MG (65 MG ELEMENTAL FE) 325 MG: 325 (65 FE) TAB at 08:04

## 2023-05-05 RX ADMIN — DOCUSATE SODIUM 100 MG: 100 CAPSULE, LIQUID FILLED ORAL at 08:04

## 2023-05-08 ENCOUNTER — HOSPITAL ENCOUNTER (OUTPATIENT)
Dept: LABOR AND DELIVERY | Age: 28
Discharge: HOME | End: 2023-05-08

## 2023-05-16 NOTE — PATIENT INSTRUCTIONS
Patient Education        Nutrition for Breastfeeding: Care Instructions  Overview     If you are breastfeeding, your doctor may suggest that you eat more calories each day than otherwise recommended for a person of your height and weight.  Breastfeeding helps build the bond between you and your baby. It gives your baby excellent health benefits.  A healthy diet includes eating a variety of foods from the basic food groups: grains, vegetables, fruits, milk and milk products (such as cheese and yogurt), and meat and dried beans. Eating well during breastfeeding will ensure that you stay healthy.  Follow-up care is a key part of your treatment and safety. Be sure to make and go to all appointments, and call your doctor if you are having problems. It's also a good idea to know your test results and keep a list of the medicines you take.  How can you care for yourself at home?  Making good choices about what you eat and drink when you are breastfeeding can help you stay healthy. It can also help your baby stay healthy. Here are some things you can do.  Eat a variety of healthy foods.  This includes vegetables, fruits, milk products, whole grains, and protein.  Drink plenty of fluids.  Make water your first choice. If you have kidney, heart, or liver disease and have to limit fluids, talk with your doctor before you increase your fluids.  Avoid fish with high mercury.  This includes shark, swordfish, balbir mackerel, and marlin. It also includes orange roughy, bigeye tuna, and tilefish from the [place]. Instead, eat fish that is low in mercury. Choose canned light tuna, salmon, pollock, catfish, or shrimp.   Limit caffeine.  Things like coffee, tea, chocolate, and some sodas can contain caffeine. Caffeine can pass through breast milk to your baby. It may cause fussiness and sleep problems. Talk to your doctor about how much caffeine is safe for you.  Limit alcohol.  Alcohol can pass through breast milk to your baby.

## 2023-05-17 ENCOUNTER — POSTPARTUM VISIT (OUTPATIENT)
Dept: OBGYN CLINIC | Age: 28
End: 2023-05-17

## 2023-05-17 VITALS
WEIGHT: 178 LBS | SYSTOLIC BLOOD PRESSURE: 120 MMHG | BODY MASS INDEX: 28.61 KG/M2 | DIASTOLIC BLOOD PRESSURE: 80 MMHG | HEIGHT: 66 IN

## 2023-05-17 PROCEDURE — S3005 EVAL SELF-ASSESS DEPRESSION: HCPCS | Performed by: ADVANCED PRACTICE MIDWIFE

## 2023-05-17 PROCEDURE — 99213 OFFICE O/P EST LOW 20 MIN: CPT | Performed by: ADVANCED PRACTICE MIDWIFE

## 2023-05-17 RX ORDER — ACETAMINOPHEN AND CODEINE PHOSPHATE 120; 12 MG/5ML; MG/5ML
1 SOLUTION ORAL DAILY
Qty: 84 TABLET | Refills: 2 | Status: SHIPPED | OUTPATIENT
Start: 2023-05-17

## 2023-05-17 NOTE — PROGRESS NOTES
The patient returns for her post-partum visit. All information below was reviewed with her. Visit Reason:  Post-Partum Visit       Baby's Name:  Davian Whelan       Delivery Date:  23       Type of Delivery:         Feeding: Breast       LMP: Patient's last menstrual period was 2022 (exact date).        Contraceptive Choices:        Last PAP:  22 ASCUS       Depression: Score 2  Problems:

## 2023-05-17 NOTE — PROGRESS NOTES
[facility]  CNM Office Note    Isaias Baird is a 28 y.o. female who presents today for her medical conditions/ complaints as noted below. Chief Complaint   Patient presents with    Postpartum Care         HPI  Vickie Amador presents for 2 week PP . She is doing well without complaints. Breastfeeding without difficulty. Pain gone, minimal bleeding, mood stable. Visit Reason:  Post-Partum Visit       Baby's Name:  Kwadwo Velazquez       Delivery Date:  23       Type of Delivery:         Feeding: Breast       LMP: Patient's last menstrual period was 2022 (exact date). Contraceptive Choices:        Last PAP:  22 ASCUS       Depression: Score 2    Problems/Complaints today:  1. 2 weeks postpartum follow-up  -     FL EVAL SELF-ASSESS DEPRESSION  2. Postpartum care and examination  3. Postpartum care following vaginal delivery       Patient Active Problem List   Diagnosis    40 weeks gestation of pregnancy     (vaginal birth after )       Patient's last menstrual period was 2022 (exact date). [id number]    Past Medical History:   Diagnosis Date    Anemia     Miscarriage      Past Surgical History:   Procedure Laterality Date     SECTION N/A 2017     SECTION performed by Farideh Jean MD at [address] [facility]    DILATION AND CURETTAGE OF UTERUS N/A 2020    DILATATION AND CURETTAGE SUCTION performed by Genna Foster MD at [address]      as an infant    FL REPAIR ENTEROCELE VAGINAL APPROACH SPX N/A 2018    VAGINAL CANAL REPAIR performed by Genna Foster MD at [address] OR     No family history on file.   Social History     Tobacco Use    Smoking status: Never    Smokeless tobacco: Never   Substance Use Topics    Alcohol use: No     Alcohol/week: 0.0 standard drinks       Current Outpatient Medications   Medication Sig Dispense Refill    ferrous sulfate (IRON 325) 325 (65 Fe) MG tablet Take 1 tablet by mouth 2 times daily (with

## 2024-02-06 RX ORDER — ACETAMINOPHEN AND CODEINE PHOSPHATE 120; 12 MG/5ML; MG/5ML
1 SOLUTION ORAL DAILY
Qty: 84 TABLET | Refills: 0 | Status: SHIPPED | OUTPATIENT
Start: 2024-02-06

## 2024-02-19 ENCOUNTER — PATIENT MESSAGE (OUTPATIENT)
Dept: OBGYN CLINIC | Age: 29
End: 2024-02-19

## 2024-02-19 NOTE — TELEPHONE ENCOUNTER
From: Jen Mueller  To: KAIA Armstrong - CNM  Sent: 2/19/2024 8:42 AM CST  Subject: Mastitis and passing out    Hi Ashley, I have been dealing with pain in my left breast and cracked nipples for a few weeks, then last night I started getting a fever and felt like I had a horrible case of the flu… When I got up this morning I took a few steps and passed out, my fever is 101.6 and that painful spot in my breast is extremely sore and sensitive — so I was wondering if I need to come in or would I be able to have my  go  something for it?

## 2024-05-07 RX ORDER — ACETAMINOPHEN AND CODEINE PHOSPHATE 120; 12 MG/5ML; MG/5ML
1 SOLUTION ORAL DAILY
Qty: 84 TABLET | Refills: 0 | Status: SHIPPED | OUTPATIENT
Start: 2024-05-07

## 2024-05-30 ENCOUNTER — TELEPHONE (OUTPATIENT)
Dept: OBGYN CLINIC | Age: 29
End: 2024-05-30

## 2024-05-30 DIAGNOSIS — O20.9 BLEEDING IN EARLY PREGNANCY: Primary | ICD-10-CM

## 2024-05-30 NOTE — TELEPHONE ENCOUNTER
Pt called stating she is having bleeding all day. Requires a pad. Had a gush of bright red blood earlier Wednesday morning. Pt advised she needs lab work, Rhogam, and ultrasound. Pt having hard time getting here and . Orders placed and ultrasound/appt scheduled for tomorrow. Explained the significance of Rhogam. Pt voiced understanding.

## 2024-06-03 ENCOUNTER — HOSPITAL ENCOUNTER (OUTPATIENT)
Dept: ULTRASOUND IMAGING | Age: 29
Discharge: HOME | End: 2024-06-03
Attending: ADVANCED PRACTICE MIDWIFE

## 2024-06-03 ENCOUNTER — OFFICE VISIT (OUTPATIENT)
Dept: OBGYN CLINIC | Age: 29
End: 2024-06-03

## 2024-06-03 VITALS — HEIGHT: 66 IN | WEIGHT: 163 LBS | BODY MASS INDEX: 26.2 KG/M2

## 2024-06-03 DIAGNOSIS — Z34.81 ENCOUNTER FOR SUPERVISION OF OTHER NORMAL PREGNANCY, FIRST TRIMESTER: Primary | ICD-10-CM

## 2024-06-03 DIAGNOSIS — O20.9 BLEEDING IN EARLY PREGNANCY: ICD-10-CM

## 2024-06-03 DIAGNOSIS — Z3A.01 6 WEEKS GESTATION OF PREGNANCY: ICD-10-CM

## 2024-06-03 LAB
ABO/RH: NORMAL
ANTIBODY SCREEN: NORMAL
GONADOTROPIN, CHORIONIC (HCG) QUANT: ABNORMAL MIU/ML (ref 0–5.3)
PROGEST SERPL-MCNC: 20.18 NG/ML
RHIG LOT NUMBER: NORMAL

## 2024-06-03 PROCEDURE — 76817 TRANSVAGINAL US OBSTETRIC: CPT

## 2024-06-03 PROCEDURE — 99213 OFFICE O/P EST LOW 20 MIN: CPT | Performed by: ADVANCED PRACTICE MIDWIFE

## 2024-06-03 PROCEDURE — 96372 THER/PROPH/DIAG INJ SC/IM: CPT | Performed by: ADVANCED PRACTICE MIDWIFE

## 2024-06-03 NOTE — PATIENT INSTRUCTIONS
Learning About When to Call Your Doctor During Pregnancy (Up to 20 Weeks)  Overview     It's common to have concerns about what might be a problem during your pregnancy. Most pregnancies don't have any serious problems. But it's still important to know when to call your doctor if you have certain symptoms.  These are general suggestions. Your doctor may give you some more information about when to call.  When to call your doctor (up to 20 weeks)  Call 911 anytime you think you may need emergency care. For example, call if:  You have severe vaginal bleeding. This means you are soaking through a pad each hour for 2 or more hours.  You have chest pain, are short of breath, or cough up blood.  You have sudden, severe pain in your belly.  You passed out (lost consciousness).  Call your doctor now or seek immediate medical care if:  You have a fever.  You have vaginal bleeding.  You are dizzy or lightheaded, or you feel like you may faint.  You have signs of a blood clot in your leg (called a deep vein thrombosis), such as:  Pain in the calf, back of the knee, thigh, or groin.  Swelling in your leg or groin.  A color change on the leg or groin. The skin may be reddish or purplish, depending on your usual skin color.  You have symptoms of a urinary tract infection. These may include:  Pain or burning when you urinate.  A frequent need to urinate without being able to pass much urine.  Pain in the flank, which is just below the rib cage and above the waist on either side of the back.  Blood in your urine.  You have belly pain.  You think you are having contractions.  Watch closely for changes in your health, and be sure to contact your doctor if:  You have vaginal discharge that smells bad.  You feel sad, anxious, or hopeless for more than a few days.  You have other concerns about your pregnancy.  Follow-up care is a key part of your treatment and safety. Be sure to make and go to all appointments, and call your doctor if

## 2024-06-04 NOTE — PROGRESS NOTES
Pt presents today for pregnancy confirmation/OB new, pt messaged due to bleeding in early pregnancy. Pt went to have labs drawn today as well as her ultrasound prior to appointment. Pt was told she has hematoma and that could be the reason for bleeding.     After obtaining consent, and per orders of Milly Keller, injection of rhogam given in Left upper quad. gluteus by Criss Kay. Patient instructed to remain in clinic for 20 minutes afterwards, and to report any adverse reaction to me immediately.   
PM CDT

## 2024-06-06 ENCOUNTER — PATIENT MESSAGE (OUTPATIENT)
Dept: OBGYN CLINIC | Age: 29
End: 2024-06-06

## 2024-06-06 RX ORDER — ONDANSETRON 4 MG/1
4 TABLET, ORALLY DISINTEGRATING ORAL 3 TIMES DAILY PRN
Qty: 21 TABLET | Refills: 0 | Status: SHIPPED | OUTPATIENT
Start: 2024-06-06

## 2024-06-19 ENCOUNTER — INITIAL PRENATAL (OUTPATIENT)
Age: 29
End: 2024-06-19

## 2024-06-19 VITALS
HEIGHT: 66 IN | WEIGHT: 155 LBS | DIASTOLIC BLOOD PRESSURE: 76 MMHG | BODY MASS INDEX: 24.91 KG/M2 | SYSTOLIC BLOOD PRESSURE: 124 MMHG

## 2024-06-19 DIAGNOSIS — O36.80X0 ENCOUNTER TO DETERMINE FETAL VIABILITY OF PREGNANCY, SINGLE OR UNSPECIFIED FETUS: Primary | ICD-10-CM

## 2024-06-19 DIAGNOSIS — O34.219 HX SUCCESSFUL VBAC (VAGINAL BIRTH AFTER CESAREAN), CURRENTLY PREGNANT: ICD-10-CM

## 2024-06-19 DIAGNOSIS — Z36.3 SCREENING, ANTENATAL, FOR MALFORMATION BY ULTRASOUND: ICD-10-CM

## 2024-06-19 DIAGNOSIS — Z3A.08 8 WEEKS GESTATION OF PREGNANCY: ICD-10-CM

## 2024-06-19 DIAGNOSIS — O46.8X9 SUBCHORIONIC HEMATOMA, ANTEPARTUM, SINGLE OR UNSPECIFIED FETUS: ICD-10-CM

## 2024-06-19 DIAGNOSIS — Z34.81 MULTIGRAVIDA IN FIRST TRIMESTER: ICD-10-CM

## 2024-06-19 DIAGNOSIS — O09.299 HISTORY OF POSTPARTUM HEMORRHAGE, CURRENTLY PREGNANT: ICD-10-CM

## 2024-06-19 DIAGNOSIS — O41.8X90 SUBCHORIONIC HEMATOMA, ANTEPARTUM, SINGLE OR UNSPECIFIED FETUS: ICD-10-CM

## 2024-06-19 DIAGNOSIS — O21.9 NAUSEA AND VOMITING DURING PREGNANCY: ICD-10-CM

## 2024-06-19 DIAGNOSIS — O34.219 PREVIOUS CESAREAN DELIVERY, ANTEPARTUM: ICD-10-CM

## 2024-06-19 PROBLEM — Z98.891 HISTORY OF VBAC: Status: ACTIVE | Noted: 2020-09-09

## 2024-06-19 PROCEDURE — 99204 OFFICE O/P NEW MOD 45 MIN: CPT | Performed by: ADVANCED PRACTICE MIDWIFE

## 2024-06-19 RX ORDER — DOXYLAMINE SUCCINATE AND PYRIDOXINE HYDROCHLORIDE 20; 20 MG/1; MG/1
1 TABLET, EXTENDED RELEASE ORAL EVERY 12 HOURS SCHEDULED
Qty: 60 TABLET | Refills: 1 | Status: SHIPPED | OUTPATIENT
Start: 2024-06-19

## 2024-06-19 RX ORDER — METOCLOPRAMIDE 10 MG/1
10 TABLET ORAL 4 TIMES DAILY PRN
Qty: 120 TABLET | Refills: 1 | Status: SHIPPED | OUTPATIENT
Start: 2024-06-19

## 2024-06-19 RX ORDER — ONDANSETRON 4 MG/1
4 TABLET, ORALLY DISINTEGRATING ORAL
COMMUNITY
Start: 2024-06-06

## 2024-06-19 NOTE — PROGRESS NOTES
"28-year old patient arrived to initiate prenatal care.     HPI: . Patient's last menstrual period was 2024.  This was not a planned pregnancy, as she was on the mini pill. She recently stopped nursing her youngest daughter. She had a lot of bleeding last week that \"poured for a couple of days\" and then spotted on and off. She had \"severe\" cramping with both episodes of bleeding. She desires a bilateral salpingectomy postpartum. She has not had any bleeding the past few days.  Pre-pregnancy weight of 155 lbs.    Previous prenatal history significant for: low transverse  x1, term  x3; SAB x 1 with D&C; severe diastasis in labor and used an abdominal binder for support during labor, postpartum hemorrhage (2018), extensive bilateral sulcal lacerations/bilateral labial laceration/second degree perineal resulting in need for surgical repair later that day (2018)  History significant for: NKA, anemia; hernia repair as a child, rh negative,     The following portion of the patient's history were reviewed and updated as needed: allergies, current medications, past family history, past medical history, social history, surgical history, and problem list.    ROS: All systems reviewed and are negative with exception of the following: nausea, vomiting, fatigue, breast tenderness, lightheadedness, constipation      US ordered today, reviewed and shows IUP of 8w5d gestation and Estimated Date of Delivery: 25. HANNAH measures 6.3 x 5.4 cm adjacent to gestational sac.     Ultrasound 6/3/2024: Single live intrauterine gestation of 6w3d. Subchorionic hemorrhage measures 3.0 x 1.4 x 3.7 cm. EDC 2025.      Pap Smear 22 NILM    Exam:  Wt: 155 lb for TWG of 0 kg (0 lb), B/P 124/76, FHTs 174   General Appearance:  healthy-appearing . Appropriate mood and behavior.  HEENT:  Neck supple, no thyroidmegaly.  Cardiorespiratory: HR str and reg. No murmur. Lungs clear. Resp even and unlabored.  Abd: " Soft, nontender. No CVA tenderness.   Ext: Calves non-tender. No cyanosis or edema.    Diagnoses and all orders for this visit:    1. Encounter to determine fetal viability of pregnancy, single or unspecified fetus (Primary)  Ultrasound today and reviewed: Viable garcía intrauterine gestation. Crown-rump length 20.8 mm for estimated gestational age of 8 weeks 5 days. The ZENAIDA is the same as the early ultrasound by previous provider at 6 weeks 3 days.     2. 8 weeks gestation of pregnancy  Initial prenatal labs and referrals discussed and entered.   -     ToxASSURE Select 13 (MW) - Urine, Clean Catch  -     ABO / Rh  -     CBC & Differential  -     Antibody Screen  -     Hepatitis B Surface Antigen  -     RPR, Rfx Qn RPR / Confirm TP  -     Rubella Antibody, IgG  -     Urine Culture - , Urine, Clean Catch  -     HIV-1 / O / 2 Ag / Antibody  -     Varicella Zoster Antibody, IgG  -     Chlamydia trachomatis, Neisseria gonorrhoeae, Trichomonas vaginalis, PCR - Urine, Urine, Random Void  -     HCV Antibody Rfx To Qnt PCR  -     Hemoglobin A1c  -     TSH Rfx On Abnormal To Free T4  -     Ambulatory Referral to MFM/Perinatology  -     doxylamine-pyridoxine ER (Bonjesta) 20-20 MG tablet controlled-release tablet; Take 1 tablet by mouth Every 12 (Twelve) Hours.  Dispense: 60 tablet; Refill: 1  -     metoclopramide (Reglan) 10 MG tablet; Take 1 tablet by mouth 4 (Four) Times a Day As Needed (nausea and vomiting).  Dispense: 120 tablet; Refill: 1    3. Multigravida in first trimester  Reviewed information in new OB packet, including OTC medications for use during pregnancy, first trimester of pregnancy and discomforts, regular OB routine, ffDNA/chromosomal risk and maternal carrier screening testing.  Advised to maintain regular activity and/or exercise. Discussed bleeding and pelvic pain warnings and other signs to report. Discussed and ordered initial prenatal labs today. First Trimester of Pregnancy video included in  AVS.    4. Nausea and vomiting during pregnancy  Discussed nausea relief measures both nonpharmacologic (small frequent meals or snacks, avoiding triggers, aromatherapy, ginger, hard and/or sour candies or Preggie Pops, ginger, accu-pressure wrist bands, citrus/citric acid) and pharmacologic (both OTC and Rx). Encouraged adequate hydration and intake of calories. Patient to notify provider if symptoms persist or worsen Morning Sickness and Doxylamine; Vitamin B6 Delayed- and Extended-Release Tablets education included in the AVS. Bonjesta and Reglan prescriptions discussed and sent to pharmacy.   -     doxylamine-pyridoxine ER (Bonjesta) 20-20 MG tablet controlled-release tablet; Take 1 tablet by mouth Every 12 (Twelve) Hours.  Dispense: 60 tablet; Refill: 1  -     metoclopramide (Reglan) 10 MG tablet; Take 1 tablet by mouth 4 (Four) Times a Day As Needed (nausea and vomiting).  Dispense: 120 tablet; Refill: 1    5. Subchorionic hematoma, antepartum, single or unspecified fetus  Discussed bleeding warnings to report.     6. Screening, , for malformation by ultrasound  Discussed anatomy scan to be completed through perinatology at approximately 20-weeks gestation. Anatomy scan completed through collaboration with their practice to assess for possible anomalies or markers for aneuploidy.   -     Ambulatory Referral to [facility]/Perinatology    7. Previous  delivery, antepartum  Primary  delivery for breech presentation in active labor.     8. Hx successful  (vaginal birth after ), currently pregnant  Three successful vaginal deliveries after a  section.     9. History of postpartum hemorrhage, currently pregnant  Approximate 500 mL blood loss with delivery 2018. She had vaginal packing removed approximately 8 hours after repair. She was then taken to OR for further repair of extensive bilateral sulcus lacerations, bilateral labial lacerations, and second degree perineal  laceration. 1000 mL noted as the blood loss for the surgical repair.         Return to the office in 4 weeks for routine prenatal visit and as needed with concerns.        This note has been signed electronically.     Jemma Wheeler DNP, APRN, CNM, RNC-OB

## 2024-06-21 LAB
ABO GROUP BLD: NORMAL
BACTERIA UR CULT: NORMAL
BACTERIA UR CULT: NORMAL
BASOPHILS # BLD AUTO: 0 X10E3/UL (ref 0–0.2)
BASOPHILS NFR BLD AUTO: 1 %
BLD GP AB SCN SERPL QL: NORMAL
BLD GP AB SCN SERPL QL: POSITIVE
BLOOD GROUP ANTIBODIES SERPL: ABNORMAL
C TRACH RRNA SPEC QL NAA+PROBE: NEGATIVE
EOSINOPHIL # BLD AUTO: 0 X10E3/UL (ref 0–0.4)
EOSINOPHIL NFR BLD AUTO: 1 %
ERYTHROCYTE [DISTWIDTH] IN BLOOD BY AUTOMATED COUNT: 13.1 % (ref 11.7–15.4)
HBA1C MFR BLD: 5.7 % (ref 4.8–5.6)
HBV SURFACE AG SERPL QL IA: NEGATIVE
HCT VFR BLD AUTO: 37.7 % (ref 34–46.6)
HCV AB SERPL QL IA: NORMAL
HCV IGG SERPL QL IA: NON REACTIVE
HGB BLD-MCNC: 12 G/DL (ref 11.1–15.9)
HIV 1+2 AB+HIV1 P24 AG SERPL QL IA: NON REACTIVE
IMM GRANULOCYTES # BLD AUTO: 0 X10E3/UL (ref 0–0.1)
IMM GRANULOCYTES NFR BLD AUTO: 0 %
LYMPHOCYTES # BLD AUTO: 1.3 X10E3/UL (ref 0.7–3.1)
LYMPHOCYTES NFR BLD AUTO: 24 %
MCH RBC QN AUTO: 27.7 PG (ref 26.6–33)
MCHC RBC AUTO-ENTMCNC: 31.8 G/DL (ref 31.5–35.7)
MCV RBC AUTO: 87 FL (ref 79–97)
MONOCYTES # BLD AUTO: 0.4 X10E3/UL (ref 0.1–0.9)
MONOCYTES NFR BLD AUTO: 8 %
N GONORRHOEA RRNA SPEC QL NAA+PROBE: NEGATIVE
NEUTROPHILS # BLD AUTO: 3.5 X10E3/UL (ref 1.4–7)
NEUTROPHILS NFR BLD AUTO: 66 %
PLATELET # BLD AUTO: 233 X10E3/UL (ref 150–450)
RBC # BLD AUTO: 4.33 X10E6/UL (ref 3.77–5.28)
RH BLD: NEGATIVE
RPR SER QL: NON REACTIVE
RUBV IGG SERPL IA-ACNC: 14.2 INDEX
T VAGINALIS RRNA SPEC QL NAA+PROBE: NEGATIVE
TSH SERPL DL<=0.005 MIU/L-ACNC: 1.88 UIU/ML (ref 0.45–4.5)
VZV IGG SER IA-ACNC: 846 INDEX
WBC # BLD AUTO: 5.2 X10E3/UL (ref 3.4–10.8)
XXX BLOOD GROUP AB TITR SERPL AHG: ABNORMAL {TITER}

## 2024-06-24 PROBLEM — O46.8X9 SUBCHORIONIC HEMATOMA, ANTEPARTUM: Status: ACTIVE | Noted: 2024-06-19

## 2024-06-24 PROBLEM — O09.299 HISTORY OF POSTPARTUM HEMORRHAGE, CURRENTLY PREGNANT: Status: ACTIVE | Noted: 2024-06-24

## 2024-06-24 PROBLEM — O34.219 HX SUCCESSFUL VBAC (VAGINAL BIRTH AFTER CESAREAN), CURRENTLY PREGNANT: Status: ACTIVE | Noted: 2024-06-19

## 2024-06-24 PROBLEM — O09.299 HISTORY OF POSTPARTUM HEMORRHAGE, CURRENTLY PREGNANT: Status: ACTIVE | Noted: 2024-06-19

## 2024-06-24 PROBLEM — O41.8X90 SUBCHORIONIC HEMATOMA, ANTEPARTUM: Status: ACTIVE | Noted: 2024-06-19

## 2024-06-24 PROBLEM — Z34.90 PREGNANCY: Status: ACTIVE | Noted: 2024-06-19

## 2024-06-24 PROBLEM — O34.219 PREVIOUS CESAREAN DELIVERY AFFECTING PREGNANCY, ANTEPARTUM: Status: RESOLVED | Noted: 2018-05-30 | Resolved: 2024-06-24

## 2024-06-24 PROBLEM — O34.219 PREVIOUS CESAREAN DELIVERY, ANTEPARTUM: Status: ACTIVE | Noted: 2024-06-19

## 2024-06-28 LAB — DRUGS UR: NORMAL

## 2024-07-02 DIAGNOSIS — Z3A.01 6 WEEKS GESTATION OF PREGNANCY: ICD-10-CM

## 2024-07-02 DIAGNOSIS — Z34.81 ENCOUNTER FOR SUPERVISION OF OTHER NORMAL PREGNANCY, FIRST TRIMESTER: ICD-10-CM

## 2024-07-02 LAB
ABO + RH BLD: NORMAL
ABO/RH: NORMAL
ANTIBODY SCREEN: NORMAL
BLOOD GROUP ANTIBODIES SERPL: NORMAL
C TRACH DNA UR QL NAA+PROBE: NOT DETECTED
HCV AB SERPL QL IA: NORMAL
N GONORRHOEA DNA UR QL NAA+PROBE: NOT DETECTED
T VAGINALIS DNA UR QL NAA+PROBE: NOT DETECTED

## 2024-07-04 LAB
BACTERIA UR CULT: NORMAL
BASOPHILS # BLD: 0 K/UL (ref 0–0.2)
BASOPHILS NFR BLD: 0.5 % (ref 0–1)
EOSINOPHIL # BLD: 0 K/UL (ref 0–0.6)
EOSINOPHIL NFR BLD: 0.4 % (ref 0–5)
ERYTHROCYTE [DISTWIDTH] IN BLOOD BY AUTOMATED COUNT: 13.2 % (ref 11.5–14.5)
HBV SURFACE AG SERPL QL IA: ABNORMAL
HCT VFR BLD AUTO: 37 % (ref 37–47)
HGB BLD-MCNC: 12 G/DL (ref 12–16)
HIV-1 P24 AG: ABNORMAL
HIV1+2 AB SERPLBLD QL IA.RAPID: ABNORMAL
IMM GRANULOCYTES # BLD: 0 K/UL
LYMPHOCYTES # BLD: 1.6 K/UL (ref 1.1–4.5)
LYMPHOCYTES NFR BLD: 28.7 % (ref 20–40)
MCH RBC QN AUTO: 28.5 PG (ref 27–31)
MCHC RBC AUTO-ENTMCNC: 32.4 G/DL (ref 33–37)
MCV RBC AUTO: 87.9 FL (ref 81–99)
MONOCYTES # BLD: 0.3 K/UL (ref 0–0.9)
MONOCYTES NFR BLD: 5.3 % (ref 0–10)
NEUTROPHILS # BLD: 3.6 K/UL (ref 1.5–7.5)
NEUTS SEG NFR BLD: 64.6 % (ref 50–65)
PLATELET # BLD AUTO: 206 K/UL (ref 130–400)
PMV BLD AUTO: 10.4 FL (ref 9.4–12.3)
RBC # BLD AUTO: 4.21 M/UL (ref 4.2–5.4)
RPR SER QL: ABNORMAL
RUBV IGG SER-ACNC: REACTIVE [IU]/ML
VZV IGG SER IA-ACNC: 760.3 IV
WBC # BLD AUTO: 5.5 K/UL (ref 4.8–10.8)

## 2024-07-08 ENCOUNTER — TELEPHONE (OUTPATIENT)
Dept: OBGYN CLINIC | Age: 29
End: 2024-07-08

## 2024-07-08 NOTE — TELEPHONE ENCOUNTER
Jen called to cancel her appt for today due to kids being sick. Patient will call back later to r/s        Thank you.

## 2024-07-15 NOTE — PROGRESS NOTES
Reason for visit: Routine OB visit at 12w4d. ZENAIDA 2025, by Ultrasound    CC:  Some bleeding last night, soaked half a pad, brown blood. Last week had a bleeding episode with bleeding that filled more than half a pad. Some nausea and fatigue. Denies VB (today), LOF, pelvic pain, or cramping.     ROS: All systems reviewed and are negative with exception of the following: Fatigue, nausea    Wt 161 lb for a TWG of 2.722 kg (6 lb), /70, FHTs 160  Urine today and reviewed: neg glucose, trace protein    US today: , measuring 12w4d  HANNAH measuring 5.4 x 3.1, measures smaller than last month    Anatomy Scan: scheduled for 24 at 12:45    Exam:  General Appearance:  Healthy appearing . Normal mood and behavior.  HEENT: NCAT, EOMI  HR str and reg. Lungs clear. Resp even and unlabored.  Abdomen is soft and nontender. No CVA tenderness. Uterus is consistent with EGA  Ext: No edema, nontender, no trauma, or cyanosis.    Impression  Diagnoses and all orders for this visit:    1. 12 weeks gestation of pregnancy (Primary)  Discussed second trimester of pregnancy, including discomforts and measures of comfort, pelvic pain/cramping warnings, bleeding warnings, and signs and symptoms to report. Discussed genetic screening during pregnancy, including review of information from the screening pamphlet. She consents to screening today. Discussed and encouraged to come to the hospital or call for vaginal bleeding, suspected leaking of fluid, pelvic pain/cramping, or any other concerns. Second trimester of pregnancy video and [facility] education included in the AVS.   -POC Urinalysis Dipstick  -Oziel Panorama Prenatal Test: Chromosomes 13, 18, 21, X & Y: Triploidy 22Q.11.2 Deletion - Blood,    2. Multigravida in first trimester    3. Subchorionic hematoma in first trimester, single or unspecified fetus  Come back in for heavy bleeding or cramping. Will continue w/ultrasounds until HANNAH resolves.     4.  Previous  delivery, antepartum    5. Hx successful  (vaginal birth after ), currently pregnant    6. History of postpartum hemorrhage, currently pregnant        Follow up in 4 weeks for follow up visit with US.    This note has been signed electronically.  CORA Infante, MILDREDM

## 2024-07-16 ENCOUNTER — ROUTINE PRENATAL (OUTPATIENT)
Age: 29
End: 2024-07-16

## 2024-07-16 VITALS — SYSTOLIC BLOOD PRESSURE: 112 MMHG | BODY MASS INDEX: 25.99 KG/M2 | DIASTOLIC BLOOD PRESSURE: 70 MMHG | WEIGHT: 161 LBS

## 2024-07-16 DIAGNOSIS — O41.8X10 SUBCHORIONIC HEMATOMA IN FIRST TRIMESTER, SINGLE OR UNSPECIFIED FETUS: ICD-10-CM

## 2024-07-16 DIAGNOSIS — O34.219 PREVIOUS CESAREAN DELIVERY, ANTEPARTUM: ICD-10-CM

## 2024-07-16 DIAGNOSIS — Z3A.12 12 WEEKS GESTATION OF PREGNANCY: Primary | ICD-10-CM

## 2024-07-16 DIAGNOSIS — O34.219 HX SUCCESSFUL VBAC (VAGINAL BIRTH AFTER CESAREAN), CURRENTLY PREGNANT: ICD-10-CM

## 2024-07-16 DIAGNOSIS — Z34.81 MULTIGRAVIDA IN FIRST TRIMESTER: ICD-10-CM

## 2024-07-16 DIAGNOSIS — O46.8X1 SUBCHORIONIC HEMATOMA IN FIRST TRIMESTER, SINGLE OR UNSPECIFIED FETUS: ICD-10-CM

## 2024-07-16 DIAGNOSIS — O09.299 HISTORY OF POSTPARTUM HEMORRHAGE, CURRENTLY PREGNANT: ICD-10-CM

## 2024-07-16 LAB
GLUCOSE UR STRIP-MCNC: NEGATIVE MG/DL
PROT UR STRIP-MCNC: ABNORMAL MG/DL

## 2024-07-25 LAB
Lab: NORMAL
NTRA FETAL FRACTION: NORMAL
NTRA GENDER OF FETUS: NORMAL
NTRA MONOSOMY X AGE-BASED RISK TEXT: NORMAL
NTRA MONOSOMY X RESULT TEXT: NORMAL
NTRA MONOSOMY X RISK SCORE TEXT: NORMAL
NTRA TRIPLOIDY RESULT TEXT: NORMAL
NTRA TRISOMY 13 AGE-BASED RISK TEXT: NORMAL
NTRA TRISOMY 13 RESULT TEXT: NORMAL
NTRA TRISOMY 13 RISK SCORE TEXT: NORMAL
NTRA TRISOMY 18 AGE-BASED RISK TEXT: NORMAL
NTRA TRISOMY 18 RESULT TEXT: NORMAL
NTRA TRISOMY 18 RISK SCORE TEXT: NORMAL
NTRA TRISOMY 21 AGE-BASED RISK TEXT: NORMAL
NTRA TRISOMY 21 RESULT TEXT: NORMAL
NTRA TRISOMY 21 RISK SCORE TEXT: NORMAL

## 2024-09-25 ENCOUNTER — TELEPHONE (OUTPATIENT)
Dept: OBGYN CLINIC | Age: 29
End: 2024-09-25

## 2024-09-30 ENCOUNTER — INITIAL PRENATAL (OUTPATIENT)
Dept: OBGYN CLINIC | Age: 29
End: 2024-09-30

## 2024-09-30 VITALS
HEART RATE: 85 BPM | SYSTOLIC BLOOD PRESSURE: 112 MMHG | DIASTOLIC BLOOD PRESSURE: 72 MMHG | BODY MASS INDEX: 30.18 KG/M2 | WEIGHT: 187 LBS

## 2024-09-30 DIAGNOSIS — Z3A.23 23 WEEKS GESTATION OF PREGNANCY: Primary | ICD-10-CM

## 2024-09-30 DIAGNOSIS — Z67.91 RH NEGATIVE STATE IN ANTEPARTUM PERIOD: ICD-10-CM

## 2024-09-30 DIAGNOSIS — O26.899 RH NEGATIVE STATE IN ANTEPARTUM PERIOD: ICD-10-CM

## 2024-09-30 DIAGNOSIS — Z34.82 ENCOUNTER FOR SUPERVISION OF OTHER NORMAL PREGNANCY, SECOND TRIMESTER: ICD-10-CM

## 2024-09-30 PROBLEM — Z3A.40 40 WEEKS GESTATION OF PREGNANCY: Status: RESOLVED | Noted: 2023-05-03 | Resolved: 2024-09-30

## 2024-09-30 PROCEDURE — 99213 OFFICE O/P EST LOW 20 MIN: CPT | Performed by: ADVANCED PRACTICE MIDWIFE

## 2024-09-30 NOTE — PROGRESS NOTES
[facility] Prenatal Office Note  Subjective:  Jen Mueller is here for a return obstetrical visit. Today she is 23w4d weeks EGA.   She is taking her prenatal vitamins and is aware of nutrition needs. She reports the following:    Problems/complaints today:  Healthy, no complaints  Objective:  Mother's Prenatal Vitals  BP: 112/72  Weight - Scale: 84.8 kg (187 lb)  Pulse: 85  Patient Position: Sitting  Pt is A&Ox3, in no acute distress. Normocephalic, atraumatic. PERRL. Resp even and non-labored. Skin pink, warm & dry. Gravid abdomen. ERAZO's well. Gait steady.   Assessment:    IUP at 23w4d wks      Diagnosis Orders   1. 23 weeks gestation of pregnancy        2. Encounter for supervision of other normal pregnancy, second trimester          Plan:  Problems/complaints Management Plan:  Continue current PNV dose.   Routine OB Management Plan:  Pt counseled on balanced nutrition, adequate fluid intake, taking PNV daily, and exercise along with  schedule anatomy  Continue with routine prenatal care.  RTC in 4 wks for prenatal visit.      MEDICATIONS:  No orders of the defined types were placed in this encounter.    ORDERS:  No orders of the defined types were placed in this encounter.        I Polly Saldivar LPN, am scribing for and in the presence of Milly Keller CNM  9/30/24  11:31 AM CDT   I have seen and examined the patient independently.  I reviewed all laboratory and imaging studies that are relevant.  I have reviewed and made any appropriate changes to the HPI.    Electronically signed by KAIA Armstrong CNM on 9/30/24  at 6:50 PM CDT .

## 2024-09-30 NOTE — PATIENT INSTRUCTIONS
after drinking the sweet liquid. If this happens, you may need to take the test at a later time.  Your doctor may do more glucose tests at other times during your pregnancy.  Follow-up care is a key part of your treatment and safety. Be sure to make and go to all appointments, and call your doctor if you are having problems. It's also a good idea to know your test results and keep a list of the medicines you take.  Where can you learn more?  Go to https://www.[employer].net/patientEd and enter A472 to learn more about \"Learning About Screening for Gestational Diabetes.\"  Current as of: October 2, 2023               Content Version: 14.0  © 6386-7935 [employer].   Care instructions adapted under license by [employer]. If you have questions about a medical condition or this instruction, always ask your healthcare professional. [employer] disclaims any warranty or liability for your use of this information.             
18

## 2024-10-28 ENCOUNTER — TELEPHONE (OUTPATIENT)
Dept: OBGYN CLINIC | Age: 29
End: 2024-10-28

## 2024-10-31 ENCOUNTER — ROUTINE PRENATAL (OUTPATIENT)
Dept: OBGYN CLINIC | Age: 29
End: 2024-10-31

## 2024-10-31 VITALS
HEART RATE: 89 BPM | DIASTOLIC BLOOD PRESSURE: 75 MMHG | WEIGHT: 189 LBS | BODY MASS INDEX: 30.51 KG/M2 | SYSTOLIC BLOOD PRESSURE: 117 MMHG

## 2024-10-31 DIAGNOSIS — Z67.91 RH NEGATIVE STATE IN ANTEPARTUM PERIOD: Primary | ICD-10-CM

## 2024-10-31 DIAGNOSIS — O26.899 RH NEGATIVE STATE IN ANTEPARTUM PERIOD: ICD-10-CM

## 2024-10-31 DIAGNOSIS — Z34.82 ENCOUNTER FOR SUPERVISION OF OTHER NORMAL PREGNANCY, SECOND TRIMESTER: ICD-10-CM

## 2024-10-31 DIAGNOSIS — Z3A.23 23 WEEKS GESTATION OF PREGNANCY: ICD-10-CM

## 2024-10-31 DIAGNOSIS — O26.899 RH NEGATIVE STATE IN ANTEPARTUM PERIOD: Primary | ICD-10-CM

## 2024-10-31 DIAGNOSIS — Z67.91 RH NEGATIVE STATE IN ANTEPARTUM PERIOD: ICD-10-CM

## 2024-10-31 LAB
ABO/RH: NORMAL
ANTIBODY SCREEN: NORMAL
BASOPHILS # BLD: 0 K/UL (ref 0–0.2)
BASOPHILS NFR BLD: 0.3 % (ref 0–1)
EOSINOPHIL # BLD: 0.1 K/UL (ref 0–0.6)
EOSINOPHIL NFR BLD: 1.1 % (ref 0–5)
ERYTHROCYTE [DISTWIDTH] IN BLOOD BY AUTOMATED COUNT: 13.2 % (ref 11.5–14.5)
GLUCOSE 1H P MEAL SERPL-MCNC: 108 MG/DL (ref 75–140)
HCT VFR BLD AUTO: 30.3 % (ref 37–47)
HGB BLD-MCNC: 9.6 G/DL (ref 12–16)
IMM GRANULOCYTES # BLD: 0.2 K/UL
LYMPHOCYTES # BLD: 1.9 K/UL (ref 1.1–4.5)
LYMPHOCYTES NFR BLD: 18.8 % (ref 20–40)
MCH RBC QN AUTO: 27.7 PG (ref 27–31)
MCHC RBC AUTO-ENTMCNC: 31.7 G/DL (ref 33–37)
MCV RBC AUTO: 87.3 FL (ref 81–99)
MONOCYTES # BLD: 0.5 K/UL (ref 0–0.9)
MONOCYTES NFR BLD: 4.7 % (ref 0–10)
NEUTROPHILS # BLD: 7.4 K/UL (ref 1.5–7.5)
NEUTS SEG NFR BLD: 73.6 % (ref 50–65)
PLATELET # BLD AUTO: 195 K/UL (ref 130–400)
PMV BLD AUTO: 10.3 FL (ref 9.4–12.3)
RBC # BLD AUTO: 3.47 M/UL (ref 4.2–5.4)
RHIG LOT NUMBER: NORMAL
WBC # BLD AUTO: 10 K/UL (ref 4.8–10.8)

## 2024-10-31 PROCEDURE — 96372 THER/PROPH/DIAG INJ SC/IM: CPT | Performed by: ADVANCED PRACTICE MIDWIFE

## 2024-10-31 PROCEDURE — 99213 OFFICE O/P EST LOW 20 MIN: CPT | Performed by: ADVANCED PRACTICE MIDWIFE

## 2024-10-31 SDOH — ECONOMIC STABILITY: FOOD INSECURITY: WITHIN THE PAST 12 MONTHS, YOU WORRIED THAT YOUR FOOD WOULD RUN OUT BEFORE YOU GOT MONEY TO BUY MORE.: PATIENT DECLINED

## 2024-10-31 SDOH — ECONOMIC STABILITY: INCOME INSECURITY: HOW HARD IS IT FOR YOU TO PAY FOR THE VERY BASICS LIKE FOOD, HOUSING, MEDICAL CARE, AND HEATING?: PATIENT DECLINED

## 2024-10-31 SDOH — ECONOMIC STABILITY: TRANSPORTATION INSECURITY
IN THE PAST 12 MONTHS, HAS LACK OF TRANSPORTATION KEPT YOU FROM MEETINGS, WORK, OR FROM GETTING THINGS NEEDED FOR DAILY LIVING?: PATIENT DECLINED

## 2024-10-31 SDOH — ECONOMIC STABILITY: FOOD INSECURITY: WITHIN THE PAST 12 MONTHS, THE FOOD YOU BOUGHT JUST DIDN'T LAST AND YOU DIDN'T HAVE MONEY TO GET MORE.: PATIENT DECLINED

## 2024-10-31 NOTE — PROGRESS NOTES
CNM Prenatal Office Note  Subjective:  Jen Mueller is here for a return obstetrical visit. Today she is 28w0d weeks EGA.  Pt does feel fetal movement regularly. Denies headaches, RUQ pain, or visual changes as well as contractions, vaginal bleeding or leaking of fluid. 1 hour GCT today. Rhogam indicated.   Problems/Complaints today:  Varicose veins  Objective:  Mother's Prenatal Vitals  BP: 117/75  Weight - Scale: 85.7 kg (189 lb)  Pulse: 89  Patient Position: Sitting  Prenatal Fetal Information  Fundal Height (cm): 28 cm  Fetal HR: US  Movement: Present  Pt is A&Ox3, in no acute distress. Normocephalic, atraumatic. PERRL. Resp even and non-labored. Skin pink, warm & dry. Gravid abdomen. ERAZO's well. Gait steady. EPDS Screenin/30  Assessment:    IUP at 28w0d wks      Diagnosis Orders   1. Rh negative state in antepartum period  Rho(D) immune globulin (HYPERRHO;RHOGAM) injection 300 mcg        Plan:  Problems/Complaints Management Plan:  none  Routine OB Management Plan:  Third trimester teaching completed including warning signs for pre-eclampsia (blurred vision, seeing spots/sparkles, sudden increased weight gain or profound edema, epigastric pain), FKC (decreased fetal movments),  labor ( contractions or watery discharge, vaginal bleeding, cramping), n/v, chills, and or fever. Instructed pt to come to office or go to LDR if these symptoms presented. Pt voiced understanding.   Pt counseled on balanced nutrition, adequate fluid intake, taking PNV daily, and exercise along with GHTN precautions, Kick count, and  labor  Continue with routine prenatal care.  RTC in 2 wks for prenatal visit      MEDICATIONS:  Orders Placed This Encounter   Medications    Rho(D) immune globulin (HYPERRHO;RHOGAM) injection 300 mcg     ORDERS:  No orders of the defined types were placed in this encounter.

## 2024-11-02 LAB — RPR SER QL: NON REACTIVE

## 2024-11-12 ENCOUNTER — TELEPHONE (OUTPATIENT)
Dept: OBGYN CLINIC | Age: 29
End: 2024-11-12

## 2024-11-12 NOTE — TELEPHONE ENCOUNTER
Jen called to reschedule an appointment for  2 wk pre  . Pt will be available until  Thursday of next week. [facility] was unable to accommodate in the time frame needed. Please be advised that the best time to call Anytime.    Thank you.

## 2024-11-19 ENCOUNTER — ROUTINE PRENATAL (OUTPATIENT)
Dept: OBGYN CLINIC | Age: 29
End: 2024-11-19

## 2024-11-19 VITALS
SYSTOLIC BLOOD PRESSURE: 104 MMHG | BODY MASS INDEX: 32.28 KG/M2 | HEART RATE: 81 BPM | WEIGHT: 200 LBS | DIASTOLIC BLOOD PRESSURE: 69 MMHG

## 2024-11-19 DIAGNOSIS — Z34.83 ENCOUNTER FOR SUPERVISION OF OTHER NORMAL PREGNANCY, THIRD TRIMESTER: ICD-10-CM

## 2024-11-19 DIAGNOSIS — O26.899 RH NEGATIVE STATE IN ANTEPARTUM PERIOD: ICD-10-CM

## 2024-11-19 DIAGNOSIS — Z3A.30 30 WEEKS GESTATION OF PREGNANCY: Primary | ICD-10-CM

## 2024-11-19 DIAGNOSIS — Z67.91 RH NEGATIVE STATE IN ANTEPARTUM PERIOD: ICD-10-CM

## 2024-11-19 DIAGNOSIS — I83.812 VARICOSE VEINS OF LEFT LOWER EXTREMITY WITH PAIN: ICD-10-CM

## 2024-11-19 PROCEDURE — 99213 OFFICE O/P EST LOW 20 MIN: CPT | Performed by: ADVANCED PRACTICE MIDWIFE

## 2024-11-19 NOTE — PROGRESS NOTES
[facility] Prenatal Office Note  Subjective:  Jen Mueller is here for a return obstetrical visit. Today she is 30w5d weeks EGA.  Pt does feel fetal movement regularly. Denies headaches, RUQ pain, or visual changes as well as contractions, vaginal bleeding or leaking of fluid.     Problems/complaints today:  S>D  Anemia  Objective:  Mother's Prenatal Vitals  BP: 104/69  Weight - Scale: 90.7 kg (200 lb)  Pulse: 81  Patient Position: Sitting  Prenatal Fetal Information  Movement: Present  Pt is A&Ox3, in no acute distress. Normocephalic, atraumatic. PERRL. Resp even and non-labored. Skin pink, warm & dry. Gravid abdomen. ERAZO's well. Gait steady.   Assessment:    IUP at 30w5d wks      Diagnosis Orders   1. 30 weeks gestation of pregnancy        2. Rh negative state in antepartum period        3. Encounter for supervision of other normal pregnancy, third trimester          Plan:  Problems/Complaints Management Plan:  S>D- Get growth ultrasound  Anemia- Discussed diet and supplements.   Continue current PNV dose.   Routine OB Management Plan:  Pt counseled on balanced nutrition, adequate fluid intake, taking PNV daily, and exercise along with GHTN precautions, Kick count, and  labor  Continue with routine prenatal care.   surveillance not indicated  RTC in 2 wks for prenatal visit    MEDICATIONS:  No orders of the defined types were placed in this encounter.    ORDERS:  No orders of the defined types were placed in this encounter.        I Polly Saldivar LPN, am scribing for and in the presence of Milly Keller CNM  24  10:45 AM CST     I have seen and examined the patient independently.  I reviewed all laboratory and imaging studies that are relevant.  I have reviewed and made any appropriate changes to the HPI.    Electronically signed by KAIA Armstrong CNM on 24  at 12:45 PM CST

## 2024-11-21 DIAGNOSIS — Z3A.30 30 WEEKS GESTATION OF PREGNANCY: ICD-10-CM

## 2024-11-21 DIAGNOSIS — Z3A.30 30 WEEKS GESTATION OF PREGNANCY: Primary | ICD-10-CM

## 2024-12-03 ENCOUNTER — TELEPHONE (OUTPATIENT)
Dept: OBGYN CLINIC | Age: 29
End: 2024-12-03

## 2024-12-03 NOTE — TELEPHONE ENCOUNTER
Jen called to reschedule an appointment for  prenatal visit that she has this morning at 10:30a. Jen stated that she does not have a  . [facility] was unable to accommodate in the time frame needed. Please be advised that the best time to call Anytime.    Thank you.

## 2024-12-12 ENCOUNTER — ROUTINE PRENATAL (OUTPATIENT)
Dept: OBGYN CLINIC | Age: 29
End: 2024-12-12

## 2024-12-12 VITALS
DIASTOLIC BLOOD PRESSURE: 72 MMHG | HEART RATE: 86 BPM | BODY MASS INDEX: 32.6 KG/M2 | WEIGHT: 202 LBS | SYSTOLIC BLOOD PRESSURE: 113 MMHG

## 2024-12-12 DIAGNOSIS — Z34.83 ENCOUNTER FOR SUPERVISION OF OTHER NORMAL PREGNANCY, THIRD TRIMESTER: ICD-10-CM

## 2024-12-12 DIAGNOSIS — I83.812 VARICOSE VEINS OF LEFT LOWER EXTREMITY WITH PAIN: ICD-10-CM

## 2024-12-12 DIAGNOSIS — Z3A.34 34 WEEKS GESTATION OF PREGNANCY: Primary | ICD-10-CM

## 2024-12-12 PROCEDURE — 99213 OFFICE O/P EST LOW 20 MIN: CPT | Performed by: ADVANCED PRACTICE MIDWIFE

## 2024-12-12 NOTE — PROGRESS NOTES
CNM Prenatal Office Note  Subjective:  Jen Mueller is here for a return obstetrical visit. Today she is 34w0d weeks EGA.  Pt does feel fetal movement regularly. Denies headaches, RUQ pain, or visual changes as well as contractions, vaginal bleeding or leaking of fluid.     Problems/complaints today:  Varicosities - support hose  Objective:  Mother's Prenatal Vitals  BP: 113/72  Weight - Scale: 91.6 kg (202 lb)  Pulse: 86  Patient Position: Sitting  Prenatal Fetal Information  Fundal Height (cm): 35 cm  Fetal HR: 142  Movement: Present  Pt is A&Ox3, in no acute distress. Normocephalic, atraumatic. PERRL. Resp even and non-labored. Skin pink, warm & dry. Gravid abdomen. ERAZO's well. Gait steady.   Assessment:    IUP at 34w0d wks      Diagnosis Orders   1. 34 weeks gestation of pregnancy        2. Encounter for supervision of other normal pregnancy, third trimester        3. Varicose veins of left lower extremity with pain          Plan:  Problems/Complaints Management Plan:  Varicosities- Support hose  Continue current PNV dose.  Routine OB Management Plan:  Pt counseled on balanced nutrition, adequate fluid intake, taking PNV daily, and exercise along with GHTN precautions, Kick count, and  labor  Continue with routine prenatal care.   surveillance not indicated  RTC in 1 wks for prenatal visit    MEDICATIONS:  No orders of the defined types were placed in this encounter.    ORDERS:  No orders of the defined types were placed in this encounter.

## 2025-01-06 SDOH — ECONOMIC STABILITY: FOOD INSECURITY: WITHIN THE PAST 12 MONTHS, THE FOOD YOU BOUGHT JUST DIDN'T LAST AND YOU DIDN'T HAVE MONEY TO GET MORE.: NEVER TRUE

## 2025-01-06 SDOH — ECONOMIC STABILITY: FOOD INSECURITY: WITHIN THE PAST 12 MONTHS, YOU WORRIED THAT YOUR FOOD WOULD RUN OUT BEFORE YOU GOT MONEY TO BUY MORE.: NEVER TRUE

## 2025-01-06 SDOH — ECONOMIC STABILITY: INCOME INSECURITY: HOW HARD IS IT FOR YOU TO PAY FOR THE VERY BASICS LIKE FOOD, HOUSING, MEDICAL CARE, AND HEATING?: NOT HARD AT ALL

## 2025-01-06 NOTE — PATIENT INSTRUCTIONS
Patient Education        Week 37 of Your Pregnancy: Care Instructions    Most babies are born between 37 and 40 weeks.   This is a good time to pack a bag to take with you to the birth. Then it will be ready to go when you are.     Learn about breastfeeding.  For example, find out about ways to hold your baby to make breastfeeding easier. And think about learning how to pump and store milk.     Know that crying is normal.  It's common for babies to cry 1 to 3 hours a day. Some cry more, and some cry less.     Learn why babies cry.  They may be hungry; have gas; need a diaper change; or feel cold, warm, tired, lonely, or tense. Sometimes they cry for unknown reasons.     Think about what will help you stay calm when your baby cries.  Taking slow, deep breaths can help. So can taking a break. It's okay to put your baby somewhere safe (like their crib) and walk away for a few minutes.     Learn about safe sleep for your baby.  Always put your baby to sleep on their back. Place them alone in a crib or bassinet with a firm, flat surface. Keep soft items like stuffed animals out of the crib.     Learn what to expect with  poop.  Your baby will have their own bowel patterns. Some babies have several bowel movements a day. Some have fewer.     Know that  babies will often have loose, yellow bowel movements.  Formula-fed babies have more formed stools. If your baby's poop looks like pellets, your baby is constipated.   Follow-up care is a key part of your treatment and safety. Be sure to make and go to all appointments, and call your doctor if you are having problems. It's also a good idea to know your test results and keep a list of the medicines you take.  Where can you learn more?  Go to https://www.[employer].net/patientEd and enter N257 to learn more about \"Week 37 of Your Pregnancy: Care Instructions.\"  Current as of: July 10, 2023  Content Version: 14.2  ©  [employer].   Care

## 2025-01-07 ENCOUNTER — ROUTINE PRENATAL (OUTPATIENT)
Dept: OBGYN CLINIC | Age: 30
End: 2025-01-07

## 2025-01-07 VITALS
DIASTOLIC BLOOD PRESSURE: 77 MMHG | WEIGHT: 208 LBS | HEART RATE: 77 BPM | SYSTOLIC BLOOD PRESSURE: 110 MMHG | BODY MASS INDEX: 33.57 KG/M2

## 2025-01-07 DIAGNOSIS — Z34.83 ENCOUNTER FOR SUPERVISION OF OTHER NORMAL PREGNANCY, THIRD TRIMESTER: Primary | ICD-10-CM

## 2025-01-07 DIAGNOSIS — Z36.85 ENCOUNTER FOR ANTENATAL SCREENING FOR STREPTOCOCCUS B: ICD-10-CM

## 2025-01-07 DIAGNOSIS — I83.812 VARICOSE VEINS OF LEFT LOWER EXTREMITY WITH PAIN: ICD-10-CM

## 2025-01-07 DIAGNOSIS — Z11.3 SCREEN FOR STD (SEXUALLY TRANSMITTED DISEASE): ICD-10-CM

## 2025-01-07 LAB
C TRACH DNA CVX QL NAA+PROBE: NOT DETECTED
N GONORRHOEA DNA CERV MUCUS QL NAA+PROBE: NOT DETECTED
T VAGINALIS DNA GENITAL QL NAA+PROBE: NOT DETECTED

## 2025-01-07 PROCEDURE — 99213 OFFICE O/P EST LOW 20 MIN: CPT | Performed by: ADVANCED PRACTICE MIDWIFE

## 2025-01-07 NOTE — PROGRESS NOTES
CNM Prenatal Office Note  Subjective:  Jen Mueller is here for a return obstetrical visit. Today she is 37w5d weeks EGA.  Pt does feel fetal movement regularly. Denies headaches, RUQ pain, or visual changes as well as contractions, vaginal bleeding or leaking of fluid.     Problems/complaints today:  Healthy, no complaints  Objective:  Mother's Prenatal Vitals  BP: 110/77  Weight - Scale: 94.3 kg (208 lb)  Pulse: 77  Patient Position: Sitting  Prenatal Fetal Information  Fundal Height (cm): 38 cm  Fetal HR: 143  Movement: Present  Cervical Exam  Dilation (cm): 4  Effacement: 80  Station: -1  Station (Labor Curve Graph): 6  Presentation: Cephalic  Dil/Eff/Sta  Dilation (cm): 4  Effacement: 80  Station: -1  Pt is A&Ox3, in no acute distress. Normocephalic, atraumatic. PERRL. Resp even and non-labored. Skin pink, warm & dry. Gravid abdomen. ERAZO's well. Gait steady.   Assessment:    IUP at 37w5d wks      Diagnosis Orders   1. Encounter for supervision of other normal pregnancy, third trimester        2. Varicose veins of left lower extremity with pain        3. Encounter for  screening for Streptococcus B  Strep B DNA probe, amplification      4. Screen for STD (sexually transmitted disease)  Chlamydia, Gonorrhea, Trichomoniasis Swab        Plan:   Problems/Complatins Management Plan:  Continue current PNV dose  Routine OB Management Plan:  GBS collected and labor education completed. Encourage perineal massage. Discussed pain management options during labor and birth plan. Pt counseled on counseled on balanced nutrition, adequate fluid intake, taking PNV daily, and exercise along with labor precautions, GHTN precautions, and Kick count  Continue with routine prenatal care.   surveillance not indicated  RTC in 1 wks for prenatal visit    MEDICATIONS:  No orders of the defined types were placed in this encounter.    ORDERS:  Orders Placed This Encounter   Procedures    Chlamydia, Gonorrhea,

## 2025-01-08 LAB — GP B STREP DNA SPEC QL NAA+PROBE: NOT DETECTED

## 2025-01-13 SDOH — ECONOMIC STABILITY: INCOME INSECURITY: IN THE LAST 12 MONTHS, WAS THERE A TIME WHEN YOU WERE NOT ABLE TO PAY THE MORTGAGE OR RENT ON TIME?: NO

## 2025-01-13 SDOH — ECONOMIC STABILITY: FOOD INSECURITY: WITHIN THE PAST 12 MONTHS, YOU WORRIED THAT YOUR FOOD WOULD RUN OUT BEFORE YOU GOT MONEY TO BUY MORE.: NEVER TRUE

## 2025-01-13 SDOH — ECONOMIC STABILITY: FOOD INSECURITY: WITHIN THE PAST 12 MONTHS, THE FOOD YOU BOUGHT JUST DIDN'T LAST AND YOU DIDN'T HAVE MONEY TO GET MORE.: NEVER TRUE

## 2025-01-13 SDOH — ECONOMIC STABILITY: TRANSPORTATION INSECURITY
IN THE PAST 12 MONTHS, HAS THE LACK OF TRANSPORTATION KEPT YOU FROM MEDICAL APPOINTMENTS OR FROM GETTING MEDICATIONS?: NO

## 2025-01-14 ENCOUNTER — ROUTINE PRENATAL (OUTPATIENT)
Dept: OBGYN CLINIC | Age: 30
End: 2025-01-14

## 2025-01-14 VITALS
DIASTOLIC BLOOD PRESSURE: 77 MMHG | BODY MASS INDEX: 34.38 KG/M2 | WEIGHT: 213 LBS | HEART RATE: 82 BPM | SYSTOLIC BLOOD PRESSURE: 115 MMHG

## 2025-01-14 DIAGNOSIS — Z3A.38 38 WEEKS GESTATION OF PREGNANCY: ICD-10-CM

## 2025-01-14 DIAGNOSIS — Z34.83 ENCOUNTER FOR SUPERVISION OF OTHER NORMAL PREGNANCY, THIRD TRIMESTER: Primary | ICD-10-CM

## 2025-01-14 DIAGNOSIS — I83.812 VARICOSE VEINS OF LEFT LOWER EXTREMITY WITH PAIN: ICD-10-CM

## 2025-01-14 PROCEDURE — 99213 OFFICE O/P EST LOW 20 MIN: CPT | Performed by: ADVANCED PRACTICE MIDWIFE

## 2025-01-14 NOTE — PROGRESS NOTES
Pt presents today for routine prenatal visit. Pt denies vaginal bleeding, cramping, or leaking of fluid +fetal movement.   
signed by KAIA Armstrong CNM on 1/14/25  at 1:05 PM CST

## 2025-01-16 NOTE — PATIENT INSTRUCTIONS
[employer] disclaims any warranty or liability for your use of this information.        Signs of Labor (01:39)  Your health professional recommends that you watch this short online health video.  Learn the signs of labor so you'll know what to do when you're ready to have your baby.  Purpose:  Outlines labor signs, including timing of contractions, Motley Carnes, and having a plan to call the doctor.  Goal:  The user will understand the signs of labor and learn what to do when labor starts.     How to watch the video    Scan the QR code   OR Visit the website    https://hwi.[email or web address]   Current as of: July 10, 2023               Content Version: 14.0   [employer].   Care instructions adapted under license by [employer]. If you have questions about a medical condition or this instruction, always ask your healthcare professional. [employer] disclaims any warranty or liability for your use of this information.       Pain Relief During Labor: Care Instructions  Overview     You can choose from a few types of pain relief for childbirth. These types include:  Medical. Your doctor or midwife may offer different types of pain medicine while you are in labor.  Nonmedical. This can include things like breathing techniques and massage.  You also can use more than one of these choices.  Think about what you want during labor. Your personal needs are important when you make this choice. The right choice is the one that feels right to you. Every labor is different. You may go into labor planning to use nonmedical options only and later find that you need pain medicine. For example, you will be given medicines if you need a  (). Plan for what you want. But be aware that things can change during labor.  Follow-up care is a key part of your treatment and safety. Be sure to make and go to all appointments, and call your doctor if you are having problems. It's also a

## 2025-01-21 ENCOUNTER — ROUTINE PRENATAL (OUTPATIENT)
Dept: OBGYN CLINIC | Age: 30
End: 2025-01-21

## 2025-01-21 VITALS
BODY MASS INDEX: 34.22 KG/M2 | WEIGHT: 212 LBS | HEART RATE: 79 BPM | DIASTOLIC BLOOD PRESSURE: 75 MMHG | SYSTOLIC BLOOD PRESSURE: 117 MMHG

## 2025-01-21 DIAGNOSIS — Z3A.39 39 WEEKS GESTATION OF PREGNANCY: Primary | ICD-10-CM

## 2025-01-21 DIAGNOSIS — Z34.83 ENCOUNTER FOR SUPERVISION OF OTHER NORMAL PREGNANCY, THIRD TRIMESTER: ICD-10-CM

## 2025-01-21 DIAGNOSIS — I83.812 VARICOSE VEINS OF LEFT LOWER EXTREMITY WITH PAIN: ICD-10-CM

## 2025-01-21 PROCEDURE — 99213 OFFICE O/P EST LOW 20 MIN: CPT | Performed by: ADVANCED PRACTICE MIDWIFE

## 2025-01-21 SDOH — ECONOMIC STABILITY: INCOME INSECURITY: IN THE LAST 12 MONTHS, WAS THERE A TIME WHEN YOU WERE NOT ABLE TO PAY THE MORTGAGE OR RENT ON TIME?: NO

## 2025-01-21 SDOH — ECONOMIC STABILITY: FOOD INSECURITY: WITHIN THE PAST 12 MONTHS, THE FOOD YOU BOUGHT JUST DIDN'T LAST AND YOU DIDN'T HAVE MONEY TO GET MORE.: NEVER TRUE

## 2025-01-21 SDOH — ECONOMIC STABILITY: FOOD INSECURITY: WITHIN THE PAST 12 MONTHS, YOU WORRIED THAT YOUR FOOD WOULD RUN OUT BEFORE YOU GOT MONEY TO BUY MORE.: NEVER TRUE

## 2025-01-21 NOTE — PROGRESS NOTES
[facility] Prenatal Office Note  Subjective:  Jen Mueller is here for a return obstetrical visit. Today she is 39w5d weeks EGA.  Pt does feel fetal movement regularly. Denies headaches, RUQ pain, or visual changes. Denies vaginal bleeding or leaking of fluid.      Problems/Complaints today:  Healthy, no complaints  Objective:  Mother's Prenatal Vitals  BP: 117/75  Weight - Scale: 96.2 kg (212 lb)  Pulse: 79  Patient Position: Sitting  Prenatal Fetal Information  Fundal Height (cm): 39 cm  Fetal HR: 133  Movement: Present  Cervical Exam  Dilation (cm): 5  Effacement: 80  Station: -1  Station (Labor Curve Graph): 6  Dil/Eff/Sta  Dilation (cm): 5  Effacement: 80  Station: -1  Pt is A&Ox3, in no acute distress. Normocephalic, atraumatic. PERRL. Resp even and non-labored. Skin pink, warm & dry. Gravid abdomen. ERZAO's well. Gait steady.   Assessment:    IUP at 39w5d wks      Diagnosis Orders   1. 39 weeks gestation of pregnancy        2. Encounter for supervision of other normal pregnancy, third trimester        3. Varicose veins of left lower extremity with pain          Plan:  Problems/Complaints Management Plan:  Continue current PNV dose.   Routine OB Management Plan:  Pt counseled on balanced nutrition, adequate fluid intake, taking PNV daily, and exercise along with labor precautions, GHTN precautions, and Kick count  Continue with routine prenatal care.   surveillance not indicated  RTC in 1 wks for prenatal visit    MEDICATIONS:  No orders of the defined types were placed in this encounter.    ORDERS:  No orders of the defined types were placed in this encounter.      I Polly Saldivar RN, am scribing for and in the presence of Milly Keller CNM  25  10:16 AM CST   I have seen and examined the patient independently.  I reviewed all laboratory and imaging studies that are relevant.  I have reviewed and made any appropriate changes to the HPI.    Electronically signed by KAIA Armstrong CNM on

## 2025-01-23 PROBLEM — O34.219 HX SUCCESSFUL VBAC (VAGINAL BIRTH AFTER CESAREAN), CURRENTLY PREGNANT: Status: ACTIVE | Noted: 2025-01-23

## 2025-01-27 ENCOUNTER — ROUTINE PRENATAL (OUTPATIENT)
Dept: OBGYN CLINIC | Age: 30
End: 2025-01-27

## 2025-01-27 ENCOUNTER — HOSPITAL ENCOUNTER (INPATIENT)
Age: 30
LOS: 2 days | Discharge: HOME | End: 2025-01-29
Attending: ADVANCED PRACTICE MIDWIFE | Admitting: ADVANCED PRACTICE MIDWIFE

## 2025-01-27 VITALS
BODY MASS INDEX: 34.06 KG/M2 | WEIGHT: 211 LBS | SYSTOLIC BLOOD PRESSURE: 114 MMHG | DIASTOLIC BLOOD PRESSURE: 75 MMHG | HEART RATE: 78 BPM

## 2025-01-27 DIAGNOSIS — O34.219 HX SUCCESSFUL VBAC (VAGINAL BIRTH AFTER CESAREAN), CURRENTLY PREGNANT: ICD-10-CM

## 2025-01-27 DIAGNOSIS — I83.812 VARICOSE VEINS OF LEFT LOWER EXTREMITY WITH PAIN: ICD-10-CM

## 2025-01-27 DIAGNOSIS — Z3A.40 40 WEEKS GESTATION OF PREGNANCY: ICD-10-CM

## 2025-01-27 DIAGNOSIS — Z3A.40 40 WEEKS GESTATION OF PREGNANCY: Primary | ICD-10-CM

## 2025-01-27 DIAGNOSIS — O48.0 POST-TERM PREGNANCY, 40-42 WEEKS OF GESTATION: Primary | ICD-10-CM

## 2025-01-27 DIAGNOSIS — O09.93 HIGH-RISK PREGNANCY IN THIRD TRIMESTER: ICD-10-CM

## 2025-01-27 DIAGNOSIS — Z98.891 HISTORY OF C-SECTION: ICD-10-CM

## 2025-01-27 LAB
ABO + RH BLD: NORMAL
AMPHET UR QL SCN: NEGATIVE
BARBITURATES UR QL SCN: NEGATIVE
BASOPHILS # BLD: 0 K/UL (ref 0–0.2)
BASOPHILS NFR BLD: 0.4 % (ref 0–1)
BENZODIAZ UR QL SCN: NEGATIVE
BLD GP AB SCN SERPL QL: NORMAL
BUPRENORPHINE URINE: NEGATIVE
CANNABINOIDS UR QL SCN: NEGATIVE
COCAINE UR QL SCN: NEGATIVE
DRUG SCREEN COMMENT UR-IMP: NORMAL
EOSINOPHIL # BLD: 0 K/UL (ref 0–0.6)
EOSINOPHIL NFR BLD: 0.2 % (ref 0–5)
ERYTHROCYTE [DISTWIDTH] IN BLOOD BY AUTOMATED COUNT: 16.6 % (ref 11.5–14.5)
ERYTHROCYTE [DISTWIDTH] IN BLOOD BY AUTOMATED COUNT: 16.8 % (ref 11.5–14.5)
FENTANYL SCREEN, URINE: NEGATIVE
HCT VFR BLD AUTO: 26.8 % (ref 37–47)
HCT VFR BLD AUTO: 31.2 % (ref 37–47)
HGB BLD-MCNC: 7.9 G/DL (ref 12–16)
HGB BLD-MCNC: 9.4 G/DL (ref 12–16)
IMM GRANULOCYTES # BLD: 0.2 K/UL
LYMPHOCYTES # BLD: 1.4 K/UL (ref 1.1–4.5)
LYMPHOCYTES NFR BLD: 14.9 % (ref 20–40)
MCH RBC QN AUTO: 23.2 PG (ref 27–31)
MCH RBC QN AUTO: 23.4 PG (ref 27–31)
MCHC RBC AUTO-ENTMCNC: 29.5 G/DL (ref 33–37)
MCHC RBC AUTO-ENTMCNC: 30.1 G/DL (ref 33–37)
MCV RBC AUTO: 77.8 FL (ref 81–99)
MCV RBC AUTO: 78.6 FL (ref 81–99)
METHADONE UR QL SCN: NEGATIVE
METHAMPHETAMINE, URINE: NEGATIVE
MONOCYTES # BLD: 0.5 K/UL (ref 0–0.9)
MONOCYTES NFR BLD: 5.2 % (ref 0–10)
NEUTROPHILS # BLD: 7.1 K/UL (ref 1.5–7.5)
NEUTS SEG NFR BLD: 77.7 % (ref 50–65)
OPIATES UR QL SCN: NEGATIVE
OXYCODONE UR QL SCN: NEGATIVE
PCP UR QL SCN: NEGATIVE
PLATELET # BLD AUTO: 217 K/UL (ref 130–400)
PLATELET # BLD AUTO: 274 K/UL (ref 130–400)
PMV BLD AUTO: 10.2 FL (ref 9.4–12.3)
PMV BLD AUTO: 11.3 FL (ref 9.4–12.3)
RBC # BLD AUTO: 3.41 M/UL (ref 4.2–5.4)
RBC # BLD AUTO: 4.01 M/UL (ref 4.2–5.4)
TRICYCLIC ANTIDEPRESSANTS, UR: NEGATIVE
WBC # BLD AUTO: 9.2 K/UL (ref 4.8–10.8)
WBC # BLD AUTO: 9.4 K/UL (ref 4.8–10.8)

## 2025-01-27 PROCEDURE — 85025 COMPLETE CBC W/AUTO DIFF WBC: CPT

## 2025-01-27 PROCEDURE — 6370000000 HC RX 637 (ALT 250 FOR IP): Performed by: ADVANCED PRACTICE MIDWIFE

## 2025-01-27 PROCEDURE — 86901 BLOOD TYPING SEROLOGIC RH(D): CPT

## 2025-01-27 PROCEDURE — 86900 BLOOD TYPING SEROLOGIC ABO: CPT

## 2025-01-27 PROCEDURE — 10907ZC DRAINAGE OF AMNIOTIC FLUID, THERAPEUTIC FROM PRODUCTS OF CONCEPTION, VIA NATURAL OR ARTIFICIAL OPENING: ICD-10-PCS | Performed by: ADVANCED PRACTICE MIDWIFE

## 2025-01-27 PROCEDURE — 85027 COMPLETE CBC AUTOMATED: CPT

## 2025-01-27 PROCEDURE — 80307 DRUG TEST PRSMV CHEM ANLYZR: CPT

## 2025-01-27 PROCEDURE — 6360000002 HC RX W HCPCS: Performed by: ADVANCED PRACTICE MIDWIFE

## 2025-01-27 PROCEDURE — 1220000000 HC SEMI PRIVATE OB R&B

## 2025-01-27 PROCEDURE — 36415 COLL VENOUS BLD VENIPUNCTURE: CPT

## 2025-01-27 PROCEDURE — 59409 OBSTETRICAL CARE: CPT | Performed by: ADVANCED PRACTICE MIDWIFE

## 2025-01-27 PROCEDURE — 7200000001 HC VAGINAL DELIVERY

## 2025-01-27 PROCEDURE — G0480 DRUG TEST DEF 1-7 CLASSES: HCPCS

## 2025-01-27 PROCEDURE — 86592 SYPHILIS TEST NON-TREP QUAL: CPT

## 2025-01-27 PROCEDURE — 86850 RBC ANTIBODY SCREEN: CPT

## 2025-01-27 PROCEDURE — 99213 OFFICE O/P EST LOW 20 MIN: CPT | Performed by: ADVANCED PRACTICE MIDWIFE

## 2025-01-27 RX ORDER — KETOROLAC TROMETHAMINE 30 MG/ML
30 INJECTION, SOLUTION INTRAMUSCULAR; INTRAVENOUS ONCE
Status: COMPLETED | OUTPATIENT
Start: 2025-01-27 | End: 2025-01-27

## 2025-01-27 RX ORDER — IBUPROFEN 400 MG/1
800 TABLET, FILM COATED ORAL EVERY 8 HOURS
Status: DISCONTINUED | OUTPATIENT
Start: 2025-01-27 | End: 2025-01-29 | Stop reason: HOSPADM

## 2025-01-27 RX ORDER — SODIUM CHLORIDE 9 MG/ML
INJECTION, SOLUTION INTRAVENOUS PRN
Status: DISCONTINUED | OUTPATIENT
Start: 2025-01-27 | End: 2025-01-29 | Stop reason: HOSPADM

## 2025-01-27 RX ORDER — TRANEXAMIC ACID 10 MG/ML
1000 INJECTION, SOLUTION INTRAVENOUS
Status: ACTIVE | OUTPATIENT
Start: 2025-01-27 | End: 2025-01-28

## 2025-01-27 RX ORDER — SODIUM CHLORIDE, SODIUM LACTATE, POTASSIUM CHLORIDE, AND CALCIUM CHLORIDE .6; .31; .03; .02 G/100ML; G/100ML; G/100ML; G/100ML
500 INJECTION, SOLUTION INTRAVENOUS PRN
Status: DISCONTINUED | OUTPATIENT
Start: 2025-01-27 | End: 2025-01-27

## 2025-01-27 RX ORDER — IBUPROFEN 400 MG/1
800 TABLET, FILM COATED ORAL EVERY 8 HOURS SCHEDULED
Status: DISCONTINUED | OUTPATIENT
Start: 2025-01-27 | End: 2025-01-27

## 2025-01-27 RX ORDER — OXYCODONE AND ACETAMINOPHEN 5; 325 MG/1; MG/1
1 TABLET ORAL EVERY 4 HOURS PRN
Status: DISCONTINUED | OUTPATIENT
Start: 2025-01-27 | End: 2025-01-29 | Stop reason: HOSPADM

## 2025-01-27 RX ORDER — MORPHINE SULFATE 2 MG/ML
4 INJECTION, SOLUTION INTRAMUSCULAR; INTRAVENOUS EVERY 4 HOURS PRN
Status: DISCONTINUED | OUTPATIENT
Start: 2025-01-27 | End: 2025-01-29 | Stop reason: HOSPADM

## 2025-01-27 RX ORDER — MISOPROSTOL 200 UG/1
200 TABLET ORAL ONCE
Status: COMPLETED | OUTPATIENT
Start: 2025-01-27 | End: 2025-01-27

## 2025-01-27 RX ORDER — METHYLERGONOVINE MALEATE 0.2 MG/ML
200 INJECTION INTRAVENOUS PRN
Status: DISCONTINUED | OUTPATIENT
Start: 2025-01-27 | End: 2025-01-29 | Stop reason: HOSPADM

## 2025-01-27 RX ORDER — SODIUM CHLORIDE 9 MG/ML
25 INJECTION, SOLUTION INTRAVENOUS PRN
Status: DISCONTINUED | OUTPATIENT
Start: 2025-01-27 | End: 2025-01-27

## 2025-01-27 RX ORDER — TERBUTALINE SULFATE 1 MG/ML
0.25 INJECTION, SOLUTION SUBCUTANEOUS
Status: DISCONTINUED | OUTPATIENT
Start: 2025-01-27 | End: 2025-01-27

## 2025-01-27 RX ORDER — CARBOPROST TROMETHAMINE 250 UG/ML
250 INJECTION, SOLUTION INTRAMUSCULAR PRN
Status: DISCONTINUED | OUTPATIENT
Start: 2025-01-27 | End: 2025-01-29 | Stop reason: HOSPADM

## 2025-01-27 RX ORDER — OXYTOCIN 10 [USP'U]/ML
10 INJECTION, SOLUTION INTRAMUSCULAR; INTRAVENOUS ONCE
Status: COMPLETED | OUTPATIENT
Start: 2025-01-27 | End: 2025-01-27

## 2025-01-27 RX ORDER — SODIUM CHLORIDE, SODIUM LACTATE, POTASSIUM CHLORIDE, CALCIUM CHLORIDE 600; 310; 30; 20 MG/100ML; MG/100ML; MG/100ML; MG/100ML
INJECTION, SOLUTION INTRAVENOUS CONTINUOUS
Status: DISCONTINUED | OUTPATIENT
Start: 2025-01-27 | End: 2025-01-28

## 2025-01-27 RX ORDER — SODIUM CHLORIDE 0.9 % (FLUSH) 0.9 %
5-40 SYRINGE (ML) INJECTION EVERY 12 HOURS SCHEDULED
Status: DISCONTINUED | OUTPATIENT
Start: 2025-01-27 | End: 2025-01-27

## 2025-01-27 RX ORDER — SODIUM CHLORIDE 0.9 % (FLUSH) 0.9 %
5-40 SYRINGE (ML) INJECTION PRN
Status: DISCONTINUED | OUTPATIENT
Start: 2025-01-27 | End: 2025-01-27

## 2025-01-27 RX ORDER — SODIUM CHLORIDE 0.9 % (FLUSH) 0.9 %
5-40 SYRINGE (ML) INJECTION PRN
Status: DISCONTINUED | OUTPATIENT
Start: 2025-01-27 | End: 2025-01-29 | Stop reason: HOSPADM

## 2025-01-27 RX ORDER — SODIUM CHLORIDE 0.9 % (FLUSH) 0.9 %
5-40 SYRINGE (ML) INJECTION EVERY 12 HOURS SCHEDULED
Status: DISCONTINUED | OUTPATIENT
Start: 2025-01-27 | End: 2025-01-28

## 2025-01-27 RX ORDER — ACETAMINOPHEN 500 MG
1000 TABLET ORAL EVERY 8 HOURS
Status: DISCONTINUED | OUTPATIENT
Start: 2025-01-28 | End: 2025-01-29 | Stop reason: HOSPADM

## 2025-01-27 RX ORDER — FERROUS SULFATE 325(65) MG
325 TABLET ORAL EVERY OTHER DAY
Status: DISCONTINUED | OUTPATIENT
Start: 2025-01-27 | End: 2025-01-29 | Stop reason: HOSPADM

## 2025-01-27 RX ORDER — DOCUSATE SODIUM 100 MG/1
100 CAPSULE, LIQUID FILLED ORAL 2 TIMES DAILY
Status: DISCONTINUED | OUTPATIENT
Start: 2025-01-27 | End: 2025-01-29 | Stop reason: HOSPADM

## 2025-01-27 RX ORDER — MISOPROSTOL 200 UG/1
400 TABLET ORAL PRN
Status: DISCONTINUED | OUTPATIENT
Start: 2025-01-27 | End: 2025-01-29 | Stop reason: HOSPADM

## 2025-01-27 RX ORDER — ONDANSETRON 2 MG/ML
4 INJECTION INTRAMUSCULAR; INTRAVENOUS EVERY 6 HOURS PRN
Status: DISCONTINUED | OUTPATIENT
Start: 2025-01-27 | End: 2025-01-29 | Stop reason: HOSPADM

## 2025-01-27 RX ORDER — ONDANSETRON 2 MG/ML
4 INJECTION INTRAMUSCULAR; INTRAVENOUS EVERY 6 HOURS PRN
Status: DISCONTINUED | OUTPATIENT
Start: 2025-01-27 | End: 2025-01-28 | Stop reason: SDUPTHER

## 2025-01-27 RX ORDER — ONDANSETRON 4 MG/1
4 TABLET, ORALLY DISINTEGRATING ORAL EVERY 6 HOURS PRN
Status: DISCONTINUED | OUTPATIENT
Start: 2025-01-27 | End: 2025-01-29 | Stop reason: HOSPADM

## 2025-01-27 RX ORDER — DOCUSATE SODIUM 100 MG/1
100 CAPSULE, LIQUID FILLED ORAL 2 TIMES DAILY
Status: DISCONTINUED | OUTPATIENT
Start: 2025-01-27 | End: 2025-01-27

## 2025-01-27 RX ORDER — ONDANSETRON 4 MG/1
4 TABLET, ORALLY DISINTEGRATING ORAL EVERY 6 HOURS PRN
Status: DISCONTINUED | OUTPATIENT
Start: 2025-01-27 | End: 2025-01-28 | Stop reason: SDUPTHER

## 2025-01-27 RX ADMIN — IBUPROFEN 800 MG: 400 TABLET, FILM COATED ORAL at 21:50

## 2025-01-27 RX ADMIN — Medication 166.7 ML: at 16:26

## 2025-01-27 RX ADMIN — OXYTOCIN 10 UNITS: 10 INJECTION, SOLUTION INTRAMUSCULAR; INTRAVENOUS at 16:40

## 2025-01-27 RX ADMIN — METHYLERGONOVINE MALEATE 200 MCG: 0.2 INJECTION, SOLUTION INTRAMUSCULAR; INTRAVENOUS at 16:56

## 2025-01-27 RX ADMIN — FERROUS SULFATE TAB 325 MG (65 MG ELEMENTAL FE) 325 MG: 325 (65 FE) TAB at 20:33

## 2025-01-27 RX ADMIN — DOCUSATE SODIUM 100 MG: 100 CAPSULE, LIQUID FILLED ORAL at 21:50

## 2025-01-27 RX ADMIN — Medication 1 MILLI-UNITS/MIN: at 13:53

## 2025-01-27 RX ADMIN — MISOPROSTOL 200 MCG: 200 TABLET ORAL at 16:45

## 2025-01-27 RX ADMIN — MORPHINE SULFATE 4 MG: 2 INJECTION, SOLUTION INTRAMUSCULAR; INTRAVENOUS at 16:33

## 2025-01-27 RX ADMIN — KETOROLAC TROMETHAMINE 30 MG: 30 INJECTION, SOLUTION INTRAMUSCULAR at 16:29

## 2025-01-27 RX ADMIN — KETOROLAC TROMETHAMINE 30 MG: 30 INJECTION, SOLUTION INTRAMUSCULAR at 16:28

## 2025-01-27 RX ADMIN — ACETAMINOPHEN 1000 MG: 500 TABLET, FILM COATED ORAL at 22:00

## 2025-01-27 SDOH — ECONOMIC STABILITY: FOOD INSECURITY: WITHIN THE PAST 12 MONTHS, YOU WORRIED THAT YOUR FOOD WOULD RUN OUT BEFORE YOU GOT MONEY TO BUY MORE.: NEVER TRUE

## 2025-01-27 SDOH — ECONOMIC STABILITY: FOOD INSECURITY: WITHIN THE PAST 12 MONTHS, THE FOOD YOU BOUGHT JUST DIDN'T LAST AND YOU DIDN'T HAVE MONEY TO GET MORE.: NEVER TRUE

## 2025-01-27 SDOH — ECONOMIC STABILITY: INCOME INSECURITY: IN THE LAST 12 MONTHS, WAS THERE A TIME WHEN YOU WERE NOT ABLE TO PAY THE MORTGAGE OR RENT ON TIME?: NO

## 2025-01-27 SDOH — ECONOMIC STABILITY: INCOME INSECURITY: HOW HARD IS IT FOR YOU TO PAY FOR THE VERY BASICS LIKE FOOD, HOUSING, MEDICAL CARE, AND HEATING?: NOT HARD AT ALL

## 2025-01-27 SDOH — ECONOMIC STABILITY: INCOME INSECURITY: IN THE PAST 12 MONTHS, HAS THE ELECTRIC, GAS, OIL, OR WATER COMPANY THREATENED TO SHUT OFF SERVICE IN YOUR HOME?: NO

## 2025-01-27 ASSESSMENT — PATIENT HEALTH QUESTIONNAIRE - PHQ9
SUM OF ALL RESPONSES TO PHQ9 QUESTIONS 1 & 2: 0
2. FEELING DOWN, DEPRESSED OR HOPELESS: NOT AT ALL
SUM OF ALL RESPONSES TO PHQ QUESTIONS 1-9: 0
SUM OF ALL RESPONSES TO PHQ QUESTIONS 1-9: 0
1. LITTLE INTEREST OR PLEASURE IN DOING THINGS: NOT AT ALL
SUM OF ALL RESPONSES TO PHQ QUESTIONS 1-9: 0
SUM OF ALL RESPONSES TO PHQ QUESTIONS 1-9: 0

## 2025-01-27 ASSESSMENT — PAIN DESCRIPTION - ORIENTATION: ORIENTATION: LOWER

## 2025-01-27 ASSESSMENT — PAIN SCALES - GENERAL
PAINLEVEL_OUTOF10: 0
PAINLEVEL_OUTOF10: 9
PAINLEVEL_OUTOF10: 3

## 2025-01-27 ASSESSMENT — PAIN DESCRIPTION - LOCATION: LOCATION: ABDOMEN;VAGINA

## 2025-01-27 ASSESSMENT — PAIN DESCRIPTION - DESCRIPTORS: DESCRIPTORS: SORE;SHARP;ACHING

## 2025-01-27 NOTE — H&P
[facility]    OB History and Physical    Provider: KAIA Armstrong CNM  Date: 2025            5:01 PM    Patient Name: Dora Mueller  Patient : 1995  MRN: [id number]   Room/Bed: [address] [place], [place] [address]    SUBJECTIVE:    CHIEF COMPLAINT:  advanced dilation, active labor  Chief Complaint   Patient presents with    Laboring       HISTORY OF PRESENT ILLNESS:      Dora Muellers a 29 y.o. female at 40w4d presents with a chief complaint as above and is being admitted for active phase labor. She was seen in office today c/o mild contractions. SVE /. Her pregnancy has been complicated by AMA, grand multip, Prior c/s with  x 3. She was sent to LDR for augmentation.     Contractions: Yes  Rupture of membranes: No  Vaginal bleeding: No    REVIEW OF SYSTEMS:  A comprehensive review of systems was negative except for what was noted in the HPI.     OBJECTIVE:     Estimated Due Date:   Estimated Date of Delivery: 25   Patient's last menstrual period was 2024 (approximate).      PREGNANCY RISK FACTORS:       Patient Active Problem List   Diagnosis     (vaginal birth after )    Varicose veins of left lower extremity with pain    Hx successful  (vaginal birth after ), currently pregnant    40 weeks gestation of pregnancy        Steroids:  no    PAST OB HISTORY:  OB History    Para Term  AB Living   6 4 4 0 1 4   SAB IAB Ectopic Molar Multiple Live Births   1 0 0 0 0 4      # Outcome Date GA Lbr Henry/2nd Weight Sex Type Anes PTL Lv   6 Current            5 Term 23 40w2d 05:10 / 00:06 3.98 kg (8 lb 12.4 oz) F  None N PELON      Name: DANIELLA,BABY GIRL DORA      Apgar1: 8  Apgar5: 9   4 Term 21 39w2d 07:27 / 00:32 3.92 kg (8 lb 10.3 oz) M Vag-Spont EPI N PELON      Name: Jaziel      Apgar1: 8  Apgar5: 9   3 SAB 20     SAB      2 Term 18 40w4d 07:36 / 00:23 3.884 kg (8 lb 9 oz) F  None N PELON      Name: Angeles      Apgar1: 9  Apgar5: 9

## 2025-01-27 NOTE — PROGRESS NOTES
[facility] Prenatal Office Note  Subjective:  Jen Mueller is here for a return obstetrical visit. Today she is 40w4d weeks EGA.  Pt does feel fetal movement regularly. Denies headaches, RUQ pain, or visual changes as well as contractions, vaginal bleeding or leaking of fluid.     Problems/complaints today:  Post dates  Objective:  Mother's Prenatal Vitals  BP: 114/75  Weight - Scale: 95.7 kg (211 lb)  Pulse: 78  Patient Position: Sitting  Prenatal Fetal Information  Fundal Height (cm): 41 cm  Fetal HR: 135  Movement: Present  Cervical Exam  Dilation (cm): 7  Station: -1  Station (Labor Curve Graph): 6  Dil/Eff/Sta  Dilation (cm): 7  Station: -1  Pt is A&Ox3, in no acute distress. Normocephalic, atraumatic. PERRL. Resp even and non-labored. Skin pink, warm & dry. Gravid abdomen. ERAZO's well. Gait steady.   Assessment:    IUP at 40w4d wks      Diagnosis Orders   1. Post-term pregnancy, 40-42 weeks of gestation        2. Varicose veins of left lower extremity with pain        3. High-risk pregnancy in third trimester        4. History of         5. Hx successful  (vaginal birth after ), currently pregnant          Plan:  Problems/Complaints Management Plan:  Post dates-To l&d for augmentation  Continue current PNV dose.   Routine OB Management Plan:  Pt counseled on balanced nutrition, adequate fluid intake, taking PNV daily, and exercise along with labor precautions, GHTN precautions, and Kick count  Continue with routine prenatal care.   surveillance indicated for post dates  RTC in 0 wks for prenatal visit    MEDICATIONS:  No orders of the defined types were placed in this encounter.    ORDERS:  No orders of the defined types were placed in this encounter.        I Polly Saldivar RN, am scribing for and in the presence of Milly Keller CNM  25  11:36 AM CST   I have seen and examined the patient independently.  I reviewed all laboratory and imaging studies that are relevant.  I have

## 2025-01-27 NOTE — L&D DELIVERY NOTE
Oneal Mueller Jen [[id number]]      Labor Events     Labor: No   Steroids: None  Cervical Ripening Date/Time:      Antibiotics Received during Labor: No  Rupture Identifier: Sac 1  Rupture Date/Time:      Rupture Type: AROM  Fluid Color: Clear, Bloody Show  Fluid Odor: None  Fluid Volume: Moderate  Augmentation: Oxytocin  Labor Complications: None       Anesthesia    Method: None       Labor Event Times      Labor onset date/time:  25 11:00:00 CST     Dilation complete date/time:        Start pushing date/time:  2025 16:02:00   Decision date/time (emergent ):            Delivery Details      Delivery Date: 25 Delivery Time: 16:07:00   Delivery Type: , Spontaneous               Presentation    Presentation: Vertex  Position: Right  _: Occiput  _: Anterior       Shoulder Dystocia    Shoulder Dystocia Present?: No       Assisted Delivery Details    Forceps Attempted?: No  Vacuum Extractor Attempted?: No                           Cord    Vessels: 3 Vessels  Complications: Nuchal Tight  Cord Around: Head  Delayed Cord Clamping?: Yes  Cord Clamped Date/Time: 2025 16:11:34  Cord Blood Disposition: Lab              Placenta    Date/Time: 2025 16:30:00  Removal: Manual Removal  Appearance: Intact  Disposition: Placenta Refrigerator       Lacerations    Episiotomy: None       Vaginal Counts      Intial Sponge Count: Correct     Final Sponges Count: Correct     Accurate Final Count?: Yes       Blood Loss  Mother: Ervin Jen SMITH #[id number]     Start of Mother's Information      Delivery Blood Loss   Intrapartum & Postpartum: 25 1100 - 25 170    Delivery Admission: 25 1100 - 25 170         Intrapartum & Postpartum Delivery Admission    None                  End of Mother's Information  Mother: Jen Mueller #[id number]                Delivery Providers    Delivering clinician: Milly Keller APRN - CNM     Provider Role     Obstetrician     Primary

## 2025-01-27 NOTE — DISCHARGE INSTRUCTIONS
Kimi provided Pt with written documentation for community resources based on Pt SDOH scores and any additional resources needed. These resources will be provided to the Pt at D/C. Electronically signed by Kimi Danielle on 2025 at 3:57 PM   POSTPARTUM EDUCATION / DISCHARGE PLANNING    Call Doctor:  1. If temp 100.4 or greater.  2. If foul smelling discharge.  3. If discharge changes from pink or yellow, back to red, and bleeding is heavier than a normal period.  4. If you pass large clots.   5. If abdominal incision starts to separate and/or starts to bleeding, is red and warm to touch or has drainage with a foul odor.  6. Report any pain, redness, or warmth of skin in calf of leg which could indicate a blood clot.    Crampin. Cramping is normal; uterus is returning to pre-pregnant state.  2. Moms of twins and mothers of more than one child and breast-feeding mothers will cramp more than first time moms.  3. For relief, place pillow under abdomen and lie on it to apply pressure. Walking may help.    Discharge:   1. Discharge will be dark for first few days (similar to menstrual flow). It will turn to pinkish brown after 2-3 days and creamy yellow for an additional week or two. Moderate flow-use of 4-8 pads daily.  2. Small clots are normal.    Episiotomy Care:  1. May use sitz bath 3-4 times daily.  2. Use analgesic foams or sprays or medicine as ordered.   3. Keep perineal area clean.  4. Continue to use cyndy bottle after urinating and gently pat from front to back.  5. Stitches will dissolve on their own. If you have stitches, shower only for 2-4 weeks or as directed by your doctor to allow suture line to heal properly. No baths, hot tubs or swimming pools until told it is alright to do so by your doctor.    Abdominal Incision Care:  1. Leave open to air after original dressing is removed.   2. Clean incision with soapy water unless otherwise directed.    Return of Periods:  1. You

## 2025-01-27 NOTE — CARE COORDINATION
Kimi provided Pt with written documentation for community resources based on Pt SDOH scores and any additional resources needed. These resources will be provided to the Pt at D/C. Electronically signed by Kiim Danielle on 1/27/2025 at 3:57 PM

## 2025-01-28 LAB
BASOPHILS # BLD: 0 K/UL (ref 0–0.2)
BASOPHILS NFR BLD: 0.4 % (ref 0–1)
EOSINOPHIL # BLD: 0.1 K/UL (ref 0–0.6)
EOSINOPHIL NFR BLD: 0.5 % (ref 0–5)
ERYTHROCYTE [DISTWIDTH] IN BLOOD BY AUTOMATED COUNT: 16.8 % (ref 11.5–14.5)
HCT VFR BLD AUTO: 20.1 % (ref 37–47)
HGB BLD-MCNC: 6.3 G/DL (ref 12–16)
IMM GRANULOCYTES # BLD: 0.2 K/UL
LYMPHOCYTES # BLD: 2.6 K/UL (ref 1.1–4.5)
LYMPHOCYTES NFR BLD: 24 % (ref 20–40)
MCH RBC QN AUTO: 24 PG (ref 27–31)
MCHC RBC AUTO-ENTMCNC: 31.3 G/DL (ref 33–37)
MCV RBC AUTO: 76.7 FL (ref 81–99)
MONOCYTES # BLD: 0.7 K/UL (ref 0–0.9)
MONOCYTES NFR BLD: 6.2 % (ref 0–10)
NEUTROPHILS # BLD: 7.4 K/UL (ref 1.5–7.5)
NEUTS SEG NFR BLD: 67.3 % (ref 50–65)
PLATELET # BLD AUTO: 220 K/UL (ref 130–400)
PMV BLD AUTO: 11.2 FL (ref 9.4–12.3)
RBC # BLD AUTO: 2.62 M/UL (ref 4.2–5.4)
WBC # BLD AUTO: 10.9 K/UL (ref 4.8–10.8)

## 2025-01-28 PROCEDURE — 85025 COMPLETE CBC W/AUTO DIFF WBC: CPT

## 2025-01-28 PROCEDURE — 6360000002 HC RX W HCPCS: Performed by: ADVANCED PRACTICE MIDWIFE

## 2025-01-28 PROCEDURE — 36415 COLL VENOUS BLD VENIPUNCTURE: CPT

## 2025-01-28 PROCEDURE — 99231 SBSQ HOSP IP/OBS SF/LOW 25: CPT | Performed by: ADVANCED PRACTICE MIDWIFE

## 2025-01-28 PROCEDURE — 2580000003 HC RX 258: Performed by: ADVANCED PRACTICE MIDWIFE

## 2025-01-28 PROCEDURE — 6370000000 HC RX 637 (ALT 250 FOR IP): Performed by: ADVANCED PRACTICE MIDWIFE

## 2025-01-28 PROCEDURE — 2500000003 HC RX 250 WO HCPCS: Performed by: ADVANCED PRACTICE MIDWIFE

## 2025-01-28 PROCEDURE — 1220000000 HC SEMI PRIVATE OB R&B

## 2025-01-28 RX ADMIN — IBUPROFEN 800 MG: 400 TABLET, FILM COATED ORAL at 04:12

## 2025-01-28 RX ADMIN — DOCUSATE SODIUM 100 MG: 100 CAPSULE, LIQUID FILLED ORAL at 20:26

## 2025-01-28 RX ADMIN — IBUPROFEN 800 MG: 400 TABLET, FILM COATED ORAL at 20:26

## 2025-01-28 RX ADMIN — ACETAMINOPHEN 1000 MG: 500 TABLET, FILM COATED ORAL at 08:18

## 2025-01-28 RX ADMIN — ACETAMINOPHEN 1000 MG: 500 TABLET, FILM COATED ORAL at 15:52

## 2025-01-28 RX ADMIN — IBUPROFEN 800 MG: 400 TABLET, FILM COATED ORAL at 11:40

## 2025-01-28 RX ADMIN — ACETAMINOPHEN 1000 MG: 500 TABLET, FILM COATED ORAL at 23:54

## 2025-01-28 RX ADMIN — DOCUSATE SODIUM 100 MG: 100 CAPSULE, LIQUID FILLED ORAL at 08:18

## 2025-01-28 RX ADMIN — FERRIC CARBOXYMALTOSE INJECTION 750 MG: 50 INJECTION, SOLUTION INTRAVENOUS at 09:01

## 2025-01-28 RX ADMIN — SODIUM CHLORIDE, PRESERVATIVE FREE 10 ML: 5 INJECTION INTRAVENOUS at 20:27

## 2025-01-28 ASSESSMENT — PAIN SCALES - GENERAL
PAINLEVEL_OUTOF10: 3
PAINLEVEL_OUTOF10: 0
PAINLEVEL_OUTOF10: 2
PAINLEVEL_OUTOF10: 3
PAINLEVEL_OUTOF10: 1

## 2025-01-28 ASSESSMENT — PAIN DESCRIPTION - LOCATION
LOCATION: ABDOMEN

## 2025-01-28 ASSESSMENT — PAIN DESCRIPTION - ORIENTATION
ORIENTATION: LOWER

## 2025-01-28 ASSESSMENT — PAIN - FUNCTIONAL ASSESSMENT
PAIN_FUNCTIONAL_ASSESSMENT: ACTIVITIES ARE NOT PREVENTED
PAIN_FUNCTIONAL_ASSESSMENT: ACTIVITIES ARE NOT PREVENTED

## 2025-01-28 ASSESSMENT — PAIN DESCRIPTION - DESCRIPTORS
DESCRIPTORS: CRAMPING

## 2025-01-28 NOTE — LACTATION NOTE
Infant Name: Kirill  Gestation: 40.4  Day of Life: 1  Birth weight: 10-9.7 lb (4810g)  Today's weight: 10-6.5 lb (4810g)  Weight loss: -2%  24 hour summary of feeds: breast x 6  Voids: 1  Stools: 4  Emesis:   Assistive device: none  Maternal History: , anemia  Breastfeeding History: yes, a year with all other babies  Maternal Medications: zofran, PNV  Nipple Assessment: Intact  Maternal Goal: one feeding at a time  Breast pump for home:  Yes       Instructed mother to continue to breastfeed every 2- 3 hours for 15-20 mins each side or on demand watching for hunger cues and using waking techniques when needed. 8-12 feedings in 24 hours being the goal. Hand expression and breast compressions encouraged to increase milk supply and transfer. Discussed the benefits of colostrum, skin to skin and the importance of good positioning and latch. Reminded mother about supply and demand. A list of educational videos on breastfeeding given. These videos can be found on You Tube.    This includes 1. \"Attaching Your Baby at the Breast\" 10 mins 2. \"Hand Expression\" 7 mins 3. \"The First Hour\" 11 mins 4. \"Visual Guide to Breastfeeding\" 33 mins 5. \"Really Good Drinking\" 2 mins 6. \"Compression Techniques\" 1 min.   Encouraged mother to watch, [employer] You Tube Video, \"Attaching Your Baby to the Breast\", to make sure mother is aware of what a deep effective latch looks like and how to achieve one.   Encouraged to call out for help with feedings when needed. Denies questions at this time.

## 2025-01-28 NOTE — LACTATION NOTE
Infant Name: Kirill  Gestation: 40.4  Day of Life: NB  Birth weight: 10-9.7 lb (4810g)  Today's weight:  Weight loss:  24 hour summary of feeds: breast x2  Voids:  Stools:  Emesis:   Assistive device: none  Maternal History: , anemia  Breastfeeding History: a year with all other babies  Maternal Medications: zofran, PNV  Nipple Assessment: Intact  Maternal Goal: one feeding at a time  Breast pump for home:  Yes       Instructed mother to breastfeed every 2- 3 hours for 15-20 mins each side or on demand watching for hunger cues and using waking techniques when needed. 8-12 feedings in 24 hours being the goal. Hand expression and breast compressions encouraged to increase milk supply and transfer. Discussed the benefits of colostrum, skin to skin and the importance of good positioning and latch. Informed mother that baby can be very sleepy the first 24 hours and typically the 2nd night babies will be more awake and want to feed a lot and that this is normal and important in establishing milk supply. Breastfeeding book given

## 2025-01-28 NOTE — PLAN OF CARE
Problem: Pain  Goal: Verbalizes/displays adequate comfort level or baseline comfort level  Outcome: Progressing     Problem: Cardiovascular - Adult  Goal: Maintains optimal cardiac output and hemodynamic stability  Description: INTERVENTIONS:.  -Monitor blood pressure and heart rate  -Monitor urine output and notify licensed practitioner for values outside of   -Administer fluid and /or volume expanders as ordered  -Administer vasoactive or antifibrinolytic medications as ordered  -Monitor labs and assess for signs and symptoms of volume excess or deficit  -Monitor response to interventions for patient's volume status, including labs, urine output  -Assess for signs and symptoms of bleeding or hemorrhage  -Monitor labs for bleeding or clotting disorders as ordered  Administer blood products/factors as ordered    Outcome: Progressing     Problem: Respiratory - Adult  Goal: Achieves optimal ventilation and oxygenation  Description: INTERVENTIONS:.  -Assess for changes in respiratory status   -Assess for changes in mentation and behavior  -Position to facilitate oxygenation and minimize respiratory effort  -Oxygen supplementation based on oxygen saturation or arterial blood gases  -Assess and instruct to report shortness of breath or any respiratory difficulty  -Respiratory therapy support as indicated      Outcome: Progressing     Problem: Coping  Goal: Pt/Family able to verbalize concerns and demonstrate effective coping strategies  Description: INTERVENTIONS:  1. Assist patient/family to identify coping skills, available support systems and cultural and spiritual values  2. Provide emotional support, including active listening and acknowledgement of concerns of patient and caregivers  3. Reduce environmental stimuli, as able  4. Instruct patient/family in relaxation techniques, as appropriate  5. Assess for spiritual pain/suffering and initiate Spiritual Care, Psychosocial Clinical Specialist consults as

## 2025-01-28 NOTE — PLAN OF CARE
Problem: Pain  Goal: Verbalizes/displays adequate comfort level or baseline comfort level  Outcome: Progressing  Flowsheets (Taken 1/28/2025 0818)  Verbalizes/displays adequate comfort level or baseline comfort level:   Encourage patient to monitor pain and request assistance   Assess pain using appropriate pain scale   Administer analgesics based on type and severity of pain and evaluate response     Problem: Cardiovascular - Adult  Goal: Maintains optimal cardiac output and hemodynamic stability  Description: INTERVENTIONS:.  -Monitor blood pressure and heart rate  -Monitor urine output and notify licensed practitioner for values outside of   -Administer fluid and /or volume expanders as ordered  -Administer vasoactive or antifibrinolytic medications as ordered  -Monitor labs and assess for signs and symptoms of volume excess or deficit  -Monitor response to interventions for patient's volume status, including labs, urine output  -Assess for signs and symptoms of bleeding or hemorrhage  -Monitor labs for bleeding or clotting disorders as ordered  Administer blood products/factors as ordered    Outcome: Progressing  Flowsheets (Taken 1/28/2025 0818)  Maintains optimal cardiac output and hemodynamic stability: Monitor blood pressure and heart rate     Problem: Respiratory - Adult  Goal: Achieves optimal ventilation and oxygenation  Description: INTERVENTIONS:.  -Assess for changes in respiratory status   -Assess for changes in mentation and behavior  -Position to facilitate oxygenation and minimize respiratory effort  -Oxygen supplementation based on oxygen saturation or arterial blood gases  -Assess and instruct to report shortness of breath or any respiratory difficulty  -Respiratory therapy support as indicated      Outcome: Progressing  Flowsheets (Taken 1/28/2025 0818)  Achieves optimal ventilation and oxygenation:   Assess for changes in respiratory status   Assess for changes in mentation and behavior

## 2025-01-28 NOTE — PROGRESS NOTES
[facility]  Progress Note    Subjective:     Postpartum Day 1: Vaginal Delivery    Patient is a  The patient feels well. The patient denies emotional concerns. Pain is well controlled with current medications. The baby iswell. Baby is feeding via breast. Urinary output is adequate. The patient is ambulating well. The patient is tolerating a normal diet. Flatus has been passed.    Objective:      Patient Vitals for the past 8 hrs:   BP Temp Temp src Pulse Resp SpO2   25 0754 132/82 97.5 °F (36.4 °C) Temporal 68 18 100 %     General:    alert, appears stated age, and cooperative   Bowel Sounds:  active   Lochia:  appropriate   Uterine Fundus:   firm   Episiotomy/lac:  N/a   DVT Evaluation:  No evidence of DVT seen on physical exam.     Lab Results   Component Value Date    WBC 10.9 (H) 2025    HGB 6.3 (L) 2025    HCT 20.1 (L) 2025    MCV 76.7 (L) 2025     2025     Assessment:     Status post vaginal delivery. doing well post vaginal  Anemia - offered blood transfusion, declined. Injectafer 750mg today and tomorrow.    Plan:     Continue current care.  Over 50% of the total visit time of 20 minutes was spent on counseling and/or coordination of care of post partum care, feeding instructions, importance of ambulation, and wound care instructions. All questions were answered and the patient voiced understanding.

## 2025-01-29 VITALS
SYSTOLIC BLOOD PRESSURE: 106 MMHG | DIASTOLIC BLOOD PRESSURE: 71 MMHG | RESPIRATION RATE: 16 BRPM | HEART RATE: 76 BPM | TEMPERATURE: 97.7 F | OXYGEN SATURATION: 97 %

## 2025-01-29 LAB
BASOPHILS # BLD: 0.1 K/UL (ref 0–0.2)
BASOPHILS NFR BLD: 0.4 % (ref 0–1)
EOSINOPHIL # BLD: 0.1 K/UL (ref 0–0.6)
EOSINOPHIL NFR BLD: 1 % (ref 0–5)
ERYTHROCYTE [DISTWIDTH] IN BLOOD BY AUTOMATED COUNT: 17.1 % (ref 11.5–14.5)
HCT VFR BLD AUTO: 20.4 % (ref 37–47)
HGB BLD-MCNC: 6.1 G/DL (ref 12–16)
IMM GRANULOCYTES # BLD: 0.6 K/UL
LYMPHOCYTES # BLD: 2.5 K/UL (ref 1.1–4.5)
LYMPHOCYTES NFR BLD: 21.5 % (ref 20–40)
MCH RBC QN AUTO: 24.2 PG (ref 27–31)
MCHC RBC AUTO-ENTMCNC: 29.9 G/DL (ref 33–37)
MCV RBC AUTO: 81 FL (ref 81–99)
MONOCYTES # BLD: 0.6 K/UL (ref 0–0.9)
MONOCYTES NFR BLD: 4.8 % (ref 0–10)
NEUTROPHILS # BLD: 7.8 K/UL (ref 1.5–7.5)
NEUTS SEG NFR BLD: 67.1 % (ref 50–65)
PLATELET # BLD AUTO: 276 K/UL (ref 130–400)
PMV BLD AUTO: 10.8 FL (ref 9.4–12.3)
RBC # BLD AUTO: 2.52 M/UL (ref 4.2–5.4)
WBC # BLD AUTO: 11.6 K/UL (ref 4.8–10.8)

## 2025-01-29 PROCEDURE — 2580000003 HC RX 258: Performed by: ADVANCED PRACTICE MIDWIFE

## 2025-01-29 PROCEDURE — 36415 COLL VENOUS BLD VENIPUNCTURE: CPT

## 2025-01-29 PROCEDURE — 6370000000 HC RX 637 (ALT 250 FOR IP): Performed by: ADVANCED PRACTICE MIDWIFE

## 2025-01-29 PROCEDURE — 6360000002 HC RX W HCPCS: Performed by: ADVANCED PRACTICE MIDWIFE

## 2025-01-29 PROCEDURE — 85025 COMPLETE CBC W/AUTO DIFF WBC: CPT

## 2025-01-29 PROCEDURE — 99239 HOSP IP/OBS DSCHRG MGMT >30: CPT | Performed by: ADVANCED PRACTICE MIDWIFE

## 2025-01-29 RX ORDER — IBUPROFEN 800 MG/1
800 TABLET, FILM COATED ORAL EVERY 8 HOURS
Qty: 30 TABLET | Refills: 0 | Status: SHIPPED | OUTPATIENT
Start: 2025-01-29

## 2025-01-29 RX ORDER — FERROUS SULFATE 325(65) MG
325 TABLET ORAL EVERY OTHER DAY
Qty: 30 TABLET | Refills: 3 | Status: SHIPPED | OUTPATIENT
Start: 2025-01-29

## 2025-01-29 RX ADMIN — FERROUS SULFATE TAB 325 MG (65 MG ELEMENTAL FE) 325 MG: 325 (65 FE) TAB at 08:45

## 2025-01-29 RX ADMIN — DOCUSATE SODIUM 100 MG: 100 CAPSULE, LIQUID FILLED ORAL at 08:09

## 2025-01-29 RX ADMIN — FERRIC CARBOXYMALTOSE INJECTION 750 MG: 50 INJECTION, SOLUTION INTRAVENOUS at 08:12

## 2025-01-29 RX ADMIN — IBUPROFEN 800 MG: 400 TABLET, FILM COATED ORAL at 11:54

## 2025-01-29 RX ADMIN — ACETAMINOPHEN 1000 MG: 500 TABLET, FILM COATED ORAL at 08:09

## 2025-01-29 ASSESSMENT — PAIN DESCRIPTION - ORIENTATION
ORIENTATION: LOWER
ORIENTATION: LOWER

## 2025-01-29 ASSESSMENT — PAIN DESCRIPTION - LOCATION
LOCATION: ABDOMEN
LOCATION: ABDOMEN

## 2025-01-29 ASSESSMENT — PAIN SCALES - GENERAL
PAINLEVEL_OUTOF10: 1
PAINLEVEL_OUTOF10: 2

## 2025-01-29 ASSESSMENT — PAIN DESCRIPTION - DESCRIPTORS
DESCRIPTORS: CRAMPING
DESCRIPTORS: CRAMPING

## 2025-01-29 NOTE — PLAN OF CARE
Problem: Pain  Goal: Verbalizes/displays adequate comfort level or baseline comfort level  Outcome: Adequate for Discharge  Flowsheets (Taken 1/29/2025 0809)  Verbalizes/displays adequate comfort level or baseline comfort level:   Encourage patient to monitor pain and request assistance   Assess pain using appropriate pain scale   Administer analgesics based on type and severity of pain and evaluate response     Problem: Cardiovascular - Adult  Goal: Maintains optimal cardiac output and hemodynamic stability  Description: INTERVENTIONS:.  -Monitor blood pressure and heart rate  -Monitor urine output and notify licensed practitioner for values outside of   -Administer fluid and /or volume expanders as ordered  -Administer vasoactive or antifibrinolytic medications as ordered  -Monitor labs and assess for signs and symptoms of volume excess or deficit  -Monitor response to interventions for patient's volume status, including labs, urine output  -Assess for signs and symptoms of bleeding or hemorrhage  -Monitor labs for bleeding or clotting disorders as ordered  Administer blood products/factors as ordered    Outcome: Adequate for Discharge  Flowsheets (Taken 1/29/2025 0809)  Maintains optimal cardiac output and hemodynamic stability:   Monitor blood pressure and heart rate   Assess for signs and symptoms of bleeding or hemorrhage   Administer blood products/factors as ordered     Problem: Respiratory - Adult  Goal: Achieves optimal ventilation and oxygenation  Description: INTERVENTIONS:.  -Assess for changes in respiratory status   -Assess for changes in mentation and behavior  -Position to facilitate oxygenation and minimize respiratory effort  -Oxygen supplementation based on oxygen saturation or arterial blood gases  -Assess and instruct to report shortness of breath or any respiratory difficulty  -Respiratory therapy support as indicated      Outcome: Adequate for Discharge  Flowsheets (Taken 1/29/2025

## 2025-01-29 NOTE — DISCHARGE SUMMARY
[facility]   Discharge Summary    Patient Name: Jen Mueller  Patient : 1995  Room/Bed: 0213/0213-01  Primary Care Physician: No primary care provider on file.  Admit Date: 2025  1:11 PM    Reasons for Admission on 2025  1:11 PM  40 weeks gestation of pregnancy [Z3A.40]  No comment available  Onset of Labor    Patient Active Problem List   Diagnosis     (vaginal birth after )    Varicose veins of left lower extremity with pain    Hx successful  (vaginal birth after ), currently pregnant    40 weeks gestation of pregnancy       Jen Mueller is a 29 y.o. year old  who presented at 40w4d gestation with Laboring  . Her pregnancy has been complicated by previous c/s with 3 successful VBACs and iron deficiency anemia.    Please see H&P for detailed information.     She was admitted for: Active labor    Her admission course was complicated by PP hemorrhage and acute blood loss anemia. She declined blood transfusion and was given IV iron.  She is ambulating well, tolerating regular diet, and voiding without difficult. She was stable for discharge on day 2.     Hemoglobin   Date Value Ref Range Status   2025 6.3 (L) 12.0 - 16.0 g/dL Final   2025 7.9 (L) 12.0 - 16.0 g/dL Final     Hematocrit   Date Value Ref Range Status   2025 20.1 (L) 37.0 - 47.0 % Final   2025 26.8 (L) 37.0 - 47.0 % Final         Prenatal Procedures  BPP  Growth    REVIEW OF SYSTEMS  A comprehensive review of systems was negative except for what was noted in the HPI.     PHYSICAL EXAM     /70   Pulse 75   Temp 97.5 °F (36.4 °C) (Oral)   Resp 14   LMP 2024 (Approximate)   SpO2 99%   Breastfeeding Unknown     General appearance:  awake, alert, cooperative, no apparent distress, and appears stated age  Skin:  Warm, dry, no rashes or erythema  Neurologic:  Awake, alert, oriented to name, place and time.  Cranial nerves II-XII are grossly intact.  Motor

## 2025-01-29 NOTE — FLOWSHEET NOTE
Discharge instructions reviewed with the patient and she voiced understanding.    Retention Suture Text: Retention sutures were placed to support the closure and prevent dehiscence.

## 2025-01-29 NOTE — LACTATION NOTE
Infant Name: Kirill  Gestation: 40.4  Day of Life: 2  Birth weight: 10-9.7 lb (4810g)  25:  10-6.5 lb (4810g)  Today's weight: 9-13.7 lb (4470g)  Weight loss: -7%  24 hour summary of feeds: breast x 9  Voids: 9  Stools: 6  Emesis:   Assistive device: none  Maternal History: , anemia  Breastfeeding History: yes, a year with all other babies  Maternal Medications: zofran, PNV  Nipple Assessment: Intact  Maternal Goal: one feeding at a time  Breast pump for home:  Yes       Instructed mother to continue to breastfeed every 2- 3 hours for 15-20 mins each side or on demand watching for hunger cues and using waking techniques when needed. 8-12 feedings in 24 hours being the goal. Hand expression and breast compressions encouraged to increase milk supply and transfer. Discussed the benefits of colostrum, skin to skin and the importance of good positioning and latch. Reminded mother about supply and demand. A list of educational videos on breastfeeding given. Mother and baby will be discharged today, weight check to follow. Instructions and handouts given over management of sore nipples, engorgement, plugged ducts, mastitis, hydration, nutrition, and medications that could effect milk supply. Mother knows when to call MD if needed. Lactation number and hours provided. Mother knows she can call and make appointment for pre and post feeding weights whenever needed or can call with questions or concerns her entire breastfeeding journey. All questions at this time answered. Support and Encouragement given.

## 2025-01-30 LAB — RPR SER QL: NORMAL

## 2025-02-10 SDOH — ECONOMIC STABILITY: INCOME INSECURITY: IN THE LAST 12 MONTHS, WAS THERE A TIME WHEN YOU WERE NOT ABLE TO PAY THE MORTGAGE OR RENT ON TIME?: NO

## 2025-02-10 SDOH — ECONOMIC STABILITY: FOOD INSECURITY: WITHIN THE PAST 12 MONTHS, YOU WORRIED THAT YOUR FOOD WOULD RUN OUT BEFORE YOU GOT MONEY TO BUY MORE.: NEVER TRUE

## 2025-02-10 SDOH — ECONOMIC STABILITY: FOOD INSECURITY: WITHIN THE PAST 12 MONTHS, THE FOOD YOU BOUGHT JUST DIDN'T LAST AND YOU DIDN'T HAVE MONEY TO GET MORE.: NEVER TRUE

## 2025-02-14 ENCOUNTER — POSTPARTUM VISIT (OUTPATIENT)
Dept: OBGYN CLINIC | Age: 30
End: 2025-02-14

## 2025-02-14 VITALS — DIASTOLIC BLOOD PRESSURE: 78 MMHG | BODY MASS INDEX: 30.34 KG/M2 | WEIGHT: 188 LBS | SYSTOLIC BLOOD PRESSURE: 118 MMHG

## 2025-02-14 DIAGNOSIS — D62 ACUTE BLOOD LOSS ANEMIA: ICD-10-CM

## 2025-02-14 DIAGNOSIS — Z13.32 ENCOUNTER FOR SCREENING FOR MATERNAL DEPRESSION: ICD-10-CM

## 2025-02-14 DIAGNOSIS — L71.0 PERIORAL DERMATITIS: ICD-10-CM

## 2025-02-14 PROBLEM — Z3A.40 40 WEEKS GESTATION OF PREGNANCY: Status: RESOLVED | Noted: 2025-01-27 | Resolved: 2025-02-14

## 2025-02-14 PROBLEM — O34.219 HX SUCCESSFUL VBAC (VAGINAL BIRTH AFTER CESAREAN), CURRENTLY PREGNANT: Status: RESOLVED | Noted: 2025-01-23 | Resolved: 2025-02-14

## 2025-02-14 PROBLEM — O34.219 VBAC (VAGINAL BIRTH AFTER CESAREAN): Status: RESOLVED | Noted: 2023-05-03 | Resolved: 2025-02-14

## 2025-02-14 LAB
ERYTHROCYTE [DISTWIDTH] IN BLOOD BY AUTOMATED COUNT: 22.6 % (ref 11.5–14.5)
FERRITIN SERPL-MCNC: 271.6 NG/ML (ref 13–150)
HCT VFR BLD AUTO: 32.7 % (ref 37–47)
HGB BLD-MCNC: 9.3 G/DL (ref 12–16)
MCH RBC QN AUTO: 25.5 PG (ref 27–31)
MCHC RBC AUTO-ENTMCNC: 28.4 G/DL (ref 33–37)
MCV RBC AUTO: 89.6 FL (ref 81–99)
PLATELET # BLD AUTO: 253 K/UL (ref 130–400)
PMV BLD AUTO: 9.3 FL (ref 9.4–12.3)
RBC # BLD AUTO: 3.65 M/UL (ref 4.2–5.4)
WBC # BLD AUTO: 4 K/UL (ref 4.8–10.8)

## 2025-02-14 PROCEDURE — 99213 OFFICE O/P EST LOW 20 MIN: CPT | Performed by: ADVANCED PRACTICE MIDWIFE

## 2025-02-14 RX ORDER — CLINDAMYCIN PHOSPHATE 10 MG/G
GEL TOPICAL
Qty: 75 ML | Refills: 1 | Status: SHIPPED | OUTPATIENT
Start: 2025-02-14 | End: 2025-02-21

## 2025-02-14 NOTE — PROGRESS NOTES
[facility] OB/GYN  CNM Office Note    Jen Mueller is a 29 y.o. female who presents today for her medical conditions/ complaints as noted below.  Chief Complaint   Patient presents with    Postpartum Care       HPI  HPI   Jen presents for 2 week PP visit. She is doing well. Reports 1 syncopal episode at home. Hx includes PPH with anemia.   Visit Reason:  Post-Partum Visit       Baby's Name: Kirill        Delivery Date:  25       Type of Delivery:         Feeding: breast        LMP:  24       Contraceptive Choices:        Last PAP:  2022       Depression: 0  Problems/Complaints today:  Patient Active Problem List   Diagnosis    Varicose veins of left lower extremity with pain       Patient's last menstrual period was 2024 (approximate).      Past Medical History:   Diagnosis Date    Anemia     Miscarriage      Past Surgical History:   Procedure Laterality Date     SECTION N/A 2017     SECTION performed by Subhash Chavira MD at [facility] L&D OR    DILATION AND CURETTAGE OF UTERUS N/A 2020    DILATATION AND CURETTAGE SUCTION performed by Michaelle Lopez MD at [facility] OR    HERNIA REPAIR      as an infant    KS REPAIR ENTEROCELE VAGINAL APPROACH SPX N/A 2018    VAGINAL CANAL REPAIR performed by Michaelle Lopez MD at [facility] OR     History reviewed. No pertinent family history.  Social History     Tobacco Use    Smoking status: Never    Smokeless tobacco: Never   Substance Use Topics    Alcohol use: No     Alcohol/week: 0.0 standard drinks of alcohol       Current Outpatient Medications   Medication Sig Dispense Refill    ferrous sulfate (IRON 325) 325 (65 Fe) MG tablet Take 1 tablet by mouth every other day 30 tablet 3    ibuprofen (ADVIL;MOTRIN) 800 MG tablet Take 1 tablet by mouth every 8 (eight) hours 30 tablet 0    Prenatal MV-Min-Fe Fum-FA-DHA (PRENATAL 1 PO) Take by mouth       No current facility-administered medications for this visit.

## 2025-02-14 NOTE — PROGRESS NOTES
The patient returns for her post-partum visit.  All information below was reviewed with her.    Visit Reason:  Post-Partum Visit       Baby's Name: Kirill        Delivery Date:  25       Type of Delivery:         Feeding: breast        LMP:  24       Contraceptive Choices:        Last PAP:  2022       Depression: 0

## 2025-04-30 DIAGNOSIS — Z30.011 ENCOUNTER FOR INITIAL PRESCRIPTION OF CONTRACEPTIVE PILLS: Primary | ICD-10-CM

## 2025-04-30 RX ORDER — ACETAMINOPHEN AND CODEINE PHOSPHATE 120; 12 MG/5ML; MG/5ML
1 SOLUTION ORAL DAILY
Qty: 90 TABLET | Refills: 3 | Status: SHIPPED | OUTPATIENT
Start: 2025-04-30

## 2025-05-27 DIAGNOSIS — D62 ACUTE BLOOD LOSS ANEMIA: Primary | ICD-10-CM

## 2025-05-30 DIAGNOSIS — D62 ACUTE BLOOD LOSS ANEMIA: ICD-10-CM

## 2025-05-30 LAB
BASOPHILS # BLD: 0 K/UL (ref 0–0.2)
BASOPHILS NFR BLD: 0.3 % (ref 0–1)
EOSINOPHIL # BLD: 0 K/UL (ref 0–0.6)
EOSINOPHIL NFR BLD: 1.2 % (ref 0–5)
ERYTHROCYTE [DISTWIDTH] IN BLOOD BY AUTOMATED COUNT: 14.3 % (ref 11.5–14.5)
HCT VFR BLD AUTO: 37 % (ref 37–47)
HGB BLD-MCNC: 11.6 G/DL (ref 12–16)
IMM GRANULOCYTES # BLD: 0 K/UL
LYMPHOCYTES # BLD: 1.7 K/UL (ref 1.1–4.5)
LYMPHOCYTES NFR BLD: 50.4 % (ref 20–40)
MCH RBC QN AUTO: 26.6 PG (ref 27–31)
MCHC RBC AUTO-ENTMCNC: 31.4 G/DL (ref 33–37)
MCV RBC AUTO: 84.9 FL (ref 81–99)
MONOCYTES # BLD: 0.3 K/UL (ref 0–0.9)
MONOCYTES NFR BLD: 8.5 % (ref 0–10)
NEUTROPHILS # BLD: 1.4 K/UL (ref 1.5–7.5)
NEUTS SEG NFR BLD: 39.3 % (ref 50–65)
PLATELET # BLD AUTO: 195 K/UL (ref 130–400)
PMV BLD AUTO: 10.2 FL (ref 9.4–12.3)
RBC # BLD AUTO: 4.36 M/UL (ref 4.2–5.4)
WBC # BLD AUTO: 3.4 K/UL (ref 4.8–10.8)

## 2025-05-31 ENCOUNTER — RESULTS FOLLOW-UP (OUTPATIENT)
Dept: OBGYN CLINIC | Age: 30
End: 2025-05-31

## (undated) DEVICE — Device

## (undated) DEVICE — CANNULA SURG KARMANN SHT 10 MM ASPIR VAC CURET CRV W/O NDL

## (undated) DEVICE — MASK ROUND 60MM FPH (10) S/USE: Brand: FISHER & PAYKEL HEALTHCARE

## (undated) DEVICE — SUTURE VCRL SZ 2-0 L36IN ABSRB UD L36MM CT-1 1/2 CIR J945H

## (undated) DEVICE — PAD,ABDOMINAL,8"X10",ST,LF: Brand: MEDLINE

## (undated) DEVICE — TOWEL,OR,DSP,ST,BLUE,DLX,4/PK,20PK/CS: Brand: MEDLINE

## (undated) DEVICE — YANKAUER,TAPERED BULBOUS TIP,W/O VENT: Brand: MEDLINE

## (undated) DEVICE — SOLUTION IV 250ML 0.9% SOD CHL PH 5 INJ USP VIAFLX PLAS

## (undated) DEVICE — BINDER ABD H12IN COT FOR 45-62IN WAIST UNIV PREM 4 PNL DSGN

## (undated) DEVICE — MATTRESS TRANSFER X LG 39 IN LAT MAXI AIR

## (undated) DEVICE — SUTURE CHROMIC GUT SZ 0 L36IN ABSRB BRN CTX L48MM 1/2 CIR 904H

## (undated) DEVICE — SOLUTION IV IRRIG POUR BRL 0.9% SODIUM CHL 2F7124

## (undated) DEVICE — HYPODERMIC SAFETY NEEDLE: Brand: MAGELLAN

## (undated) DEVICE — SPONGE GZ L3FTXW2IN COT VAG XR DTECT 4 PLY MESH PK RL

## (undated) DEVICE — SYSTEM COLL W/ TISS TRAP INCLUDE COLL CANSTR LID SET OF

## (undated) DEVICE — GOWN,PREVENTION PLUS,XLN/XL,ST,24/CS: Brand: MEDLINE

## (undated) DEVICE — CANNULA SURG KARMANN SHT 8 MM ASPIR VAC CURET CRV W/O NDL

## (undated) DEVICE — E-Z CLEAN, NON-STICK, PTFE COATED, ELECTROSURGICAL BLADE ELECTRODE, MODIFIED EXTENDED INSULATION, 2.5 INCH (6.35 CM): Brand: MEGADYNE

## (undated) DEVICE — COVER LT HNDL PLAS RIG 2 PER PK

## (undated) DEVICE — U.V.A.C. SWIVEL HANDLE W/TUBING, 6': Brand: CONVERTORS

## (undated) DEVICE — SYRINGE EAR 2OZ ULC ST BLB FLAT BTM PVC BROAD TIP REUSE

## (undated) DEVICE — SUTURE ABSORBABLE BRAIDED 0 CT-1 8X27 IN UD VICRYL JJ41G

## (undated) DEVICE — GAUZE,SPONGE,4"X4",8PLY,STRL,LF,10/TRAY: Brand: MEDLINE

## (undated) DEVICE — SURGICAL PROCEDURE PACK GYNECOLOGIC MIN LOURDES HOSP

## (undated) DEVICE — TRAY SPINAL QUINCKE 22GA X 3.5IN

## (undated) DEVICE — SUTURE SZ 0 27IN 5/8 CIR UR-6  TAPER PT VIOLET ABSRB VICRYL J603H

## (undated) DEVICE — GLOVE SURG SZ 75 CRM LTX FREE POLYISOPRENE POLYMER BEAD ANTI

## (undated) DEVICE — Z INACTIVE USE 2660664 SOLUTION IRRIG 3000ML 0.9% SOD CHL USP UROMATIC PLAS CONT

## (undated) DEVICE — SMARTGOWN BREATHABLE SPECIALTY GOWN: Brand: CONVERTORS

## (undated) DEVICE — SUTURE VCRL SZ 2-0 L18IN ABSRB UD CT-1 L36MM 1/2 CIR J839D

## (undated) DEVICE — SUTURE VCRL SZ 0 L27IN ABSRB VLT L48MM CTX 1/2 CIR TAPR PNT J364H

## (undated) DEVICE — SUTURE VCRL SZ 3-0 L18IN ABSRB UD L26MM SH 1/2 CIR J864D

## (undated) DEVICE — Z DISCONTINUED USE 2275676 GLOVE SURG SZ 65 L12IN FNGR THK87MIL DK GRN LTX FREE ISOLEX

## (undated) DEVICE — SOLUTION IV 1000ML 0.9% SOD CHL PH 5 INJ USP VIAFLX PLAS

## (undated) DEVICE — STAPLER SKIN SQ 30 ABSRB STPL DISP INSORB

## (undated) DEVICE — CONVERTED USE 338908 SPONGES LAP 18X18 ST

## (undated) DEVICE — CATHETERIZATION TRAY PEDIATRIC 16 FR 5 CC INDWL DOVER

## (undated) DEVICE — SYRINGE IRRIG 60ML SFT PLIABLE BLB EZ TO GRP 1 HND USE W/